# Patient Record
Sex: MALE | Race: WHITE | NOT HISPANIC OR LATINO | Employment: OTHER | URBAN - METROPOLITAN AREA
[De-identification: names, ages, dates, MRNs, and addresses within clinical notes are randomized per-mention and may not be internally consistent; named-entity substitution may affect disease eponyms.]

---

## 2017-01-04 ENCOUNTER — OFFICE VISIT (OUTPATIENT)
Dept: HEMATOLOGY/ONCOLOGY | Facility: CLINIC | Age: 82
End: 2017-01-04
Payer: MEDICARE

## 2017-01-04 ENCOUNTER — TELEPHONE (OUTPATIENT)
Dept: PHARMACY | Facility: CLINIC | Age: 82
End: 2017-01-04

## 2017-01-04 ENCOUNTER — INFUSION (OUTPATIENT)
Dept: INFUSION THERAPY | Facility: HOSPITAL | Age: 82
End: 2017-01-04
Attending: INTERNAL MEDICINE
Payer: MEDICARE

## 2017-01-04 VITALS
HEIGHT: 71 IN | SYSTOLIC BLOOD PRESSURE: 118 MMHG | BODY MASS INDEX: 23.42 KG/M2 | RESPIRATION RATE: 19 BRPM | HEART RATE: 61 BPM | WEIGHT: 167.31 LBS | DIASTOLIC BLOOD PRESSURE: 55 MMHG

## 2017-01-04 DIAGNOSIS — D63.1 ANEMIA, CHRONIC RENAL FAILURE, STAGE 3 (MODERATE): ICD-10-CM

## 2017-01-04 DIAGNOSIS — R91.8 PULMONARY NODULES: ICD-10-CM

## 2017-01-04 DIAGNOSIS — C79.51 BONE METASTASES: ICD-10-CM

## 2017-01-04 DIAGNOSIS — N18.30 CKD (CHRONIC KIDNEY DISEASE) STAGE 3, GFR 30-59 ML/MIN: ICD-10-CM

## 2017-01-04 DIAGNOSIS — J43.1 PANLOBULAR EMPHYSEMA: ICD-10-CM

## 2017-01-04 DIAGNOSIS — M62.81 MUSCLE WEAKNESS (GENERALIZED): ICD-10-CM

## 2017-01-04 DIAGNOSIS — C61 PROSTATE CANCER: Primary | ICD-10-CM

## 2017-01-04 DIAGNOSIS — C61 PROSTATE CANCER: ICD-10-CM

## 2017-01-04 DIAGNOSIS — I25.10 CORONARY ARTERY DISEASE INVOLVING NATIVE CORONARY ARTERY OF NATIVE HEART WITHOUT ANGINA PECTORIS: ICD-10-CM

## 2017-01-04 DIAGNOSIS — E86.0 DEHYDRATION: Primary | ICD-10-CM

## 2017-01-04 DIAGNOSIS — N18.30 ANEMIA, CHRONIC RENAL FAILURE, STAGE 3 (MODERATE): ICD-10-CM

## 2017-01-04 DIAGNOSIS — I73.9 PVD (PERIPHERAL VASCULAR DISEASE): ICD-10-CM

## 2017-01-04 PROCEDURE — 99215 OFFICE O/P EST HI 40 MIN: CPT | Mod: S$GLB,,, | Performed by: INTERNAL MEDICINE

## 2017-01-04 PROCEDURE — 1160F RVW MEDS BY RX/DR IN RCRD: CPT | Mod: S$GLB,,, | Performed by: INTERNAL MEDICINE

## 2017-01-04 PROCEDURE — 96372 THER/PROPH/DIAG INJ SC/IM: CPT | Mod: PN

## 2017-01-04 PROCEDURE — 99999 PR PBB SHADOW E&M-EST. PATIENT-LVL III: CPT | Mod: PBBFAC,,, | Performed by: INTERNAL MEDICINE

## 2017-01-04 PROCEDURE — 1159F MED LIST DOCD IN RCRD: CPT | Mod: S$GLB,,, | Performed by: INTERNAL MEDICINE

## 2017-01-04 PROCEDURE — 1157F ADVNC CARE PLAN IN RCRD: CPT | Mod: S$GLB,,, | Performed by: INTERNAL MEDICINE

## 2017-01-04 PROCEDURE — 1126F AMNT PAIN NOTED NONE PRSNT: CPT | Mod: S$GLB,,, | Performed by: INTERNAL MEDICINE

## 2017-01-04 PROCEDURE — 3074F SYST BP LT 130 MM HG: CPT | Mod: S$GLB,,, | Performed by: INTERNAL MEDICINE

## 2017-01-04 PROCEDURE — 3078F DIAST BP <80 MM HG: CPT | Mod: S$GLB,,, | Performed by: INTERNAL MEDICINE

## 2017-01-04 PROCEDURE — 63600175 PHARM REV CODE 636 W HCPCS: Mod: PN | Performed by: INTERNAL MEDICINE

## 2017-01-04 RX ADMIN — ERYTHROPOIETIN 40000 UNITS: 40000 INJECTION, SOLUTION INTRAVENOUS; SUBCUTANEOUS at 11:01

## 2017-01-04 RX ADMIN — DENOSUMAB 120 MG: 120 INJECTION SUBCUTANEOUS at 11:01

## 2017-01-04 NOTE — PROGRESS NOTES
HISTORY OF PRESENT ILLNESS:  81-year-old white gentleman well known to me for   prostate carcinoma involving bone.  He is also followed for indeterminate   pulmonary nodules and is due for repeat imaging in August of this year.  The   patient has complaints of increasing muscle weakness, discoordination, and loss   of stamina.  No other pertinent findings on a 14-point review of systems.    PHYSICAL EXAMINATION:  GENERAL:  Well-developed, elderly, thin-appearing, white gentleman, in no acute   distress, who has a weight of 167 pounds (decreased by 0.5 pound).  VITAL SIGNS:  Documented in EMR and reviewed.    HEENT:  Normocephalic, atraumatic.  Oral mucosa pink and moist.  Lips without   lesions.  Tongue midline.  Oropharynx clear.  Nonicteric sclerae.   NECK:  Supple, no adenopathy.  No carotid bruits, thyromegaly or thyroid nodule.                                                                        HEART:  Regular rate and rhythm without murmur, gallop or rub.                LUNGS:  Clear to auscultation bilaterally.  Normal respiratory effort.       ABDOMEN:  Soft, nontender, nondistended with positive normoactive bowel sounds,   no hepatosplenomegaly.  AXILLAE AND GROIN:  No palpable pathologic lymphadenopathy is appreciated.        SKIN:  Intact/turgor normal.  NEUROLOGIC:  Cranial nerves II-XII grossly intact.  Motor:  Good muscle bulk and   tone.  Strength/sensory 5/5 throughout.  Gait stable.  EXTREMITIES:  1+ ankle edema bilaterally.  Distal pulses intact.    LABORATORY:  White count 4, H and H has fallen from 10.9 and 32.7 to 9.5 and   28.9, platelets are 104.  Sodium 140, potassium 4.5, chloride 106, CO2 26, BUN   27, creatinine 1.4, glucose 139, calcium 9.7, mag 2.0, alkaline phosphatase 46,   total protein 5.5, albumin 3.3, total bili 0.5, AST 20, ALT 15.  .  PSA   stable at 0.07.    IMPRESSION:  1.  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  2.  Indeterminate pulmonary  nodules.  Repeat imaging due in August.  3.  Loss of muscle strength and generalized discoordination likely the result of   androgen deprivation and other therapy for the patient's prostate cancer.  4.  Anemia in the setting of stage III chronic kidney disease with poor response   to low-dose Procrit.  5.  Coronary artery disease.  6.  Chronic obstructive pulmonary disease.  7.  Peripheral vascular disease.    PLAN:  1.  Proceed with 18th cycle of therapy to consist of abiraterone 1000 mg p.o.   daily, days 1 through 28, prednisone 10 mg p.o. daily, days 1 through 28.  2.  Continue calcium supplementation with vitamin D.  3.  Repeat Xgeva 120 mg subQ.  4.  Escalate Procrit dose to 40,000 units subcutaneously per week x4 weeks.  4.  Consult home physical therapy in order to help the patient with muscle   strengthening, conditioning, stamina, and to decrease fall risk.  5.  Return to clinic in four weeks with interval CBC, CMP, LDH, magnesium and   PSA prior to appointment.      FADUMO/HN  dd: 01/04/2017 10:45:32 (CST)  td: 01/04/2017 20:00:05 (CST)  Doc ID   #[id number]  Job ID #[id number]    CC:

## 2017-01-06 ENCOUNTER — TELEPHONE (OUTPATIENT)
Dept: PHARMACY | Facility: CLINIC | Age: 82
End: 2017-01-06

## 2017-01-09 DIAGNOSIS — Z91.81 RISK FOR FALLS: Primary | ICD-10-CM

## 2017-01-13 ENCOUNTER — INFUSION (OUTPATIENT)
Dept: INFUSION THERAPY | Facility: HOSPITAL | Age: 82
End: 2017-01-13
Attending: INTERNAL MEDICINE
Payer: MEDICARE

## 2017-01-13 VITALS — SYSTOLIC BLOOD PRESSURE: 108 MMHG | TEMPERATURE: 98 F | DIASTOLIC BLOOD PRESSURE: 68 MMHG | HEART RATE: 60 BPM

## 2017-01-13 DIAGNOSIS — E86.0 DEHYDRATION: Primary | ICD-10-CM

## 2017-01-13 DIAGNOSIS — C61 PROSTATE CANCER: ICD-10-CM

## 2017-01-13 DIAGNOSIS — C79.51 BONE METASTASES: ICD-10-CM

## 2017-01-13 PROCEDURE — 63600175 PHARM REV CODE 636 W HCPCS: Mod: PN | Performed by: INTERNAL MEDICINE

## 2017-01-13 PROCEDURE — 96372 THER/PROPH/DIAG INJ SC/IM: CPT | Mod: PN

## 2017-01-13 RX ADMIN — ERYTHROPOIETIN 40000 UNITS: 40000 INJECTION, SOLUTION INTRAVENOUS; SUBCUTANEOUS at 08:01

## 2017-01-13 NOTE — MR AVS SNAPSHOT
Patient Information     Patient Name Sex Cameron Garcia Male 1935      Visit Information        Provider Department Dept Phone Center    2017 8:30 AM CHAIR 07, [facility] [phone] [facility]      Patient Instructions     None      Your Current Medications Are     abiraterone 250 mg Tab    aspirin (ECOTRIN) 81 MG EC tablet    atorvastatin (LIPITOR) 40 MG tablet    bismuth subsalicylate (PEPTO BISMOL) 262 mg/15 mL suspension    calcium carbonate-vit D3-min (CALCIUM-VITAMIN D) 600 mg calcium- 400 unit Tab    fenofibrate (TRIGLIDE) 160 MG tablet    glimepiride (AMARYL) 2 MG tablet    isosorbide mononitrate (IMDUR) 60 MG 24 hr tablet    lisinopril (PRINIVIL,ZESTRIL) 2.5 MG tablet    metoprolol (TOPROL XL) 50 MG 24 hr tablet    Multi-Vitamin tablet    nitroGLYCERIN (NITROSTAT) 0.4 MG SL tablet    ONE TOUCH ULTRA TEST Strp    oxybutynin (DITROPAN) 5 MG Tab    phenylephrine-shark liver oil-mineral oil-petrolatum (PREPARATION H) rectal ointment    PLAVIX 75 mg tablet    predniSONE (DELTASONE) 10 MG tablet    ropinirole (REQUIP) 4 MG tablet    tamsulosin (FLOMAX) 0.4 mg Cp24    terazosin (HYTRIN) 1 MG capsule      Facility-Administered Medications     epoetin andrew injection 40,000 Units      Appointments for Next Year     2017  8:30 AM INFUSION 030 MIN (30 min.) [facility] CHAIR 07, [facility] CHEMO    Arrive at check-in approximately 15 minutes before your scheduled appointment time. Bring all outside medical records and imaging, along with a list of your current medications and insurance card.    1st Floor    2017  8:30 AM INFUSION 030 MIN (30 min.) [facility] CHAIR 09, [facility] CHEMO    Arrive at check-in approximately 15 minutes before your scheduled appointment time. Bring all outside medical records and imaging, along with a list of your current medications and insurance card.    1st Floor    2017  8:30 AM  INFUSION 030 MIN (30 min.) [facility] CHAIR 05, [facility]    Arrive at check-in approximately 15 minutes before your scheduled appointment time. Bring all outside medical records and imaging, along with a list of your current medications and insurance card.    1st Floor    1/31/2017  9:00 AM NON FASTING LAB (15 min.) [facility] LAB, [facility] DRAW STATION    Arrive at check-in approximately 15 minutes before your scheduled appointment time. Bring all outside medical records and imaging, along with a list of your current medications and insurance card.    2/1/2017 10:40 AM ESTABLISHED PATIENT (20 min.) [facility] Chris Fernandez MD    Arrive at check-in approximately 15 minutes before your scheduled appointment time. Bring all outside medical records and imaging, along with a list of your current medications and insurance card.    2/1/2017 12:00 PM INFUSION 030 MIN (30 min.) [facility] CHAIR 06, [facility] CHE[facility]    Arrive at check-in approximately 15 minutes before your scheduled appointment time. Bring all outside medical records and imaging, along with a list of your current medications and insurance card.    1st Floor      Allergies     No Known Drug Allergies       Current Discharge Medication List     Cannot display discharge medications since this is not an admission.

## 2017-01-20 ENCOUNTER — INFUSION (OUTPATIENT)
Dept: INFUSION THERAPY | Facility: HOSPITAL | Age: 82
End: 2017-01-20
Attending: INTERNAL MEDICINE
Payer: MEDICARE

## 2017-01-20 VITALS
HEART RATE: 60 BPM | RESPIRATION RATE: 16 BRPM | TEMPERATURE: 98 F | SYSTOLIC BLOOD PRESSURE: 90 MMHG | DIASTOLIC BLOOD PRESSURE: 58 MMHG

## 2017-01-20 DIAGNOSIS — C61 PROSTATE CANCER: ICD-10-CM

## 2017-01-20 DIAGNOSIS — C79.51 BONE METASTASES: ICD-10-CM

## 2017-01-20 DIAGNOSIS — E86.0 DEHYDRATION: Primary | ICD-10-CM

## 2017-01-20 PROCEDURE — 63600175 PHARM REV CODE 636 W HCPCS: Mod: PN | Performed by: INTERNAL MEDICINE

## 2017-01-20 PROCEDURE — 96372 THER/PROPH/DIAG INJ SC/IM: CPT | Mod: PN

## 2017-01-20 RX ADMIN — ERYTHROPOIETIN 40000 UNITS: 40000 INJECTION, SOLUTION INTRAVENOUS; SUBCUTANEOUS at 10:01

## 2017-01-27 ENCOUNTER — INFUSION (OUTPATIENT)
Dept: INFUSION THERAPY | Facility: HOSPITAL | Age: 82
End: 2017-01-27
Attending: INTERNAL MEDICINE
Payer: MEDICARE

## 2017-01-27 VITALS
HEIGHT: 71 IN | BODY MASS INDEX: 22.98 KG/M2 | SYSTOLIC BLOOD PRESSURE: 130 MMHG | HEART RATE: 69 BPM | TEMPERATURE: 98 F | RESPIRATION RATE: 16 BRPM | DIASTOLIC BLOOD PRESSURE: 56 MMHG | WEIGHT: 164.13 LBS

## 2017-01-27 DIAGNOSIS — E86.0 DEHYDRATION: Primary | ICD-10-CM

## 2017-01-27 DIAGNOSIS — C61 PROSTATE CANCER: ICD-10-CM

## 2017-01-27 DIAGNOSIS — C79.51 BONE METASTASES: ICD-10-CM

## 2017-01-27 PROCEDURE — 63600175 PHARM REV CODE 636 W HCPCS: Mod: PN | Performed by: INTERNAL MEDICINE

## 2017-01-27 PROCEDURE — 96372 THER/PROPH/DIAG INJ SC/IM: CPT | Mod: PN

## 2017-01-27 RX ADMIN — ERYTHROPOIETIN 40000 UNITS: 40000 INJECTION, SOLUTION INTRAVENOUS; SUBCUTANEOUS at 01:01

## 2017-01-27 NOTE — PLAN OF CARE
Problem: Activity Intolerance (Adult)  Goal: Effective Energy Conservation Techniques  Patient will demonstrate the desired outcomes by discharge/transition of care.   Outcome: Ongoing (interventions implemented as appropriate)  TOLERATED TREATMENT WITHOUT DIFFICULTY.

## 2017-01-30 ENCOUNTER — TELEPHONE (OUTPATIENT)
Dept: PHARMACY | Facility: CLINIC | Age: 82
End: 2017-01-30

## 2017-01-31 ENCOUNTER — LAB VISIT (OUTPATIENT)
Dept: LAB | Facility: HOSPITAL | Age: 82
End: 2017-01-31
Attending: INTERNAL MEDICINE
Payer: MEDICARE

## 2017-01-31 DIAGNOSIS — R91.8 PULMONARY NODULES: ICD-10-CM

## 2017-01-31 DIAGNOSIS — D63.1 ANEMIA, CHRONIC RENAL FAILURE, STAGE 3 (MODERATE): ICD-10-CM

## 2017-01-31 DIAGNOSIS — C79.51 BONE METASTASES: ICD-10-CM

## 2017-01-31 DIAGNOSIS — N18.30 ANEMIA, CHRONIC RENAL FAILURE, STAGE 3 (MODERATE): ICD-10-CM

## 2017-01-31 DIAGNOSIS — C61 PROSTATE CANCER: ICD-10-CM

## 2017-01-31 DIAGNOSIS — N18.30 CKD (CHRONIC KIDNEY DISEASE) STAGE 3, GFR 30-59 ML/MIN: ICD-10-CM

## 2017-01-31 LAB
ALBUMIN SERPL BCP-MCNC: 3.1 G/DL
ALP SERPL-CCNC: 58 U/L
ALT SERPL W/O P-5'-P-CCNC: 21 U/L
ANION GAP SERPL CALC-SCNC: 7 MMOL/L
AST SERPL-CCNC: 25 U/L
BASOPHILS # BLD AUTO: 0.03 K/UL
BASOPHILS NFR BLD: 0.6 %
BILIRUB SERPL-MCNC: 0.7 MG/DL
BUN SERPL-MCNC: 25 MG/DL
CALCIUM SERPL-MCNC: 8.5 MG/DL
CHLORIDE SERPL-SCNC: 103 MMOL/L
CO2 SERPL-SCNC: 25 MMOL/L
COMPLEXED PSA SERPL-MCNC: <0.07 NG/ML
CREAT SERPL-MCNC: 1.15 MG/DL
DIFFERENTIAL METHOD: ABNORMAL
EOSINOPHIL # BLD AUTO: 0 K/UL
EOSINOPHIL NFR BLD: 0.6 %
ERYTHROCYTE [DISTWIDTH] IN BLOOD BY AUTOMATED COUNT: 15.8 %
EST. GFR  (AFRICAN AMERICAN): >60 ML/MIN/1.73 M^2
EST. GFR  (NON AFRICAN AMERICAN): 59 ML/MIN/1.73 M^2
GLUCOSE SERPL-MCNC: 265 MG/DL
HCT VFR BLD AUTO: 32.1 %
HGB BLD-MCNC: 10.3 G/DL
LDH SERPL L TO P-CCNC: 443 U/L
LYMPHOCYTES # BLD AUTO: 0.6 K/UL
LYMPHOCYTES NFR BLD: 10.9 %
MAGNESIUM SERPL-MCNC: 2 MG/DL
MCH RBC QN AUTO: 30.1 PG
MCHC RBC AUTO-ENTMCNC: 32.1 %
MCV RBC AUTO: 94 FL
MONOCYTES # BLD AUTO: 0.3 K/UL
MONOCYTES NFR BLD: 6.6 %
NEUTROPHILS # BLD AUTO: 4.2 K/UL
NEUTROPHILS NFR BLD: 81.3 %
NRBC BLD-RTO: 0 /100 WBC
PLATELET # BLD AUTO: 131 K/UL
PMV BLD AUTO: 11.5 FL
POTASSIUM SERPL-SCNC: 4.5 MMOL/L
PROT SERPL-MCNC: 5.4 G/DL
RBC # BLD AUTO: 3.42 M/UL
SODIUM SERPL-SCNC: 135 MMOL/L
WBC # BLD AUTO: 5.13 K/UL

## 2017-01-31 PROCEDURE — 84153 ASSAY OF PSA TOTAL: CPT | Mod: PN

## 2017-01-31 PROCEDURE — 80053 COMPREHEN METABOLIC PANEL: CPT | Mod: PN

## 2017-01-31 PROCEDURE — 83615 LACTATE (LD) (LDH) ENZYME: CPT | Mod: PN

## 2017-01-31 PROCEDURE — 83735 ASSAY OF MAGNESIUM: CPT | Mod: PN

## 2017-01-31 PROCEDURE — 36415 COLL VENOUS BLD VENIPUNCTURE: CPT | Mod: PN

## 2017-01-31 PROCEDURE — 83615 LACTATE (LD) (LDH) ENZYME: CPT

## 2017-01-31 PROCEDURE — 80053 COMPREHEN METABOLIC PANEL: CPT

## 2017-01-31 PROCEDURE — 85025 COMPLETE CBC W/AUTO DIFF WBC: CPT | Mod: PN

## 2017-01-31 PROCEDURE — 83735 ASSAY OF MAGNESIUM: CPT

## 2017-01-31 PROCEDURE — 84153 ASSAY OF PSA TOTAL: CPT

## 2017-01-31 PROCEDURE — 85025 COMPLETE CBC W/AUTO DIFF WBC: CPT

## 2017-01-31 RX ORDER — ISOSORBIDE MONONITRATE 60 MG/1
TABLET, EXTENDED RELEASE ORAL
Qty: 90 TABLET | Refills: 5 | Status: SHIPPED | OUTPATIENT
Start: 2017-01-31 | End: 2017-08-21 | Stop reason: CLARIF

## 2017-02-01 ENCOUNTER — OFFICE VISIT (OUTPATIENT)
Dept: HEMATOLOGY/ONCOLOGY | Facility: CLINIC | Age: 82
End: 2017-02-01
Payer: MEDICARE

## 2017-02-01 VITALS
TEMPERATURE: 98 F | BODY MASS INDEX: 23.27 KG/M2 | HEART RATE: 67 BPM | RESPIRATION RATE: 18 BRPM | DIASTOLIC BLOOD PRESSURE: 65 MMHG | WEIGHT: 166.25 LBS | HEIGHT: 71 IN | SYSTOLIC BLOOD PRESSURE: 144 MMHG

## 2017-02-01 DIAGNOSIS — C61 PROSTATE CANCER: Primary | ICD-10-CM

## 2017-02-01 DIAGNOSIS — R91.8 PULMONARY NODULES: ICD-10-CM

## 2017-02-01 DIAGNOSIS — N18.30 ANEMIA, CHRONIC RENAL FAILURE, STAGE 3 (MODERATE): ICD-10-CM

## 2017-02-01 DIAGNOSIS — D63.1 ANEMIA, CHRONIC RENAL FAILURE, STAGE 3 (MODERATE): ICD-10-CM

## 2017-02-01 DIAGNOSIS — C79.51 BONE METASTASES: ICD-10-CM

## 2017-02-01 DIAGNOSIS — N18.30 CKD (CHRONIC KIDNEY DISEASE) STAGE 3, GFR 30-59 ML/MIN: ICD-10-CM

## 2017-02-01 PROCEDURE — 1126F AMNT PAIN NOTED NONE PRSNT: CPT | Mod: S$GLB,,, | Performed by: INTERNAL MEDICINE

## 2017-02-01 PROCEDURE — 1157F ADVNC CARE PLAN IN RCRD: CPT | Mod: S$GLB,,, | Performed by: INTERNAL MEDICINE

## 2017-02-01 PROCEDURE — 1159F MED LIST DOCD IN RCRD: CPT | Mod: S$GLB,,, | Performed by: INTERNAL MEDICINE

## 2017-02-01 PROCEDURE — 99214 OFFICE O/P EST MOD 30 MIN: CPT | Mod: S$GLB,,, | Performed by: INTERNAL MEDICINE

## 2017-02-01 PROCEDURE — 3078F DIAST BP <80 MM HG: CPT | Mod: S$GLB,,, | Performed by: INTERNAL MEDICINE

## 2017-02-01 PROCEDURE — 99999 PR PBB SHADOW E&M-EST. PATIENT-LVL III: CPT | Mod: PBBFAC,,, | Performed by: INTERNAL MEDICINE

## 2017-02-01 PROCEDURE — 1160F RVW MEDS BY RX/DR IN RCRD: CPT | Mod: S$GLB,,, | Performed by: INTERNAL MEDICINE

## 2017-02-01 PROCEDURE — 3077F SYST BP >= 140 MM HG: CPT | Mod: S$GLB,,, | Performed by: INTERNAL MEDICINE

## 2017-02-01 RX ORDER — OMEPRAZOLE 20 MG/1
20 CAPSULE, DELAYED RELEASE ORAL DAILY
Refills: 3 | COMMUNITY
Start: 2017-01-21 | End: 2017-07-25

## 2017-02-01 NOTE — PROGRESS NOTES
HISTORY OF PRESENT ILLNESS:  An 81-year-old gentleman well known to me for   prostate carcinoma involving bone, as well as indeterminate pulmonary nodules   for which repeat imaging is due in August of this year.  The patient returns to   clinic for evaluation prior to next cycle of abiraterone/prednisone.  He   continues to have some difficulty with generalized muscle weakness.  He is   planning a home exercise program as opposed to formal physical therapy referral.    No other pertinent findings on a 14-point review of systems.    PHYSICAL EXAMINATION:  GENERAL:  Well-developed, elderly, thin-appearing, white gentleman in no acute   distress, with a weight of 166 pounds.  VITAL SIGNS:  Documented in EMR and reviewed.  HEENT:  Normocephalic, atraumatic.  Oral mucosa pink and moist.  Lips without   lesions.  Tongue midline.  Oropharynx clear.  Nonicteric sclerae.   NECK:  Supple, no adenopathy.  No carotid bruits, thyromegaly or thyroid nodule.                                                                        HEART:  Regular rate and rhythm without murmur, gallop or rub.                LUNGS:  Clear to auscultation bilaterally.  Normal respiratory effort.       ABDOMEN:  Soft, nontender, nondistended with positive normoactive bowel sounds,   no hepatosplenomegaly.    EXTREMITIES:  No cyanosis, clubbing or edema.  Distal pulses are intact.                                              AXILLAE AND GROIN:  No palpable pathologic lymphadenopathy is appreciated.        SKIN:  Intact/turgor normal.  NEUROLOGIC:  Cranial nerves II-XII grossly intact.  Motor:  Good muscle bulk and   tone.  Strength/sensory 5/5 throughout.  Gait stable.    LABORATORY:  White count 5.1, H and H 10.3 and 32.1, platelet count of 131.    Sodium is 135, potassium 4.5, chloride 103, CO2 25, BUN 25, creatinine 1.2,   glucose 265, calcium 8.5, mag 2.0.  Liver function tests are within normal   limits.  .  GFR 59.  PSA stable at less than  0.07.    IMPRESSION:  1.  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  2.  Indeterminate pulmonary nodules, due for repeat imaging in August.  3.  Generalized loss of strength, muscle coordination - ongoing home exercise   program.  4.  Anemia in the setting of stage III chronic kidney disease with better   response after elevation in Procrit dose.  5.  Coronary artery disease.  6.  Chronic obstructive pulmonary disease.  7.  Peripheral vascular disease.    PLAN:  1.  Proceed with 19th cycle of therapy to consist of abiraterone 1000 mg p.o.   daily, days 1 through 28, prednisone 10 mg p.o. daily, days 1 through 28.  2.  Continue calcium supplementation with D.  3.  Decrease Xgeva administration to 120 mg subQ quarterly.  4.  Hold Procrit as the patient's hemoglobin is greater than 10.  5.  Return to clinic in four weeks with interval CBC, CMP, LDH, mag and PSA.      FADUMO/HN  dd: 02/01/2017 11:31:21 (CST)  td: 02/01/2017 21:23:58 (CST)  Doc ID   #[id number]  Job ID #[id number]    CC:

## 2017-02-01 NOTE — MR AVS SNAPSHOT
[place] [address]  [facility] [address]  Phone: [phone]  Fax: [phone]                  Cameron Bridges   2017 10:40 AM   Office Visit    Description:  Male : 1935   Provider:  Chris Fernandez MD   Department:  [facility]           Diagnoses this Visit        Comments    Prostate cancer    -  Primary     Bone metastases         Pulmonary nodules         CKD (chronic kidney disease) stage 3, GFR 30-59 ml/min         Anemia, chronic renal failure, stage 3 (moderate)                To Do List           Future Appointments        Provider Department Dept Phone    2017 10:15 AM LAB, [place] [facility] [phone]    3/1/2017 9:20 AM Chris Fernandez MD [facility] [phone]    3/1/2017 10:30 AM CHAIR 27, [facility] [phone]    3/29/2017 11:30 AM CHAIR 08, [facility] [phone]      Goals (5 Years of Data)     None      [facility] On Call     [facility] -  Assistance  Registered nurses in the [facility] provide clinical advisement, health education, appointment booking, and other advisory services.  Call for this free service at [phone].             Medications           Message regarding Medications     Verify the changes and/or additions to your medication regime listed below are the same as discussed with your clinician today.  If any of these changes or additions are incorrect, please notify your healthcare provider.             Verify that the below list of medications is an accurate representation of the medications you are currently taking.  If none reported, the list may be blank. If incorrect, please contact your healthcare provider. Carry this list with you in case of emergency.           Current Medications     abiraterone 250 mg Tab Take 4 tablets (1,000 mg total) by mouth once  "daily.    aspirin (ECOTRIN) 81 MG EC tablet Take 1 tablet by mouth.    atorvastatin (LIPITOR) 40 MG tablet Take 1 tablet by mouth every evening.     bismuth subsalicylate (PEPTO BISMOL) 262 mg/15 mL suspension Take 15 mLs by mouth every 6 (six) hours as needed for Indigestion.    calcium carbonate-vit D3-min (CALCIUM-VITAMIN D) 600 mg calcium- 400 unit Tab Take 1 tablet by mouth once daily.     fenofibrate (TRIGLIDE) 160 MG tablet Take 160 mg by mouth once daily.      glimepiride (AMARYL) 2 MG tablet Take 2 mg by mouth 2 (two) times daily.     isosorbide mononitrate (IMDUR) 60 MG 24 hr tablet TAKE 1 TABLET BY MOUTH DAILY    lisinopril (PRINIVIL,ZESTRIL) 2.5 MG tablet Take 2.5 mg by mouth once daily.      metoprolol (TOPROL XL) 50 MG 24 hr tablet Take 50 mg by mouth once daily.     Multi-Vitamin tablet Take 1 tablet by mouth once daily.     nitroGLYCERIN (NITROSTAT) 0.4 MG SL tablet Place 1 tablet (0.4 mg total) under the tongue every 5 (five) minutes as needed for Chest pain.    omeprazole (PRILOSEC) 20 MG capsule Take 20 mg by mouth once daily.    ONE TOUCH ULTRA TEST Strp     oxybutynin (DITROPAN) 5 MG Tab TAKE 1 TABLET BY MOUTH EVERY EVENING    phenylephrine-shark liver oil-mineral oil-petrolatum (PREPARATION H) rectal ointment Place rectally 2 (two) times daily as needed.      PLAVIX 75 mg tablet Take 75 mg by mouth once daily.     predniSONE (DELTASONE) 10 MG tablet Take 1 tablet (10 mg total) by mouth once daily.    ropinirole (REQUIP) 4 MG tablet Take 8 mg by mouth nightly.    tamsulosin (FLOMAX) 0.4 mg Cp24 Take 1 capsule by mouth Daily.    terazosin (HYTRIN) 1 MG capsule 1 capsule Twice daily.           Clinical Reference Information           Vital Signs - Last Recorded  Most recent update: 2/1/2017 10:46 AM by Roberta Arana LPN    BP Pulse Temp Resp Ht Wt    (!) 144/65 67 98.4 °F (36.9 °C) 18 5' 11" (1.803 m) 75.4 kg (166 lb 3.6 oz)    BMI                23.18 kg/m2          Blood Pressure       "    Most Recent Value    BP  (!)  144/65      Allergies as of 2/1/2017     No Known Drug Allergies      Immunizations Administered on Date of Encounter - 2/1/2017     None      Orders Placed During Today's Visit     Future Labs/Procedures Expected by Expires    CBC w/ DIFF  2/1/2017 4/2/2018    CMP  2/1/2017 4/2/2018    LDH  2/1/2017 4/2/2018    MG  2/1/2017 4/2/2018    PSA  2/1/2017 4/2/2018      Smoking Cessation     If you would like to quit smoking:   You may be eligible for free services if you are a [place] resident and started smoking cigarettes before September 1, 1988.  Call the [employer]oking [employer]essation [employer] (S[employer]) toll free at ([phone] 757-1240 or [phone] [phone].   Call 8-348-QUIT-NOW if you do not meet the above criteria.

## 2017-02-02 NOTE — PROGRESS NOTES
Patient, Cameron Bridges (MRN #[id number]), presented with a recent Platelet count less than 150 K/uL consistent with the definition of thrombocytopenia (ICD10 - D69.6).    Platelets   Date Value Ref Range Status   01/31/2017 131 (L) 150 - 350 K/uL Final     The patient's thrombocytopenia was monitored, evaluated, addressed and/or treated. This addendum to the medical record is made on 02/01/2017.

## 2017-02-08 ENCOUNTER — TELEPHONE (OUTPATIENT)
Dept: PHARMACY | Facility: CLINIC | Age: 82
End: 2017-02-08

## 2017-02-09 ENCOUNTER — TELEPHONE (OUTPATIENT)
Dept: PHARMACY | Facility: CLINIC | Age: 82
End: 2017-02-09

## 2017-02-09 DIAGNOSIS — C61 PROSTATE CANCER: ICD-10-CM

## 2017-02-10 RX ORDER — PREDNISONE 10 MG/1
TABLET ORAL
Qty: 30 TABLET | Refills: 0 | Status: SHIPPED | OUTPATIENT
Start: 2017-02-10 | End: 2017-04-05 | Stop reason: SDUPTHER

## 2017-02-13 DIAGNOSIS — I73.9 PERIPHERAL VASCULAR DISEASE, UNSPECIFIED: Primary | ICD-10-CM

## 2017-02-13 DIAGNOSIS — Z79.01 LONG TERM (CURRENT) USE OF ANTICOAGULANTS: ICD-10-CM

## 2017-02-13 RX ORDER — SODIUM CHLORIDE 9 MG/ML
INJECTION, SOLUTION INTRAVENOUS CONTINUOUS
Status: CANCELLED | OUTPATIENT
Start: 2017-02-13

## 2017-02-17 PROBLEM — I73.9 PERIPHERAL VASCULAR DISEASE, UNSPECIFIED: Status: ACTIVE | Noted: 2017-02-17

## 2017-02-21 DIAGNOSIS — C61 PROSTATE CANCER: ICD-10-CM

## 2017-02-21 DIAGNOSIS — C79.51 BONE METASTASES: ICD-10-CM

## 2017-02-21 DIAGNOSIS — E86.0 DEHYDRATION: ICD-10-CM

## 2017-02-21 DIAGNOSIS — R19.7 DIARRHEA, UNSPECIFIED TYPE: ICD-10-CM

## 2017-02-21 RX ORDER — ABIRATERONE ACETATE 250 MG/1
1000 TABLET ORAL DAILY
Qty: 112 TABLET | Refills: 2 | Status: SHIPPED | OUTPATIENT
Start: 2017-02-21 | End: 2017-04-04 | Stop reason: SDUPTHER

## 2017-02-23 ENCOUNTER — OFFICE VISIT (OUTPATIENT)
Dept: VASCULAR SURGERY | Facility: CLINIC | Age: 82
End: 2017-02-23
Payer: MEDICARE

## 2017-02-23 VITALS
BODY MASS INDEX: 23.14 KG/M2 | WEIGHT: 165.25 LBS | SYSTOLIC BLOOD PRESSURE: 114 MMHG | DIASTOLIC BLOOD PRESSURE: 79 MMHG | HEIGHT: 71 IN | HEART RATE: 76 BPM

## 2017-02-23 DIAGNOSIS — I73.9 PAD (PERIPHERAL ARTERY DISEASE): Primary | ICD-10-CM

## 2017-02-23 PROCEDURE — 99212 OFFICE O/P EST SF 10 MIN: CPT | Mod: S$GLB,,, | Performed by: THORACIC SURGERY (CARDIOTHORACIC VASCULAR SURGERY)

## 2017-02-23 PROCEDURE — 1157F ADVNC CARE PLAN IN RCRD: CPT | Mod: S$GLB,,, | Performed by: THORACIC SURGERY (CARDIOTHORACIC VASCULAR SURGERY)

## 2017-02-23 PROCEDURE — 3078F DIAST BP <80 MM HG: CPT | Mod: S$GLB,,, | Performed by: THORACIC SURGERY (CARDIOTHORACIC VASCULAR SURGERY)

## 2017-02-23 PROCEDURE — 99999 PR PBB SHADOW E&M-EST. PATIENT-LVL III: CPT | Mod: PBBFAC,,, | Performed by: THORACIC SURGERY (CARDIOTHORACIC VASCULAR SURGERY)

## 2017-02-23 PROCEDURE — 1160F RVW MEDS BY RX/DR IN RCRD: CPT | Mod: S$GLB,,, | Performed by: THORACIC SURGERY (CARDIOTHORACIC VASCULAR SURGERY)

## 2017-02-23 PROCEDURE — 1159F MED LIST DOCD IN RCRD: CPT | Mod: S$GLB,,, | Performed by: THORACIC SURGERY (CARDIOTHORACIC VASCULAR SURGERY)

## 2017-02-23 PROCEDURE — 1125F AMNT PAIN NOTED PAIN PRSNT: CPT | Mod: S$GLB,,, | Performed by: THORACIC SURGERY (CARDIOTHORACIC VASCULAR SURGERY)

## 2017-02-23 PROCEDURE — 3074F SYST BP LT 130 MM HG: CPT | Mod: S$GLB,,, | Performed by: THORACIC SURGERY (CARDIOTHORACIC VASCULAR SURGERY)

## 2017-02-23 RX ORDER — CILOSTAZOL 100 MG/1
100 TABLET ORAL 2 TIMES DAILY
Qty: 60 TABLET | Refills: 0
Start: 2017-02-23 | End: 2017-03-28

## 2017-02-24 NOTE — PROGRESS NOTES
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  This could not be fixed or treated   percutaneously.  He returns to the office today in followup.  He has   claudication on both lower extremities.  This seems to have worsened in the left   lower extremity since his angiogram.  His medicines are noted.  They are part   of the recent EPIC record.  His problem list is reviewed.  He does have a groin   hematoma, which is small on the left where access was obtained associated with   bruising.  Capillary refill is satisfactory.  Doppler signals are monophasic   bilaterally.  I explained to him at this point, more than likely he is going to   need at least a right femoropopliteal bypass graft if he continues to have   significant claudication and rest pain.  The other leg will have to be studied   at some point, perhaps but I think it may be worthwhile.  We have given him a   trial of Pletal to see if this can enhance his walking distances and improve his   claudication.  Based on results of the medical management, we will make further   recommendations.      RUTHY  dd: 02/23/2017 09:59:51 (CST)  td: 02/23/2017 23:34:42 (CST)  Doc ID   #[id number]  Job ID #[id number]    CC:

## 2017-02-27 ENCOUNTER — LAB VISIT (OUTPATIENT)
Dept: LAB | Facility: HOSPITAL | Age: 82
End: 2017-02-27
Attending: INTERNAL MEDICINE
Payer: MEDICARE

## 2017-02-27 DIAGNOSIS — N18.30 CKD (CHRONIC KIDNEY DISEASE) STAGE 3, GFR 30-59 ML/MIN: ICD-10-CM

## 2017-02-27 DIAGNOSIS — R91.8 PULMONARY NODULES: ICD-10-CM

## 2017-02-27 DIAGNOSIS — D63.1 ANEMIA, CHRONIC RENAL FAILURE, STAGE 3 (MODERATE): ICD-10-CM

## 2017-02-27 DIAGNOSIS — C61 PROSTATE CANCER: ICD-10-CM

## 2017-02-27 DIAGNOSIS — N18.30 ANEMIA, CHRONIC RENAL FAILURE, STAGE 3 (MODERATE): ICD-10-CM

## 2017-02-27 DIAGNOSIS — C79.51 BONE METASTASES: ICD-10-CM

## 2017-02-27 LAB
ALBUMIN SERPL BCP-MCNC: 3.2 G/DL
ALP SERPL-CCNC: 61 U/L
ALT SERPL W/O P-5'-P-CCNC: 22 U/L
ANION GAP SERPL CALC-SCNC: 7 MMOL/L
AST SERPL-CCNC: 20 U/L
BASOPHILS # BLD AUTO: 0.02 K/UL
BASOPHILS NFR BLD: 0.4 %
BILIRUB SERPL-MCNC: 0.5 MG/DL
BUN SERPL-MCNC: 29 MG/DL
CALCIUM SERPL-MCNC: 9 MG/DL
CHLORIDE SERPL-SCNC: 105 MMOL/L
CO2 SERPL-SCNC: 26 MMOL/L
COMPLEXED PSA SERPL-MCNC: 0.08 NG/ML
CREAT SERPL-MCNC: 1.29 MG/DL
DIFFERENTIAL METHOD: ABNORMAL
EOSINOPHIL # BLD AUTO: 0 K/UL
EOSINOPHIL NFR BLD: 0.9 %
ERYTHROCYTE [DISTWIDTH] IN BLOOD BY AUTOMATED COUNT: 15.9 %
EST. GFR  (AFRICAN AMERICAN): 60 ML/MIN/1.73 M^2
EST. GFR  (NON AFRICAN AMERICAN): 52 ML/MIN/1.73 M^2
GLUCOSE SERPL-MCNC: 226 MG/DL
HCT VFR BLD AUTO: 30.2 %
HGB BLD-MCNC: 9.7 G/DL
LDH SERPL L TO P-CCNC: 420 U/L
LYMPHOCYTES # BLD AUTO: 0.8 K/UL
LYMPHOCYTES NFR BLD: 17 %
MAGNESIUM SERPL-MCNC: 1.9 MG/DL
MCH RBC QN AUTO: 29.8 PG
MCHC RBC AUTO-ENTMCNC: 32.1 %
MCV RBC AUTO: 93 FL
MONOCYTES # BLD AUTO: 0.3 K/UL
MONOCYTES NFR BLD: 7.3 %
NEUTROPHILS # BLD AUTO: 3.4 K/UL
NEUTROPHILS NFR BLD: 74.4 %
NRBC BLD-RTO: 0 /100 WBC
PLATELET # BLD AUTO: 118 K/UL
PMV BLD AUTO: 11.5 FL
POTASSIUM SERPL-SCNC: 4.4 MMOL/L
PROT SERPL-MCNC: 5.5 G/DL
RBC # BLD AUTO: 3.26 M/UL
SODIUM SERPL-SCNC: 138 MMOL/L
WBC # BLD AUTO: 4.54 K/UL

## 2017-02-27 PROCEDURE — 83735 ASSAY OF MAGNESIUM: CPT | Mod: PN

## 2017-02-27 PROCEDURE — 36415 COLL VENOUS BLD VENIPUNCTURE: CPT | Mod: PN

## 2017-02-27 PROCEDURE — 80053 COMPREHEN METABOLIC PANEL: CPT

## 2017-02-27 PROCEDURE — 84153 ASSAY OF PSA TOTAL: CPT | Mod: PN

## 2017-02-27 PROCEDURE — 80053 COMPREHEN METABOLIC PANEL: CPT | Mod: PN

## 2017-02-27 PROCEDURE — 85025 COMPLETE CBC W/AUTO DIFF WBC: CPT | Mod: PN

## 2017-02-27 PROCEDURE — 83615 LACTATE (LD) (LDH) ENZYME: CPT | Mod: PN

## 2017-02-27 PROCEDURE — 85025 COMPLETE CBC W/AUTO DIFF WBC: CPT

## 2017-02-27 PROCEDURE — 84153 ASSAY OF PSA TOTAL: CPT

## 2017-02-27 PROCEDURE — 83735 ASSAY OF MAGNESIUM: CPT

## 2017-02-27 PROCEDURE — 83615 LACTATE (LD) (LDH) ENZYME: CPT

## 2017-03-01 ENCOUNTER — OFFICE VISIT (OUTPATIENT)
Dept: HEMATOLOGY/ONCOLOGY | Facility: CLINIC | Age: 82
End: 2017-03-01
Payer: MEDICARE

## 2017-03-01 ENCOUNTER — TELEPHONE (OUTPATIENT)
Dept: HEMATOLOGY/ONCOLOGY | Facility: CLINIC | Age: 82
End: 2017-03-01

## 2017-03-01 VITALS
DIASTOLIC BLOOD PRESSURE: 65 MMHG | BODY MASS INDEX: 23.36 KG/M2 | HEIGHT: 71 IN | SYSTOLIC BLOOD PRESSURE: 140 MMHG | WEIGHT: 166.88 LBS | TEMPERATURE: 98 F | HEART RATE: 77 BPM | RESPIRATION RATE: 18 BRPM

## 2017-03-01 DIAGNOSIS — C61 PROSTATE CANCER: ICD-10-CM

## 2017-03-01 DIAGNOSIS — C79.51 BONE METASTASES: ICD-10-CM

## 2017-03-01 DIAGNOSIS — C61 PROSTATE CANCER: Primary | ICD-10-CM

## 2017-03-01 DIAGNOSIS — C61 MALIGNANT NEOPLASM OF PROSTATE: Primary | ICD-10-CM

## 2017-03-01 PROCEDURE — 3077F SYST BP >= 140 MM HG: CPT | Mod: S$GLB,,, | Performed by: INTERNAL MEDICINE

## 2017-03-01 PROCEDURE — 99999 PR PBB SHADOW E&M-EST. PATIENT-LVL III: CPT | Mod: PBBFAC,,, | Performed by: INTERNAL MEDICINE

## 2017-03-01 PROCEDURE — 3078F DIAST BP <80 MM HG: CPT | Mod: S$GLB,,, | Performed by: INTERNAL MEDICINE

## 2017-03-01 PROCEDURE — 1125F AMNT PAIN NOTED PAIN PRSNT: CPT | Mod: S$GLB,,, | Performed by: INTERNAL MEDICINE

## 2017-03-01 PROCEDURE — 1157F ADVNC CARE PLAN IN RCRD: CPT | Mod: S$GLB,,, | Performed by: INTERNAL MEDICINE

## 2017-03-01 PROCEDURE — 99214 OFFICE O/P EST MOD 30 MIN: CPT | Mod: S$GLB,,, | Performed by: INTERNAL MEDICINE

## 2017-03-01 PROCEDURE — 1160F RVW MEDS BY RX/DR IN RCRD: CPT | Mod: S$GLB,,, | Performed by: INTERNAL MEDICINE

## 2017-03-01 PROCEDURE — 1159F MED LIST DOCD IN RCRD: CPT | Mod: S$GLB,,, | Performed by: INTERNAL MEDICINE

## 2017-03-01 NOTE — PROGRESS NOTES
The patient is an 81-year-old white gentleman well known to me for prostate   carcinoma involving bone, as well as indeterminate pulmonary nodules, which are   due for repeat imaging in August of this year.  The patient remains on   abiraterone and prednisone.  He continues to have difficulty with peripheral   vascular disease and claudication involving his right lower extremity.  No other   pertinent findings on a 14-point review of systems.    PHYSICAL EXAMINATION:  GENERAL:  Well-developed, elderly, thin-appearing, white gentleman in no acute   distress.  VITAL SIGNS:  Weight 166-1/2 pounds (increased by 1/2 pound).  HEENT:  Normocephalic, atraumatic.  Oral mucosa pink and moist.  Lips without   lesions.  Tongue midline.  Oropharynx clear.  Nonicteric sclerae.  NECK:  Supple, no adenopathy.  No carotid bruits, thyromegaly or thyroid nodule.  HEART:  Regular rate and rhythm without murmur, gallop or rub.  LUNGS:  Clear to auscultation bilaterally.  Normal respiratory effort.  ABDOMEN:  Soft, nontender, nondistended with positive normoactive bowel sounds,   no hepatosplenomegaly.  EXTREMITIES:  No cyanosis, clubbing or edema.  AXILLAE AND GROIN:  No palpable pathologic lymphadenopathy is appreciated.  SKIN:  Intact/turgor normal.  NEUROLOGIC:  Cranial nerves II-XII grossly intact.  Motor:  Good muscle bulk and   tone.  Strength/sensory 5/5 throughout.  Gait stable.    LABORATORY:  White count 4.5, H and H 9.7 and 30.2, platelet count 118.  Sodium   138, potassium 4.4, chloride 105, CO2 26, BUN 29, creatinine 1.3, glucose 226,   calcium 9, mag 1.9.  Liver function tests are within normal limits.  .    PSA 0.08.  GFR 52.    IMPRESSION:  1.  Metastatic prostate carcinoma involving bone -- clinically stable.  2.  Indeterminate pulmonary nodules, due for repeat imaging in August.  3.  Anemia in the setting of stage III CKD.  4.  Coronary artery disease.  5.  COPD.  6.  Symptomatic peripheral vascular  disease.    PLAN:  1.  Proceed with 20th cycle of therapy to consist of abiraterone 1000 mg p.o.   daily and prednisone 10 mg p.o. daily on days 1 through 28.  2.  Continue calcium supplementation with vitamin D.  3.  We will hold Xgeva until next cycle.  4.  Procrit 40,000 units subcutaneously per week x4.  5.  Return to clinic in four weeks with interval CBC, CMP, LDH, mag, and PSA   prior to appointment.      FADUMO/MICKY  dd: 03/01/2017 10:13:04 (CST)  td: 03/01/2017 19:16:39 (CST)  Doc ID   #[id number]  Job ID #[id number]    CC:

## 2017-03-01 NOTE — MR AVS SNAPSHOT
[place] [address]  [facility] [address]  Phone: [phone]  Fax: [phone]                  Cameron Bridges   3/1/2017 9:20 AM   Office Visit    Description:  Male : 1935   Provider:  Chris Fernandez MD   Department:  [facility]           Diagnoses this Visit        Comments    Prostate cancer    -  Primary     Bone metastases                To Do List           Future Appointments        Provider Department Dept Phone    3/2/2017 2:00 PM CHAIR 22, [facility] [phone]    3/16/2017 9:00 AM Enrike Hartmann MD [facility] Cardio Vascular [phone]    3/23/2017 11:15 AM LAB, [place] [facility] [phone]    3/29/2017 2:00 PM Chris Fernandez MD [facility] Hematology [phone]    3/29/2017 3:00 PM CHAIR 24, [facility] [phone]      Goals (5 Years of Data)     None      [facility] On Call     [facility] - 24/7 Assistance  Registered nurses in the [facility] provide clinical advisement, health education, appointment booking, and other advisory services.  Call for this free service at [phone].             Medications           Message regarding Medications     Verify the changes and/or additions to your medication regime listed below are the same as discussed with your clinician today.  If any of these changes or additions are incorrect, please notify your healthcare provider.             Verify that the below list of medications is an accurate representation of the medications you are currently taking.  If none reported, the list may be blank. If incorrect, please contact your healthcare provider. Carry this list with you in case of emergency.           Current Medications     abiraterone 250 mg Tab Take 4 tablets (1,000 mg total) by mouth once daily.    aspirin (ECOTRIN) 81 MG EC tablet Take 1 tablet by mouth  Every 3 (three) days.     atorvastatin (LIPITOR) 40 MG tablet Take 1 tablet by mouth every evening.     bismuth subsalicylate (PEPTO BISMOL) 262 mg/15 mL suspension Take 15 mLs by mouth every 6 (six) hours as needed for Indigestion.    calcium carbonate-vit D3-min (CALCIUM-VITAMIN D) 600 mg calcium- 400 unit Tab Take 1 tablet by mouth once daily.     cilostazol (PLETAL) 100 MG Tab Take 1 tablet (100 mg total) by mouth 2 (two) times daily.    fenofibrate (TRIGLIDE) 160 MG tablet Take 160 mg by mouth once daily.      glimepiride (AMARYL) 2 MG tablet Take 2 mg by mouth 2 (two) times daily.     isosorbide mononitrate (IMDUR) 60 MG 24 hr tablet TAKE 1 TABLET BY MOUTH DAILY    lisinopril (PRINIVIL,ZESTRIL) 2.5 MG tablet Take 2.5 mg by mouth once daily.      metoprolol (TOPROL XL) 50 MG 24 hr tablet Take 50 mg by mouth once daily.     Multi-Vitamin tablet Take 1 tablet by mouth once daily.     nitroGLYCERIN (NITROSTAT) 0.4 MG SL tablet Place 1 tablet (0.4 mg total) under the tongue every 5 (five) minutes as needed for Chest pain.    omeprazole (PRILOSEC) 20 MG capsule Take 20 mg by mouth once daily.    ONE TOUCH ULTRA TEST Strp     oxybutynin (DITROPAN) 5 MG Tab TAKE 1 TABLET BY MOUTH EVERY EVENING    PLAVIX 75 mg tablet Take 75 mg by mouth once daily.     predniSONE (DELTASONE) 10 MG tablet TAKE 1 TABLET (10MG TOTAL) BY MOUTH ONCE DAILY    ropinirole (REQUIP) 4 MG tablet Take 8 mg by mouth nightly.    tamsulosin (FLOMAX) 0.4 mg Cp24 Take 1 capsule by mouth every evening.     terazosin (HYTRIN) 1 MG capsule 1 capsule Twice daily.           Clinical Reference Information           Your Vitals Were     BP                   140/65           Blood Pressure          Most Recent Value    BP  (!)  140/65      Allergies as of 3/1/2017     No Known Drug Allergies      Immunizations Administered on Date of Encounter - 3/1/2017     None      Orders Placed During Today's Visit     Future Labs/Procedures Expected by Expires    CBC w/  DIFF  3/1/2017 4/30/2018    CMP  3/1/2017 4/30/2018    LDH  3/1/2017 4/30/2018    MG  3/1/2017 4/30/2018      [facility] Sign-Up     Activating your [facility] account is as easy as 1-2-3!     1) Visit [email or web address], select Sign Up Now, enter this activation code and your date of birth, then select Next.  Activation code not generated  Current Patient Portal Status: Account disabled      2) Create a username and password to use when you visit [facility] in the future and select a security question in case you lose your password and select Next.    3) Enter your e-mail address and click Sign Up!    Additional Information  If you have questions, please e-mail [email or web address] or call [phone] to talk to our [facility] staff. Remember, [facility] is NOT to be used for urgent needs. For medical emergencies, dial 911.         Smoking Cessation     If you would like to quit smoking:   You may be eligible for free services if you are a [place] resident and started smoking cigarettes before September 1, 1988.  Call the Smoking Cessation Trust ([facility]) toll free at ([phone] or [phone].   Call 8-796-QUIT-NOW if you do not meet the above criteria.            Language Assistance Services     ATTENTION: Language assistance services are available, free of charge. Please call [phone].      ATENCIÓN: Si habla español, tiene a rodriguez disposición servicios gratuitos de asistencia lingüística. Llame al [phone].     [facility] Ý: N?u b?n nói Ti?ng Vi?t, có các d?ch v? h? tr? ngôn ng? mi?n phí dành cho b?n. G?i s? [phone].         [facility] complies with applicable Federal civil rights laws and does not discriminate on the basis of race, color, national origin, age, disability, or sex.

## 2017-03-01 NOTE — TELEPHONE ENCOUNTER
Called patient to inform patient to come in tomorrow at 2 for procrit injection. Patient verbalized understanding

## 2017-03-02 ENCOUNTER — INFUSION (OUTPATIENT)
Dept: INFUSION THERAPY | Facility: HOSPITAL | Age: 82
End: 2017-03-02
Attending: INTERNAL MEDICINE
Payer: MEDICARE

## 2017-03-02 VITALS
DIASTOLIC BLOOD PRESSURE: 59 MMHG | SYSTOLIC BLOOD PRESSURE: 120 MMHG | TEMPERATURE: 98 F | HEART RATE: 75 BPM | RESPIRATION RATE: 18 BRPM

## 2017-03-02 DIAGNOSIS — R19.7 DIARRHEA, UNSPECIFIED TYPE: ICD-10-CM

## 2017-03-02 DIAGNOSIS — C61 PROSTATE CANCER: ICD-10-CM

## 2017-03-02 DIAGNOSIS — E86.0 DEHYDRATION: Primary | ICD-10-CM

## 2017-03-02 DIAGNOSIS — C79.51 BONE METASTASES: ICD-10-CM

## 2017-03-02 PROCEDURE — 63600175 PHARM REV CODE 636 W HCPCS: Mod: PN | Performed by: INTERNAL MEDICINE

## 2017-03-02 PROCEDURE — 96372 THER/PROPH/DIAG INJ SC/IM: CPT | Mod: PN

## 2017-03-02 RX ADMIN — ERYTHROPOIETIN 40000 UNITS: 40000 INJECTION, SOLUTION INTRAVENOUS; SUBCUTANEOUS at 02:03

## 2017-03-03 ENCOUNTER — TELEPHONE (OUTPATIENT)
Dept: PHARMACY | Facility: CLINIC | Age: 82
End: 2017-03-03

## 2017-03-09 ENCOUNTER — INFUSION (OUTPATIENT)
Dept: INFUSION THERAPY | Facility: HOSPITAL | Age: 82
End: 2017-03-09
Attending: INTERNAL MEDICINE
Payer: MEDICARE

## 2017-03-09 VITALS
SYSTOLIC BLOOD PRESSURE: 93 MMHG | HEART RATE: 84 BPM | RESPIRATION RATE: 16 BRPM | TEMPERATURE: 98 F | DIASTOLIC BLOOD PRESSURE: 49 MMHG

## 2017-03-09 DIAGNOSIS — E86.0 DEHYDRATION: Primary | ICD-10-CM

## 2017-03-09 DIAGNOSIS — C79.51 BONE METASTASES: ICD-10-CM

## 2017-03-09 DIAGNOSIS — R19.7 DIARRHEA, UNSPECIFIED TYPE: ICD-10-CM

## 2017-03-09 DIAGNOSIS — C61 PROSTATE CANCER: ICD-10-CM

## 2017-03-09 PROCEDURE — 96372 THER/PROPH/DIAG INJ SC/IM: CPT | Mod: PN

## 2017-03-09 PROCEDURE — 63600175 PHARM REV CODE 636 W HCPCS: Mod: PN | Performed by: NURSE PRACTITIONER

## 2017-03-09 RX ADMIN — ERYTHROPOIETIN 40000 UNITS: 40000 INJECTION, SOLUTION INTRAVENOUS; SUBCUTANEOUS at 01:03

## 2017-03-09 NOTE — PLAN OF CARE
Problem: Patient Care Overview (Adult)  Goal: Plan of Care Review  Outcome: Ongoing (interventions implemented as appropriate)  Tolerated Procrit injection well, discharged

## 2017-03-09 NOTE — MR AVS SNAPSHOT
Patient Information     Patient Name Sex Cameron Garcia Male 1935      Visit Information        Provider Department Dept Phone Center    3/9/2017 12:30 PM CHAIR 11, [facility] [phone] [facility]      Patient Instructions     None      Your Current Medications Are     abiraterone 250 mg Tab    aspirin (ECOTRIN) 81 MG EC tablet    atorvastatin (LIPITOR) 40 MG tablet    bismuth subsalicylate (PEPTO BISMOL) 262 mg/15 mL suspension    calcium carbonate-vit D3-min (CALCIUM-VITAMIN D) 600 mg calcium- 400 unit Tab    cilostazol (PLETAL) 100 MG Tab    fenofibrate (TRIGLIDE) 160 MG tablet    glimepiride (AMARYL) 2 MG tablet    isosorbide mononitrate (IMDUR) 60 MG 24 hr tablet    lisinopril (PRINIVIL,ZESTRIL) 2.5 MG tablet    metoprolol (TOPROL XL) 50 MG 24 hr tablet    Multi-Vitamin tablet    nitroGLYCERIN (NITROSTAT) 0.4 MG SL tablet    omeprazole (PRILOSEC) 20 MG capsule    ONE TOUCH ULTRA TEST Strp    oxybutynin (DITROPAN) 5 MG Tab    PLAVIX 75 mg tablet    predniSONE (DELTASONE) 10 MG tablet    ropinirole (REQUIP) 4 MG tablet    tamsulosin (FLOMAX) 0.4 mg Cp24    terazosin (HYTRIN) 1 MG capsule      Facility-Administered Medications     epoetin andrew injection 40,000 Units      Appointments for Next Year     3/16/2017  9:00 AM ESTABLISHED PATIENT (15 min.) [place] - Cardio Vascular Enrike Hartmann MD    Arrive at check-in approximately 15 minutes before your scheduled appointment time. Bring all outside medical records and imaging, along with a list of your current medications and insurance card.    2nd Floor    3/16/2017 12:30 PM INFUSION 030 MIN (30 min.) [facility] CHAIR 14, [facility] CHEMO    Arrive at check-in approximately 15 minutes before your scheduled appointment time. Bring all outside medical records and imaging, along with a list of your current medications and insurance card.    1st Floor    3/23/2017 11:15 AM NON FASTING LAB  (15 min.) [facility] LAB, [place]    Arrive at check-in approximately 15 minutes before your scheduled appointment time. Bring all outside medical records and imaging, along with a list of your current medications and insurance card.    1st Floor    3/29/2017  2:00 PM ESTABLISHED PATIENT (20 min.) [facility] - Hematology Chris Fernandez MD    Arrive at check-in approximately 15 minutes before your scheduled appointment time. Bring all outside medical records and imaging, along with a list of your current medications and insurance card.    3/29/2017  3:00 PM INFUSION 030 MIN (30 min.) [facility] CHAIR 24, [facility] CHE[facility]    Arrive at check-in approximately 15 minutes before your scheduled appointment time. Bring all outside medical records and imaging, along with a list of your current medications and insurance card.    1st Floor         Default Flowsheet Data (last 24 hours)      Amb Complex Vitals [place]        03/09/17 1237                Measurements    BP (!)  93/49        Temp 98.4 °F (36.9 °C)        Pulse 84        Resp 16                Allergies     No Known Drug Allergies       Medications You Received from 03/08/2017 1305 to 03/09/2017 1305        Date/Time Order Dose Route Action     03/09/2017 1305 epoetin andrew injection 40,000 Units 40,000 Units Subcutaneous Given      Current Discharge Medication List     Cannot display discharge medications since this is not an admission.

## 2017-03-14 DIAGNOSIS — I73.9 PERIPHERAL VASCULAR DISEASE, UNSPECIFIED: Primary | ICD-10-CM

## 2017-03-14 DIAGNOSIS — Z92.29 HX: LONG TERM ANTICOAGULANT USE: ICD-10-CM

## 2017-03-14 RX ORDER — SODIUM CHLORIDE 9 MG/ML
INJECTION, SOLUTION INTRAVENOUS CONTINUOUS
Status: CANCELLED | OUTPATIENT
Start: 2017-03-14

## 2017-03-16 ENCOUNTER — INFUSION (OUTPATIENT)
Dept: INFUSION THERAPY | Facility: HOSPITAL | Age: 82
End: 2017-03-16
Attending: INTERNAL MEDICINE
Payer: MEDICARE

## 2017-03-16 VITALS
SYSTOLIC BLOOD PRESSURE: 114 MMHG | TEMPERATURE: 98 F | WEIGHT: 169 LBS | HEART RATE: 74 BPM | RESPIRATION RATE: 17 BRPM | HEIGHT: 71 IN | BODY MASS INDEX: 23.66 KG/M2 | DIASTOLIC BLOOD PRESSURE: 57 MMHG

## 2017-03-16 DIAGNOSIS — C61 PROSTATE CANCER: ICD-10-CM

## 2017-03-16 DIAGNOSIS — C79.51 BONE METASTASES: ICD-10-CM

## 2017-03-16 DIAGNOSIS — R19.7 DIARRHEA, UNSPECIFIED TYPE: ICD-10-CM

## 2017-03-16 DIAGNOSIS — E86.0 DEHYDRATION: Primary | ICD-10-CM

## 2017-03-16 PROCEDURE — 63600175 PHARM REV CODE 636 W HCPCS: Mod: PN | Performed by: NURSE PRACTITIONER

## 2017-03-16 PROCEDURE — 96372 THER/PROPH/DIAG INJ SC/IM: CPT | Mod: PN

## 2017-03-16 RX ADMIN — ERYTHROPOIETIN 40000 UNITS: 40000 INJECTION, SOLUTION INTRAVENOUS; SUBCUTANEOUS at 12:03

## 2017-03-16 NOTE — PLAN OF CARE
Problem: Patient Care Overview (Adult)  Goal: Plan of Care Review  Outcome: Ongoing (interventions implemented as appropriate)  Pt tolerated injection well without complaints. D/C to home with steady gait. Pt teaching done on falls and Procrit. Pt verbalized understanding. Handout given.

## 2017-03-16 NOTE — MR AVS SNAPSHOT
Patient Information     Patient Name Sex Cameron Garcia Male 1935      Visit Information        Provider Department [facility]    3/16/2017 12:30 PM CHAIR 13, [facility] [phone] [facility]      Patient Instructions      Epoetin Nico injection  What is this medicine?  EPOETIN NICO (e CHYNA e tin AL fa) helps your body make more red blood cells. This medicine is used to treat anemia caused by chronic kidney failure, cancer chemotherapy, or HIV-therapy. It may also be used before surgery if you have anemia.  How should I use this medicine?  This medicine is for injection into a vein or under the skin. It is usually given by a health care professional in a hospital or clinic setting.  If you get this medicine at home, you will be taught how to prepare and give this medicine. Use exactly as directed. Take your medicine at regular intervals. Do not take your medicine more often than directed.  It is important that you put your used needles and syringes in a special sharps container. Do not put them in a trash can. If you do not have a sharps container, call your pharmacist or healthcare provider to get one.  Talk to your pediatrician regarding the use of this medicine in children. While this drug may be prescribed for selected conditions, precautions do apply.  What side effects may I notice from receiving this medicine?  Side effects that you should report to your doctor or health care professional as soon as possible:  · allergic reactions like skin rash, itching or hives, swelling of the face, lips, or tongue  · breathing problems  · changes in vision  · chest pain  · confusion, trouble speaking or understanding  · feeling faint or lightheaded, falls  · high blood pressure  · muscle aches or pains  · pain, swelling, warmth in the leg  · rapid weight gain  · severe headaches  · sudden numbness or weakness of the face, arm or leg  · trouble walking,  dizziness, loss of balance or coordination  · seizures (convulsions)  · swelling of the ankles, feet, hands  · unusually weak or tired  Side effects that usually do not require medical attention (report to your doctor or health care professional if they continue or are bothersome):  · diarrhea  · fever, chills (flu-like symptoms)  · headaches  · nausea, vomiting  · redness, stinging, or swelling at site where injected  What may interact with this medicine?  Do not take this medicine with any of the following medications:  · darbepoetin andrew  What if I miss a dose?  If you miss a dose, take it as soon as you can. If it is almost time for your next dose, take only that dose. Do not take double or extra doses.  Where should I keep my medicine?  Keep out of the reach of children.  Store in a refrigerator between 2 and 8 degrees C (36 and 46 degrees F). Do not freeze or shake. Throw away any unused portion if using a single-dose vial. Multi-dose vials can be kept in the refrigerator for up to 21 days after the initial dose. Throw away unused medicine.  What should I tell my health care provider before I take this medicine?  They need to know if you have any of these conditions:  · blood clotting disorders  · cancer patient not on chemotherapy  · cystic fibrosis  · heart disease, such as angina or heart failure  · hemoglobin level of 12 g/dL or greater  · high blood pressure  · low levels of folate, iron, or vitamin B12  · seizures  · an unusual or allergic reaction to erythropoietin, albumin, benzyl alcohol, hamster proteins, other medicines, foods, dyes, or preservatives  · pregnant or trying to get pregnant  · breast-feeding  What should I watch for while using this medicine?  Visit your prescriber or health care professional for regular checks on your progress and for the needed blood tests and blood pressure measurements. It is especially important for the doctor to make sure your hemoglobin level is in the desired  range, to limit the risk of potential side effects and to give you the best benefit. Keep all appointments for any recommended tests. Check your blood pressure as directed. Ask your doctor what your blood pressure should be and when you should contact him or her.  As your body makes more red blood cells, you may need to take iron, folic acid, or vitamin B supplements. Ask your doctor or health care provider which products are right for you. If you have kidney disease continue dietary restrictions, even though this medication can make you feel better. Talk with your doctor or health care professional about the foods you eat and the vitamins that you take.  Date Last Reviewed:   NOTE:This sheet is a summary. It may not cover all possible information. If you have questions about this medicine, talk to your doctor, pharmacist, or health care provider. Copyright© 2016 [employer]        Preventing Falls: Are You At Risk of Falling?  As you get older, you're not as steady on your feet as you once were. And you may have health problems you didn't have when you were younger. So, it's not surprising that older people are more likely to trip and fall. Falling can be very serious. It can change your overall health and quality of life. That's why it's important to be aware of your own risk of falling.    The dangers of falling  Falls are one of the main causes of injury in people over age 65. An older person who falls may take longer to get better than a younger person. And, after a fall, an older person is more likely to have problems that don't go away. So, preventing falls can help you avoid serious health problems.  Are you at risk of falling?  Answer these questions to rate your level of risk.  · Are you a woman?  · Have you fallen or stumbled in the last year?  · Are you over age 65?  · Are you ever dizzy or lightheaded with standing?  · Do you have a hard time getting in and out of the bathtub or on and off the  "toilet?  · Do you lean on objects to help you get around? Or do you use a cane or walker?  · Do you have vision or hearing problems? For example, do you need new glasses or hearing aids?  · Do you have 2 or more long-lasting (chronic) medical conditions?  · Do you take 3 or more medicines?  · Have you felt depressed recently?  · Have you had more trouble with your memory in recent months?  · Are there hazards in your home that might cause you to fall, such as loose rugs or poor lighting?  · Do you have a pet that jumps on you or might trip you?  · Have you stopped getting regular exercise?  · Do you have diabetes?   · Do you have a neurologic disease, such as Parkinson or Alzheimer disease?   · Do you drink alcohol?  · Do you wear athletic shoes or slippers, or go barefoot at home?  You can help prevent falls  If you answered "yes" to any of the above questions, you should take steps to reduce your risk of a fall. Monitoring health conditions and keeping walkways in your home free of clutter are just 2 ways. Changing is sometimes easier said than done. But keep in mind that even small changes can make you less likely to fall.  The fear of falling  It's normal to be scared of falling, especially if you've fallen before. But being afraid can actually make you more likely to fall. This is because:  · Fear might cause you to become less active. Being less active can lead to a loss of strength and balance.  · Fear can lead to isolation from others, depression, or the use of more medicines or alcohol. And all these things make falling even more likely.  To break the cycle, learn more about ways to avoid falling. As you take control, you may find yourself feeling less afraid.   Date Last Reviewed: 6/12/2015  © 8451-7358 [employer]. [address] [place] [address], [place] [address]. All rights reserved. This information is not intended as a substitute for professional medical care. Always follow your healthcare " "professional's instructions.        Exercises to Prevent Falls  Certain types of exercises may help make you less likely to fall. Try the ones below. Or do other exercises that your health care provider suggests. Depending on your health, you may need to start slowly. Don't let that stop you. Even small amounts of exercise can help you. Be sure to talk to your health care provider before starting any exercise program.    Improve balance  Many types of exercise can help improve balance. Mayo chi and yoga are good examples. Here's another one to try. You can do it anytime and almost anywhere.  · Stand next to a counter or solid support.  · Push yourself up onto your tiptoes.  · Hold for 5 seconds. If you start to lose your balance, hold on to the counter.  · Rest and repeat 5 times. Work up to holding for 20 to 30 seconds, if you can.    Increase flexibility  Being more flexible makes it easier for you to move around safely. Try exercises like the seated hamstring stretch.  · Sit in a chair and put one foot on a stool.  · Straighten your leg and reach with both hands down either side of your leg. Reach as far down your leg as you can.  · Hold for about 20 seconds.  · Go back to the starting position. Then repeat 5 times. Switch legs.    Build strength  "Resistance" exercises help build strength. You can do them without equipment. Or you can use weights, elastic bands, or special machines. One such exercise is called the biceps curl. You can hold a 1-pound weight or even a can of soup. Do this exercise at least 3 times a week. Strive for every day.  · Sit up straight in a chair.  · Keep your elbow close to your body and your wrist straight.  · Bend your arm, moving your hand up to your shoulder. Then slowly lower your arm.  · Repeat 5 times. Switch to the other arm.    Build your staying power  Aerobic exercises make your heart and lungs stronger so you can keep moving longer. Walking and swimming are two of the best " types of exercises you can do. Using a stationary bike is great, too. Find an aerobic exercise that you enjoy. Start slowly and build up. Even 5 minutes is helpful. Aim for a goal of 30 minutes, at least 3 times a week. You don't have to do 30 minutes in 1 session. Break it up and walk a little throughout the day.     More helpful tips  · Start easy. Slowly work up to doing more.  · Talk with your health care provider about the best exercises for you.  · Call senior centers or health clubs about exercise programs.  · If needed, have a family member watch you walk every so often to check your stability.  · Exercise with a friend. Choose an activity you both enjoy.  · Consider lyndsey chi or yoga to strengthen your balance.  · Try exercises that you can do anytime, anywhere. Here are 2 examples. Have someone with you when you first try these:  ¨ Practice walking by placing 1 foot right in front of the other.  ¨ Stand up and sit down 10 times. Repeat this throughout the day.   Date Last Reviewed: 6/13/2015  © 2372-0207 [employer]. [address] [place] [address]. All rights reserved. This information is not intended as a substitute for professional medical care. Always follow your healthcare professional's instructions.             Your Current Medications Are     abiraterone 250 mg Tab    aspirin (ECOTRIN) 81 MG EC tablet    atorvastatin (LIPITOR) 40 MG tablet    bismuth subsalicylate (PEPTO BISMOL) 262 mg/15 mL suspension    calcium carbonate-vit D3-min (CALCIUM-VITAMIN D) 600 mg calcium- 400 unit Tab    cilostazol (PLETAL) 100 MG Tab    fenofibrate (TRIGLIDE) 160 MG tablet    glimepiride (AMARYL) 2 MG tablet    isosorbide mononitrate (IMDUR) 60 MG 24 hr tablet    lisinopril (PRINIVIL,ZESTRIL) 2.5 MG tablet    metoprolol (TOPROL XL) 50 MG 24 hr tablet    Multi-Vitamin tablet    nitroGLYCERIN (NITROSTAT) 0.4 MG SL tablet    omeprazole (PRILOSEC) 20 MG capsule    ONE TOUCH ULTRA TEST Strp     "oxybutynin (DITROPAN) 5 MG Tab    PLAVIX 75 mg tablet    predniSONE (DELTASONE) 10 MG tablet    ropinirole (REQUIP) 4 MG tablet    tamsulosin (FLOMAX) 0.4 mg Cp24    terazosin (HYTRIN) 1 MG capsule      Facility-Administered Medications     epoetin andrew injection 40,000 Units      Appointments for Next Year     3/23/2017 11:15 AM NON FASTING LAB (15 min.) [facility], [place]    Arrive at check-in approximately 15 minutes before your scheduled appointment time. Bring all outside medical records and imaging, along with a list of your current medications and insurance card.    1st Floor    3/28/2017 12:45 PM PRE-ADMIT TESTING VISIT (60 min.) [facility] PRE-ADMIT NURSE TWOGIOVANI    Arrive at check-in approximately 15 minutes before your scheduled appointment time. Bring all outside medical records and imaging, along with a list of your current medications and insurance card.    3/29/2017  2:00 PM ESTABLISHED PATIENT (20 min.) [facility] - Hematology Chris Fernandez MD    Arrive at check-in approximately 15 minutes before your scheduled appointment time. Bring all outside medical records and imaging, along with a list of your current medications and insurance card.    3/29/2017  3:00 PM INFUSION 030 MIN (30 min.) [facility] CHAIR 33, [facility]    Arrive at check-in approximately 15 minutes before your scheduled appointment time. Bring all outside medical records and imaging, along with a list of your current medications and insurance card.    1st Floor         Default Flowsheet Data (last 24 hours)      Amb Complex Vitals Talib        03/16/17 1250                Measurements    Weight 76.7 kg (169 lb)        Height 5' 11" (1.803 m)        BSA (Calculated - sq m) 1.96 sq meters        BMI (Calculated) 23.6        BP (!)  114/57        Temp 97.8 °F (36.6 °C)        Pulse 74        Resp 17        Pain Assessment    Pain Score Four        Pain Frequency " 2 Continuous        Pain Loc LEG   vascular issues chronic                Allergies     No Known Drug Allergies       Medications You Received from 03/15/2017 1256 to 03/16/2017 1256        Date/Time Order Dose Route Action     03/16/2017 1255 epoetin andrew injection 40,000 Units 40,000 Units Subcutaneous Given      Current Discharge Medication List     Cannot display discharge medications since this is not an admission.

## 2017-03-16 NOTE — PLAN OF CARE
Problem: Activity Intolerance (Adult)  Goal: Activity Tolerance  Patient will demonstrate the desired outcomes by discharge/transition of care.   Outcome: Ongoing (interventions implemented as appropriate)  May have a better outcome after vascular surgery.

## 2017-03-16 NOTE — PATIENT INSTRUCTIONS
Epoetin Nico injection  What is this medicine?  EPOETIN NICO (e CHYNA e tin AL fa) helps your body make more red blood cells. This medicine is used to treat anemia caused by chronic kidney failure, cancer chemotherapy, or HIV-therapy. It may also be used before surgery if you have anemia.  How should I use this medicine?  This medicine is for injection into a vein or under the skin. It is usually given by a health care professional in a hospital or clinic setting.  If you get this medicine at home, you will be taught how to prepare and give this medicine. Use exactly as directed. Take your medicine at regular intervals. Do not take your medicine more often than directed.  It is important that you put your used needles and syringes in a special sharps container. Do not put them in a trash can. If you do not have a sharps container, call your pharmacist or healthcare provider to get one.  Talk to your pediatrician regarding the use of this medicine in children. While this drug may be prescribed for selected conditions, precautions do apply.  What side effects may I notice from receiving this medicine?  Side effects that you should report to your doctor or health care professional as soon as possible:  · allergic reactions like skin rash, itching or hives, swelling of the face, lips, or tongue  · breathing problems  · changes in vision  · chest pain  · confusion, trouble speaking or understanding  · feeling faint or lightheaded, falls  · high blood pressure  · muscle aches or pains  · pain, swelling, warmth in the leg  · rapid weight gain  · severe headaches  · sudden numbness or weakness of the face, arm or leg  · trouble walking, dizziness, loss of balance or coordination  · seizures (convulsions)  · swelling of the ankles, feet, hands  · unusually weak or tired  Side effects that usually do not require medical attention (report to your doctor or health care professional if they continue or are  bothersome):  · diarrhea  · fever, chills (flu-like symptoms)  · headaches  · nausea, vomiting  · redness, stinging, or swelling at site where injected  What may interact with this medicine?  Do not take this medicine with any of the following medications:  · darbepoetin andrew  What if I miss a dose?  If you miss a dose, take it as soon as you can. If it is almost time for your next dose, take only that dose. Do not take double or extra doses.  Where should I keep my medicine?  Keep out of the reach of children.  Store in a refrigerator between 2 and 8 degrees C (36 and 46 degrees F). Do not freeze or shake. Throw away any unused portion if using a single-dose vial. Multi-dose vials can be kept in the refrigerator for up to 21 days after the initial dose. Throw away unused medicine.  What should I tell my health care provider before I take this medicine?  They need to know if you have any of these conditions:  · blood clotting disorders  · cancer patient not on chemotherapy  · cystic fibrosis  · heart disease, such as angina or heart failure  · hemoglobin level of 12 g/dL or greater  · high blood pressure  · low levels of folate, iron, or vitamin B12  · seizures  · an unusual or allergic reaction to erythropoietin, albumin, benzyl alcohol, hamster proteins, other medicines, foods, dyes, or preservatives  · pregnant or trying to get pregnant  · breast-feeding  What should I watch for while using this medicine?  Visit your prescriber or health care professional for regular checks on your progress and for the needed blood tests and blood pressure measurements. It is especially important for the doctor to make sure your hemoglobin level is in the desired range, to limit the risk of potential side effects and to give you the best benefit. Keep all appointments for any recommended tests. Check your blood pressure as directed. Ask your doctor what your blood pressure should be and when you should contact him or her.  As  your body makes more red blood cells, you may need to take iron, folic acid, or vitamin B supplements. Ask your doctor or health care provider which products are right for you. If you have kidney disease continue dietary restrictions, even though this medication can make you feel better. Talk with your doctor or health care professional about the foods you eat and the vitamins that you take.  Date Last Reviewed:   NOTE:This sheet is a summary. It may not cover all possible information. If you have questions about this medicine, talk to your doctor, pharmacist, or health care provider. Copyright© 2016 [employer]        Preventing Falls: Are You At Risk of Falling?  As you get older, you're not as steady on your feet as you once were. And you may have health problems you didn't have when you were younger. So, it's not surprising that older people are more likely to trip and fall. Falling can be very serious. It can change your overall health and quality of life. That's why it's important to be aware of your own risk of falling.    The dangers of falling  Falls are one of the main causes of injury in people over age 65. An older person who falls may take longer to get better than a younger person. And, after a fall, an older person is more likely to have problems that don't go away. So, preventing falls can help you avoid serious health problems.  Are you at risk of falling?  Answer these questions to rate your level of risk.  · Are you a woman?  · Have you fallen or stumbled in the last year?  · Are you over age 65?  · Are you ever dizzy or lightheaded with standing?  · Do you have a hard time getting in and out of the bathtub or on and off the toilet?  · Do you lean on objects to help you get around? Or do you use a cane or walker?  · Do you have vision or hearing problems? For example, do you need new glasses or hearing aids?  · Do you have 2 or more long-lasting (chronic) medical conditions?  · Do you take 3 or  "more medicines?  · Have you felt depressed recently?  · Have you had more trouble with your memory in recent months?  · Are there hazards in your home that might cause you to fall, such as loose rugs or poor lighting?  · Do you have a pet that jumps on you or might trip you?  · Have you stopped getting regular exercise?  · Do you have diabetes?   · Do you have a neurologic disease, such as Parkinson or Alzheimer disease?   · Do you drink alcohol?  · Do you wear athletic shoes or slippers, or go barefoot at home?  You can help prevent falls  If you answered "yes" to any of the above questions, you should take steps to reduce your risk of a fall. Monitoring health conditions and keeping walkways in your home free of clutter are just 2 ways. Changing is sometimes easier said than done. But keep in mind that even small changes can make you less likely to fall.  The fear of falling  It's normal to be scared of falling, especially if you've fallen before. But being afraid can actually make you more likely to fall. This is because:  · Fear might cause you to become less active. Being less active can lead to a loss of strength and balance.  · Fear can lead to isolation from others, depression, or the use of more medicines or alcohol. And all these things make falling even more likely.  To break the cycle, learn more about ways to avoid falling. As you take control, you may find yourself feeling less afraid.   Date Last Reviewed: 6/12/2015  © 2144-6518 [employer]. [address] [place] [address], [place] [address]. All rights reserved. This information is not intended as a substitute for professional medical care. Always follow your healthcare professional's instructions.        Exercises to Prevent Falls  Certain types of exercises may help make you less likely to fall. Try the ones below. Or do other exercises that your health care provider suggests. Depending on your health, you may need to start slowly. Don't let " "that stop you. Even small amounts of exercise can help you. Be sure to talk to your health care provider before starting any exercise program.    Improve balance  Many types of exercise can help improve balance. Mayo chi and yoga are good examples. Here's another one to try. You can do it anytime and almost anywhere.  · Stand next to a counter or solid support.  · Push yourself up onto your tiptoes.  · Hold for 5 seconds. If you start to lose your balance, hold on to the counter.  · Rest and repeat 5 times. Work up to holding for 20 to 30 seconds, if you can.    Increase flexibility  Being more flexible makes it easier for you to move around safely. Try exercises like the seated hamstring stretch.  · Sit in a chair and put one foot on a stool.  · Straighten your leg and reach with both hands down either side of your leg. Reach as far down your leg as you can.  · Hold for about 20 seconds.  · Go back to the starting position. Then repeat 5 times. Switch legs.    Build strength  "Resistance" exercises help build strength. You can do them without equipment. Or you can use weights, elastic bands, or special machines. One such exercise is called the biceps curl. You can hold a 1-pound weight or even a can of soup. Do this exercise at least 3 times a week. Strive for every day.  · Sit up straight in a chair.  · Keep your elbow close to your body and your wrist straight.  · Bend your arm, moving your hand up to your shoulder. Then slowly lower your arm.  · Repeat 5 times. Switch to the other arm.    Build your staying power  Aerobic exercises make your heart and lungs stronger so you can keep moving longer. Walking and swimming are two of the best types of exercises you can do. Using a stationary bike is great, too. Find an aerobic exercise that you enjoy. Start slowly and build up. Even 5 minutes is helpful. Aim for a goal of 30 minutes, at least 3 times a week. You don't have to do 30 minutes in 1 session. Break it up and " walk a little throughout the day.     More helpful tips  · Start easy. Slowly work up to doing more.  · Talk with your health care provider about the best exercises for you.  · Call senior centers or health clubs about exercise programs.  · If needed, have a family member watch you walk every so often to check your stability.  · Exercise with a friend. Choose an activity you both enjoy.  · Consider lyndsey chi or yoga to strengthen your balance.  · Try exercises that you can do anytime, anywhere. Here are 2 examples. Have someone with you when you first try these:  ¨ Practice walking by placing 1 foot right in front of the other.  ¨ Stand up and sit down 10 times. Repeat this throughout the day.   Date Last Reviewed: 6/13/2015  © 6427-4372 [employer]. [address] [place] [address], [place] [address]. All rights reserved. This information is not intended as a substitute for professional medical care. Always follow your healthcare professional's instructions.

## 2017-03-20 ENCOUNTER — TELEPHONE (OUTPATIENT)
Dept: HEMATOLOGY/ONCOLOGY | Facility: CLINIC | Age: 82
End: 2017-03-20

## 2017-03-23 ENCOUNTER — TELEPHONE (OUTPATIENT)
Dept: VASCULAR SURGERY | Facility: CLINIC | Age: 82
End: 2017-03-23

## 2017-03-23 ENCOUNTER — INFUSION (OUTPATIENT)
Dept: INFUSION THERAPY | Facility: HOSPITAL | Age: 82
End: 2017-03-23
Attending: INTERNAL MEDICINE
Payer: MEDICARE

## 2017-03-23 VITALS
SYSTOLIC BLOOD PRESSURE: 107 MMHG | HEART RATE: 70 BPM | RESPIRATION RATE: 16 BRPM | WEIGHT: 168.88 LBS | TEMPERATURE: 98 F | BODY MASS INDEX: 23.56 KG/M2 | DIASTOLIC BLOOD PRESSURE: 59 MMHG

## 2017-03-23 DIAGNOSIS — C79.51 BONE METASTASES: ICD-10-CM

## 2017-03-23 DIAGNOSIS — R19.7 DIARRHEA, UNSPECIFIED TYPE: ICD-10-CM

## 2017-03-23 DIAGNOSIS — E86.0 DEHYDRATION: Primary | ICD-10-CM

## 2017-03-23 DIAGNOSIS — C61 PROSTATE CANCER: ICD-10-CM

## 2017-03-23 PROCEDURE — 63600175 PHARM REV CODE 636 W HCPCS: Mod: PN | Performed by: NURSE PRACTITIONER

## 2017-03-23 PROCEDURE — 96372 THER/PROPH/DIAG INJ SC/IM: CPT | Mod: PN

## 2017-03-23 RX ORDER — DIPHENOXYLATE HYDROCHLORIDE AND ATROPINE SULFATE 2.5; .025 MG/1; MG/1
TABLET ORAL
Refills: 2 | COMMUNITY
Start: 2017-03-07 | End: 2017-07-19

## 2017-03-23 RX ADMIN — ERYTHROPOIETIN 40000 UNITS: 40000 INJECTION, SOLUTION INTRAVENOUS; SUBCUTANEOUS at 11:03

## 2017-03-23 NOTE — TELEPHONE ENCOUNTER
----- Message from Rosmery Ambrosio sent at 3/23/2017 10:51 AM CDT -----  Contact: PAtient  Patient needs to discuss his medication about what to do about his cancer medicine while he is in the hospital. Please call patient [phone]

## 2017-03-23 NOTE — PLAN OF CARE
Problem: Patient Care Overview (Adult)  Goal: Plan of Care Review  Outcome: Ongoing (interventions implemented as appropriate)  Tolerated procrit injection without any c/o; RTC as directed; Verb agreement with plan; amb off unit independently by self

## 2017-03-28 ENCOUNTER — INFUSION (OUTPATIENT)
Dept: INFUSION THERAPY | Facility: HOSPITAL | Age: 82
End: 2017-03-28
Attending: INTERNAL MEDICINE
Payer: MEDICARE

## 2017-03-28 ENCOUNTER — OFFICE VISIT (OUTPATIENT)
Dept: HEMATOLOGY/ONCOLOGY | Facility: CLINIC | Age: 82
End: 2017-03-28
Payer: MEDICARE

## 2017-03-28 VITALS
SYSTOLIC BLOOD PRESSURE: 122 MMHG | HEART RATE: 66 BPM | WEIGHT: 164.44 LBS | DIASTOLIC BLOOD PRESSURE: 55 MMHG | OXYGEN SATURATION: 96 % | BODY MASS INDEX: 23.02 KG/M2 | HEIGHT: 71 IN | RESPIRATION RATE: 18 BRPM | TEMPERATURE: 98 F

## 2017-03-28 DIAGNOSIS — D64.81 ANEMIA ASSOCIATED WITH CHEMOTHERAPY: ICD-10-CM

## 2017-03-28 DIAGNOSIS — E86.0 DEHYDRATION: ICD-10-CM

## 2017-03-28 DIAGNOSIS — C79.51 BONE METASTASES: ICD-10-CM

## 2017-03-28 DIAGNOSIS — R19.7 DIARRHEA, UNSPECIFIED TYPE: ICD-10-CM

## 2017-03-28 DIAGNOSIS — E86.0 DEHYDRATION: Primary | ICD-10-CM

## 2017-03-28 DIAGNOSIS — C61 PROSTATE CANCER: Primary | ICD-10-CM

## 2017-03-28 DIAGNOSIS — C61 PROSTATE CANCER: ICD-10-CM

## 2017-03-28 DIAGNOSIS — T45.1X5A ANEMIA ASSOCIATED WITH CHEMOTHERAPY: ICD-10-CM

## 2017-03-28 PROCEDURE — 1160F RVW MEDS BY RX/DR IN RCRD: CPT | Mod: S$GLB,,, | Performed by: NURSE PRACTITIONER

## 2017-03-28 PROCEDURE — 96372 THER/PROPH/DIAG INJ SC/IM: CPT | Mod: PN

## 2017-03-28 PROCEDURE — 1157F ADVNC CARE PLAN IN RCRD: CPT | Mod: S$GLB,,, | Performed by: NURSE PRACTITIONER

## 2017-03-28 PROCEDURE — 3078F DIAST BP <80 MM HG: CPT | Mod: S$GLB,,, | Performed by: NURSE PRACTITIONER

## 2017-03-28 PROCEDURE — 63600175 PHARM REV CODE 636 W HCPCS: Mod: PN | Performed by: NURSE PRACTITIONER

## 2017-03-28 PROCEDURE — 99999 PR PBB SHADOW E&M-EST. PATIENT-LVL V: CPT | Mod: PBBFAC,,, | Performed by: NURSE PRACTITIONER

## 2017-03-28 PROCEDURE — 1159F MED LIST DOCD IN RCRD: CPT | Mod: S$GLB,,, | Performed by: NURSE PRACTITIONER

## 2017-03-28 PROCEDURE — 3074F SYST BP LT 130 MM HG: CPT | Mod: S$GLB,,, | Performed by: NURSE PRACTITIONER

## 2017-03-28 PROCEDURE — 96401 CHEMO ANTI-NEOPL SQ/IM: CPT | Mod: PN

## 2017-03-28 PROCEDURE — 1125F AMNT PAIN NOTED PAIN PRSNT: CPT | Mod: S$GLB,,, | Performed by: NURSE PRACTITIONER

## 2017-03-28 PROCEDURE — 99214 OFFICE O/P EST MOD 30 MIN: CPT | Mod: S$GLB,,, | Performed by: NURSE PRACTITIONER

## 2017-03-28 RX ADMIN — DENOSUMAB 120 MG: 120 INJECTION SUBCUTANEOUS at 03:03

## 2017-03-28 NOTE — MR AVS SNAPSHOT
[facility]  [address]  [facility] [address]  Phone: [phone]  Fax: [phone]                  Cameron Bridges   3/28/2017 2:20 PM   Office Visit    Description:  Male : 1935   Provider:  Fransico Davidson NP   Department:  [facility] - Hematology           Diagnoses this Visit        Comments    Prostate cancer    -  Primary     Bone metastases                To Do List           Future Appointments        Provider Department Dept Phone    3/28/2017 3:00 PM CHAIR 23, [facility] [phone]    2017 2:30 PM CHAIR 14, [facility] [phone]      Your Future Surgeries/Procedures     Mar 29, 2017   Surgery with Enriek Hartmann MD   [facility]    [address] [address]   [phone]              Goals (5 Years of Data)     None      [facility]     [facility] -  Assistance  Registered nurses in the [facility] provide clinical advisement, health education, appointment booking, and other advisory services.  Call for this free service at [phone].             Medications           Message regarding Medications     Verify the changes and/or additions to your medication regime listed below are the same as discussed with your clinician today.  If any of these changes or additions are incorrect, please notify your healthcare provider.        STOP taking these medications     cilostazol (PLETAL) 100 MG Tab Take 1 tablet (100 mg total) by mouth 2 (two) times daily.    mupirocin (BACTROBAN) 2 % ointment 1 g by Nasal route 2 (two) times daily.    PLAVIX 75 mg tablet Take 75 mg by mouth once daily.            Verify that the below list of medications is an accurate representation of the medications you are currently taking.  If none reported, the list may be blank. If incorrect, please contact your  healthcare provider. Carry this list with you in case of emergency.           Current Medications     abiraterone 250 mg Tab Take 4 tablets (1,000 mg total) by mouth once daily.    aspirin (ECOTRIN) 81 MG EC tablet Take 1 tablet by mouth Every 3 (three) days.     atorvastatin (LIPITOR) 40 MG tablet Take 1 tablet by mouth every evening.     bismuth subsalicylate (PEPTO BISMOL) 262 mg/15 mL suspension Take 15 mLs by mouth every 6 (six) hours as needed for Indigestion.    calcium carbonate-vit D3-min (CALCIUM-VITAMIN D) 600 mg calcium- 400 unit Tab Take 1 tablet by mouth once daily.     chlorhexidine (PERIDEX) 0.12 % solution Use as directed 10 mLs in the mouth or throat 2 (two) times daily.    diphenoxylate-atropine 2.5-0.025 mg (LOMOTIL) 2.5-0.025 mg per tablet TAKE 2 TABLETS BY MOUTH FOUR TIMES DAILY AS NEEDED FOR DIARRHEA    fenofibrate (TRIGLIDE) 160 MG tablet Take 160 mg by mouth once daily.      glimepiride (AMARYL) 2 MG tablet Take 2 mg by mouth 2 (two) times daily.     isosorbide mononitrate (IMDUR) 60 MG 24 hr tablet TAKE 1 TABLET BY MOUTH DAILY    lisinopril (PRINIVIL,ZESTRIL) 2.5 MG tablet Take 2.5 mg by mouth once daily.      metoprolol (TOPROL XL) 50 MG 24 hr tablet Take 50 mg by mouth once daily.     Multi-Vitamin tablet Take 1 tablet by mouth once daily.     nitroGLYCERIN (NITROSTAT) 0.4 MG SL tablet Place 1 tablet (0.4 mg total) under the tongue every 5 (five) minutes as needed for Chest pain.    omeprazole (PRILOSEC) 20 MG capsule Take 20 mg by mouth once daily.    oxybutynin (DITROPAN) 5 MG Tab TAKE 1 TABLET BY MOUTH EVERY EVENING    predniSONE (DELTASONE) 10 MG tablet TAKE 1 TABLET (10MG TOTAL) BY MOUTH ONCE DAILY    ropinirole (REQUIP) 4 MG tablet Take 8 mg by mouth nightly.    tamsulosin (FLOMAX) 0.4 mg Cp24 Take 1 capsule by mouth every evening.     terazosin (HYTRIN) 1 MG capsule 1 capsule Twice daily.    ONE TOUCH ULTRA TEST Strp            Clinical Reference Information           Your Vitals  "Were     BP Pulse Temp Resp Height Weight    122/55 66 98.4 °F (36.9 °C) 18 5' 11" (1.803 m) 74.6 kg (164 lb 7.4 oz)    BMI                22.94 kg/m2          Blood Pressure          Most Recent Value    BP  (!)  122/55      Allergies as of 3/28/2017     No Known Drug Allergies      Immunizations Administered on Date of Encounter - 3/28/2017     None      Orders Placed During Today's Visit     Future Labs/Procedures Expected by Expires    CBC w/ DIFF  3/28/2017 5/27/2018      [facility] Sign-Up     Activating your [facility] account is as easy as 1-2-3!     1) Visit [email or web address], select Sign Up Now, enter this activation code and your date of birth, then select Next.  Activation code not generated  Current Patient Portal Status: Account disabled      2) Create a username and password to use when you visit [facility] in the future and select a security question in case you lose your password and select Next.    3) Enter your e-mail address and click Sign Up!    Additional Information  If you have questions, please e-mail [email or web address] or call [phone] to talk to our [facility] staff. Remember, [facility] is NOT to be used for urgent needs. For medical emergencies, dial 911.         Smoking Cessation     If you would like to quit smoking:   You may be eligible for free services if you are a [place] resident and started smoking cigarettes before September 1, 1988.  Call the Smoking Cessation Trust ([facility]) toll free at (086) 856-6095 or (670) 044-3079.   Call 1-800-QUIT-NOW if you do not meet the above criteria.            Language Assistance Services     ATTENTION: Language assistance services are available, free of charge. Please call [phone].      ATENCIÓN: Si habla timoteo, tiene a rodriguez disposición servicios gratuitos de asistencia lingüística. Llame al [phone].     CHÚ Ý: N?u b?n nói Ti?ng Vi?t, có các d?ch v? h? tr? ngôn ng? mi?n phí dành cho b?n. G?i s? [phone].         [facility] - " Hematology complies with applicable Federal civil rights laws and does not discriminate on the basis of race, color, national origin, age, disability, or sex.

## 2017-03-28 NOTE — PROGRESS NOTES
HISTORY OF PRESENT ILLNESS:  This is an 81-year-old white gentleman known to Dr. Fernandez for prostate carcinoma involving bone, as well as indeterminate   pulmonary nodules.  He is scheduled for imaging in regards to his pulmonary   nodules in August 2017.  The patient presents to the clinic today earlier than   scheduled for evaluation as he will be undergoing femoral bypass surgery   tomorrow with Dr. Hartmann involving his right lower extremity.  He states he   has been free from fevers, chills, illness, painful lymphadenopathy, abdominal   discomfort, nausea, vomiting, constipation, diarrhea, etc.  He states his only   problem is that his right lower extremity is in constant pain as no blood is circulating.    No other new complaints or pertinent findings on a 14-point review of systems.    PHYSICAL EXAMINATION:  GENERAL:  Well-developed, elderly, thin white gentleman in no acute distress.    Alert and oriented x3.  VITAL SIGNS:  Weight 164.5 pounds (decrease of 2 pounds in four weeks),   /55, pulse 66, respirations 18, temperature 98.4, O2 sats 96.  HEENT:  Normocephalic, atraumatic.  Oral mucosa pink and moist.  Lips without   lesions.  Tongue midline.  Oropharynx clear.  Nonicteric sclerae.  NECK:  Supple, no adenopathy.  No carotid bruits, thyromegaly or thyroid nodule.  HEART:  Regular rate and rhythm without murmur, gallop or rub.  LUNGS:  Clear to auscultation bilaterally.  Normal respiratory effort.  ABDOMEN:  Soft, nontender, nondistended with positive normoactive bowel sounds,   no hepatosplenomegaly.  EXTREMITIES:  Mild pedal edema bilateral.  No cyanosis or clubbing.  Distal   pulses are intact.  AXILLAE AND GROIN:  No palpable pathologic lymphadenopathy is appreciated.  SKIN:  Intact/turgor normal.  NEUROLOGIC:  Cranial nerves II-XII grossly intact.  Motor:  Good muscle bulk and   tone.  Strength/sensory 5/5 throughout.  Gait stable.    LABORATORY:  Dated 03/23/2017, WBCs 4.65, hemoglobin  9.6, hematocrit 30.4,   platelets 161.  Differential remarkable for 77.6% grans, 14% lymphs.    Chemistry:  Sodium 138, potassium 4.8, chloride 106, CO2 of 25, BUN 28, creatinine 1.19,   EGFR 57, glucose 158, calcium 9.4.  Alk phos, liver enzymes and LDH within   normal limits.  Magnesium 1.9.  PSA < 0.07.    IMPRESSION:  1.  Metastatic prostate carcinoma involving bone -- clinically stable.  2.  Indeterminate pulmonary nodules, repeat imaging in August.  3.  Anemia in the setting of stage III CKD.  4.  Coronary artery disease.  5.  Peripheral vascular disease -- scheduled for bypass to right lower extremity   on Wednesday, March 29th.  6.  COPD.    PLAN:  1.  We will proceed with the 21st cycle of therapy to consist of abiraterone   1000 mg p.o. daily and prednisone 10 mg p.o. daily on days 1 through 28.  2.  The patient will continue calcium supplementation with vitamin D.  3.  We will hold Procrit 40,000 units as the patient's last injection was on   (March 23rd).  We will have the patient return in one week with interval CBC.  4.  We will proceed with Xgeva 120 mg subQ today (Xgeva given every three months).    Assessment/plan reviewed and approved by Dr. Fernandez.      DEMETRIO/MICKY  dd: 03/28/2017 15:02:48 (CDT)  td: 03/29/2017 03:27:04 (CDT)  Doc ID   #[id number]  Job ID #[id number]    CC:

## 2017-03-30 ENCOUNTER — TELEPHONE (OUTPATIENT)
Dept: HEMATOLOGY/ONCOLOGY | Facility: CLINIC | Age: 82
End: 2017-03-30

## 2017-03-31 PROBLEM — I73.9 PERIPHERAL VASCULAR DISEASE WITH CLAUDICATION: Status: ACTIVE | Noted: 2017-03-31

## 2017-04-03 DIAGNOSIS — R19.7 DIARRHEA, UNSPECIFIED TYPE: ICD-10-CM

## 2017-04-03 DIAGNOSIS — C61 PROSTATE CANCER: ICD-10-CM

## 2017-04-03 DIAGNOSIS — E86.0 DEHYDRATION: ICD-10-CM

## 2017-04-03 DIAGNOSIS — C79.51 BONE METASTASES: ICD-10-CM

## 2017-04-04 RX ORDER — ABIRATERONE ACETATE 250 MG/1
1000 TABLET ORAL DAILY
Qty: 112 TABLET | Refills: 2 | Status: SHIPPED | OUTPATIENT
Start: 2017-04-04 | End: 2017-06-06 | Stop reason: SDUPTHER

## 2017-04-05 ENCOUNTER — TELEPHONE (OUTPATIENT)
Dept: PHARMACY | Facility: CLINIC | Age: 82
End: 2017-04-05

## 2017-04-06 ENCOUNTER — TELEPHONE (OUTPATIENT)
Dept: VASCULAR SURGERY | Facility: CLINIC | Age: 82
End: 2017-04-06

## 2017-04-06 NOTE — TELEPHONE ENCOUNTER
----- Message from Deborah Le MA sent at 4/5/2017 11:25 AM CDT -----  Contact: Cameron      ----- Message -----     From: Neelam Prado     Sent: 4/5/2017  11:18 AM       To: [facility] Staff    Patient is calling to state has taken three of Rx Hydrocodone and it made him sick. He has discontinued taking the medication and asking for a different medication.     [facility] - [facility] [address]  Phone: [phone] Fax: [phone]    Please call [phone]. Thanks!

## 2017-04-07 DIAGNOSIS — C61 PROSTATE CANCER: Primary | ICD-10-CM

## 2017-04-07 NOTE — PROGRESS NOTES
Review of last conversation with CM, will add full set of labs with next appointment.  Orders linked to lab.

## 2017-04-10 ENCOUNTER — NURSE TRIAGE (OUTPATIENT)
Dept: ADMINISTRATIVE | Facility: CLINIC | Age: 82
End: 2017-04-10

## 2017-04-10 ENCOUNTER — TELEPHONE (OUTPATIENT)
Dept: VASCULAR SURGERY | Facility: CLINIC | Age: 82
End: 2017-04-10

## 2017-04-10 ENCOUNTER — TELEPHONE (OUTPATIENT)
Dept: PHARMACY | Facility: CLINIC | Age: 82
End: 2017-04-10

## 2017-04-10 NOTE — TELEPHONE ENCOUNTER
Reason for Disposition   Taking Coumadin (warfarin) or other strong blood thinner, or known bleeding disorder (e.g., thrombocytopenia)    Protocols used: ST URINE - BLOOD IN-A-OH  Mr. Bridges states his urine was dark red this morning. After drinking fluids he noticed urine is beginning to lighten up. Patient states he was told that dark urine was a side effect of one of his medications. Patient would like to know if he should be seen earlier than Thursday or if this is a concern because is on Plavix.

## 2017-04-10 NOTE — TELEPHONE ENCOUNTER
Pt c/o blood in urine since last night. No other sx at present time. Pt is S/p L. Fem-pop surgery on 3/29/17 with po appt this week in [place] on 4/13/17. Instructed to hold Plavix until eval at appt per Dr. Hartmann. Pt states understanding.

## 2017-04-13 ENCOUNTER — OFFICE VISIT (OUTPATIENT)
Dept: VASCULAR SURGERY | Facility: CLINIC | Age: 82
End: 2017-04-13
Payer: MEDICARE

## 2017-04-13 VITALS
DIASTOLIC BLOOD PRESSURE: 55 MMHG | SYSTOLIC BLOOD PRESSURE: 109 MMHG | WEIGHT: 164.69 LBS | HEART RATE: 94 BPM | BODY MASS INDEX: 23.06 KG/M2 | HEIGHT: 71 IN

## 2017-04-13 DIAGNOSIS — Z95.828 S/P FEMORAL-POPLITEAL BYPASS SURGERY: Primary | ICD-10-CM

## 2017-04-13 PROCEDURE — 99999 PR PBB SHADOW E&M-EST. PATIENT-LVL III: CPT | Mod: PBBFAC,,, | Performed by: THORACIC SURGERY (CARDIOTHORACIC VASCULAR SURGERY)

## 2017-04-13 PROCEDURE — 99024 POSTOP FOLLOW-UP VISIT: CPT | Mod: S$GLB,,, | Performed by: THORACIC SURGERY (CARDIOTHORACIC VASCULAR SURGERY)

## 2017-04-13 RX ORDER — TRAMADOL HYDROCHLORIDE 50 MG/1
50 TABLET ORAL
Refills: 0 | COMMUNITY
Start: 2017-04-05 | End: 2017-08-21 | Stop reason: CLARIF

## 2017-04-13 NOTE — PROGRESS NOTES
OFFICE NOTE    This is an 81-year-old gentleman with severe vascular disease, status post   femoral popliteal bypass graft on the right on the 29th of March.  He returns to   the office today in followup.  He has done well.  He has no major complaints.    Today, he has swelling of the leg and some fullness in the right groin, but   symptoms are not unexpected.  He had some hematuria that seems to largely have   resolved.  He does have a history of prostate cancer.  Medicines are noted.    They are part of the recent EPIC record.  His problem list is reviewed.    On exam, his surgical wounds are clean and dry.  There is no evidence of   infection.  He has edema of the leg, which is not unexpected.  I explained to   the patient, he should wear the support hose during the day and when he is   sitting or standing for any length of time, but otherwise, gradually resume his   activities.  We should get a repeat arterial duplex of the right lower extremity   at approximately two months and based on this, we can make further   recommendations.      RUTHY  dd: 04/13/2017 09:45:01 (CDT)  td: 04/13/2017 20:37:52 (BROCKT)  Doc ID   #[id number]  Job ID #[id number]    CC:

## 2017-04-17 ENCOUNTER — TELEPHONE (OUTPATIENT)
Dept: HEMATOLOGY/ONCOLOGY | Facility: CLINIC | Age: 82
End: 2017-04-17

## 2017-04-17 NOTE — TELEPHONE ENCOUNTER
----- Message from Desirae Freedman sent at 4/17/2017 11:13 AM CDT -----  Contact: [phone]  Patient called stating the appointment to see the doctor is incorrect for the lab appointment and appointment with Dr Fernandez. Patient was seen last month, the patient says the appointments is too early.Thanks!

## 2017-04-21 ENCOUNTER — LAB VISIT (OUTPATIENT)
Dept: LAB | Facility: HOSPITAL | Age: 82
End: 2017-04-21
Attending: INTERNAL MEDICINE
Payer: MEDICARE

## 2017-04-21 DIAGNOSIS — D63.1 ANEMIA, CHRONIC RENAL FAILURE, STAGE 3 (MODERATE): ICD-10-CM

## 2017-04-21 DIAGNOSIS — R91.8 PULMONARY NODULES: ICD-10-CM

## 2017-04-21 DIAGNOSIS — N18.30 ANEMIA, CHRONIC RENAL FAILURE, STAGE 3 (MODERATE): ICD-10-CM

## 2017-04-21 DIAGNOSIS — C61 PROSTATE CANCER: ICD-10-CM

## 2017-04-21 DIAGNOSIS — C79.51 BONE METASTASES: ICD-10-CM

## 2017-04-21 DIAGNOSIS — N18.30 CKD (CHRONIC KIDNEY DISEASE) STAGE 3, GFR 30-59 ML/MIN: ICD-10-CM

## 2017-04-21 LAB
ALBUMIN SERPL BCP-MCNC: 2.8 G/DL
ALP SERPL-CCNC: 48 U/L
ALT SERPL W/O P-5'-P-CCNC: 15 U/L
ANION GAP SERPL CALC-SCNC: 10 MMOL/L
ANISOCYTOSIS BLD QL SMEAR: SLIGHT
AST SERPL-CCNC: 27 U/L
BASOPHILS # BLD AUTO: ABNORMAL K/UL
BASOPHILS NFR BLD: 0 %
BILIRUB SERPL-MCNC: 0.6 MG/DL
BUN SERPL-MCNC: 20 MG/DL
CALCIUM SERPL-MCNC: 8.9 MG/DL
CHLORIDE SERPL-SCNC: 104 MMOL/L
CO2 SERPL-SCNC: 25 MMOL/L
COMPLEXED PSA SERPL-MCNC: 0.12 NG/ML
CREAT SERPL-MCNC: 1.4 MG/DL
DIFFERENTIAL METHOD: ABNORMAL
EOSINOPHIL # BLD AUTO: ABNORMAL K/UL
EOSINOPHIL NFR BLD: 1 %
ERYTHROCYTE [DISTWIDTH] IN BLOOD BY AUTOMATED COUNT: 22.8 %
EST. GFR  (AFRICAN AMERICAN): 54 ML/MIN/1.73 M^2
EST. GFR  (NON AFRICAN AMERICAN): 47 ML/MIN/1.73 M^2
GLUCOSE SERPL-MCNC: 122 MG/DL
HCT VFR BLD AUTO: 23.4 %
HGB BLD-MCNC: 7.4 G/DL
LDH SERPL L TO P-CCNC: 288 U/L
LYMPHOCYTES # BLD AUTO: ABNORMAL K/UL
LYMPHOCYTES NFR BLD: 9 %
MAGNESIUM SERPL-MCNC: 1.9 MG/DL
MCH RBC QN AUTO: 28.8 PG
MCHC RBC AUTO-ENTMCNC: 31.6 %
MCV RBC AUTO: 91 FL
METAMYELOCYTES NFR BLD MANUAL: 1 %
MONOCYTES # BLD AUTO: ABNORMAL K/UL
MONOCYTES NFR BLD: 4 %
NEUTROPHILS # BLD AUTO: ABNORMAL K/UL
NEUTROPHILS NFR BLD: 78 %
NEUTS BAND NFR BLD MANUAL: 7 %
PLATELET # BLD AUTO: 176 K/UL
PLATELET BLD QL SMEAR: ABNORMAL
PMV BLD AUTO: 11.2 FL
POIKILOCYTOSIS BLD QL SMEAR: SLIGHT
POTASSIUM SERPL-SCNC: 4.5 MMOL/L
PROT SERPL-MCNC: 5.4 G/DL
RBC # BLD AUTO: 2.57 M/UL
SODIUM SERPL-SCNC: 139 MMOL/L
WBC # BLD AUTO: 5.58 K/UL

## 2017-04-21 PROCEDURE — 85007 BL SMEAR W/DIFF WBC COUNT: CPT | Mod: PO

## 2017-04-21 PROCEDURE — 83615 LACTATE (LD) (LDH) ENZYME: CPT | Mod: PO

## 2017-04-21 PROCEDURE — 80053 COMPREHEN METABOLIC PANEL: CPT | Mod: PO

## 2017-04-21 PROCEDURE — 36415 COLL VENOUS BLD VENIPUNCTURE: CPT | Mod: PO

## 2017-04-21 PROCEDURE — 84153 ASSAY OF PSA TOTAL: CPT

## 2017-04-21 PROCEDURE — 85027 COMPLETE CBC AUTOMATED: CPT | Mod: PO

## 2017-04-21 PROCEDURE — 83735 ASSAY OF MAGNESIUM: CPT | Mod: PO

## 2017-04-25 ENCOUNTER — DOCUMENTATION ONLY (OUTPATIENT)
Dept: INFUSION THERAPY | Facility: HOSPITAL | Age: 82
End: 2017-04-25

## 2017-04-25 ENCOUNTER — INFUSION (OUTPATIENT)
Dept: INFUSION THERAPY | Facility: HOSPITAL | Age: 82
End: 2017-04-25
Attending: INTERNAL MEDICINE
Payer: MEDICARE

## 2017-04-25 ENCOUNTER — OFFICE VISIT (OUTPATIENT)
Dept: HEMATOLOGY/ONCOLOGY | Facility: CLINIC | Age: 82
End: 2017-04-25
Payer: MEDICARE

## 2017-04-25 VITALS
BODY MASS INDEX: 23.08 KG/M2 | SYSTOLIC BLOOD PRESSURE: 122 MMHG | TEMPERATURE: 98 F | DIASTOLIC BLOOD PRESSURE: 58 MMHG | WEIGHT: 164.88 LBS | RESPIRATION RATE: 18 BRPM | HEIGHT: 71 IN | HEART RATE: 70 BPM

## 2017-04-25 DIAGNOSIS — C79.51 BONE METASTASES: ICD-10-CM

## 2017-04-25 DIAGNOSIS — T45.1X5A ANEMIA ASSOCIATED WITH CHEMOTHERAPY: ICD-10-CM

## 2017-04-25 DIAGNOSIS — C61 PROSTATE CANCER: Primary | ICD-10-CM

## 2017-04-25 DIAGNOSIS — D64.81 ANEMIA ASSOCIATED WITH CHEMOTHERAPY: ICD-10-CM

## 2017-04-25 PROCEDURE — 82270 OCCULT BLOOD FECES: CPT | Mod: S$GLB,,, | Performed by: INTERNAL MEDICINE

## 2017-04-25 PROCEDURE — 1159F MED LIST DOCD IN RCRD: CPT | Mod: S$GLB,,, | Performed by: INTERNAL MEDICINE

## 2017-04-25 PROCEDURE — 99214 OFFICE O/P EST MOD 30 MIN: CPT | Mod: S$GLB,,, | Performed by: INTERNAL MEDICINE

## 2017-04-25 PROCEDURE — 3078F DIAST BP <80 MM HG: CPT | Mod: S$GLB,,, | Performed by: INTERNAL MEDICINE

## 2017-04-25 PROCEDURE — 3074F SYST BP LT 130 MM HG: CPT | Mod: S$GLB,,, | Performed by: INTERNAL MEDICINE

## 2017-04-25 PROCEDURE — 99999 PR PBB SHADOW E&M-EST. PATIENT-LVL III: CPT | Mod: PBBFAC,,, | Performed by: INTERNAL MEDICINE

## 2017-04-25 PROCEDURE — 1125F AMNT PAIN NOTED PAIN PRSNT: CPT | Mod: S$GLB,,, | Performed by: INTERNAL MEDICINE

## 2017-04-25 PROCEDURE — 1160F RVW MEDS BY RX/DR IN RCRD: CPT | Mod: S$GLB,,, | Performed by: INTERNAL MEDICINE

## 2017-04-25 NOTE — MR AVS SNAPSHOT
[facility] Hematology  [facility]  [facility] [address]  Phone: [phone]  Fax: [phone]                  Cameron Bridges   2017 11:40 AM   Office Visit    Description:  Male : 1935   Provider:  Chris Fernandez MD   Department:  [facility]                To Do List           Future Appointments        Provider Department Dept Phone    2017 12:30 PM CHAIR 04, [facility] [phone]    2017 2:30 PM CHAIR 11, [facility] [phone]      Goals (5 Years of Data)     None      [facility] On Call     [facility] On Call Nurse Care Line -  Assistance  Unless otherwise directed by your provider, please contact [facility], our nurse care line that is available for  assistance.     Registered nurses in the [facility] provide: appointment scheduling, clinical advisement, health education, and other advisory services.  Call: [phone] (toll free)               Medications           Message regarding Medications     Verify the changes and/or additions to your medication regime listed below are the same as discussed with your clinician today.  If any of these changes or additions are incorrect, please notify your healthcare provider.             Verify that the below list of medications is an accurate representation of the medications you are currently taking.  If none reported, the list may be blank. If incorrect, please contact your healthcare provider. Carry this list with you in case of emergency.           Current Medications     abiraterone 250 mg Tab Take 4 tablets (1,000 mg total) by mouth once daily.    aspirin (ECOTRIN) 81 MG EC tablet Take 1 tablet by mouth Every 3 (three) days.     atorvastatin (LIPITOR) 40 MG tablet Take 1 tablet by mouth every evening.     bismuth subsalicylate (PEPTO BISMOL) 262 mg/15 mL suspension Take 15 mLs by mouth every 6 (six) hours as  "needed for Indigestion.    calcium carbonate-vit D3-min (CALCIUM-VITAMIN D) 600 mg calcium- 400 unit Tab Take 1 tablet by mouth once daily.     diphenoxylate-atropine 2.5-0.025 mg (LOMOTIL) 2.5-0.025 mg per tablet TAKE 2 TABLETS BY MOUTH FOUR TIMES DAILY AS NEEDED FOR DIARRHEA    fenofibrate (TRIGLIDE) 160 MG tablet Take 160 mg by mouth once daily.      glimepiride (AMARYL) 2 MG tablet Take 2 mg by mouth 2 (two) times daily.     hydrocodone-acetaminophen 5-325mg (NORCO) 5-325 mg per tablet Take 1 tablet by mouth every 4 (four) hours as needed for Pain.    isosorbide mononitrate (IMDUR) 60 MG 24 hr tablet TAKE 1 TABLET BY MOUTH DAILY    lisinopril (PRINIVIL,ZESTRIL) 2.5 MG tablet Take 2.5 mg by mouth once daily.      metoprolol (TOPROL XL) 50 MG 24 hr tablet Take 50 mg by mouth once daily.     Multi-Vitamin tablet Take 1 tablet by mouth once daily.     nitroGLYCERIN (NITROSTAT) 0.4 MG SL tablet Place 1 tablet (0.4 mg total) under the tongue every 5 (five) minutes as needed for Chest pain.    omeprazole (PRILOSEC) 20 MG capsule Take 20 mg by mouth once daily.    ONE TOUCH ULTRA TEST Strp     oxybutynin (DITROPAN) 5 MG Tab TAKE 1 TABLET BY MOUTH EVERY EVENING    predniSONE (DELTASONE) 10 MG tablet TAKE ONE TABLET BY MOUTH EVERY DAY    ropinirole (REQUIP) 4 MG tablet Take 8 mg by mouth nightly.    tamsulosin (FLOMAX) 0.4 mg Cp24 Take 1 capsule by mouth every evening.     terazosin (HYTRIN) 1 MG capsule 1 capsule Twice daily.    tramadol (ULTRAM) 50 mg tablet Take 50 mg by mouth every 4 to 6 hours as needed.           Clinical Reference Information           Your Vitals Were     BP Pulse Temp Resp Height Weight    122/58 70 97.8 °F (36.6 °C) 18 5' 11" (1.803 m) 74.8 kg (164 lb 14.5 oz)    BMI                23 kg/m2          Blood Pressure          Most Recent Value    BP  (!)  122/58      Allergies as of 4/25/2017     No Known Drug Allergies      Immunizations Administered on Date of Encounter - 4/25/2017     None    "   [facility] Sign-Up     Activating your [facility] account is as easy as 1-2-3!     1) Visit [email or web address], select Sign Up Now, enter this activation code and your date of birth, then select Next.  Activation code not generated  Current Patient Portal Status: Account disabled      2) Create a username and password to use when you visit [facility] in the future and select a security question in case you lose your password and select Next.    3) Enter your e-mail address and click Sign Up!    Additional Information  If you have questions, please e-mail [email or web address] or call [phone] to talk to our [facility] staff. Remember, [facility] is NOT to be used for urgent needs. For medical emergencies, dial 911.         Smoking Cessation     If you would like to quit smoking:   You may be eligible for free services if you are a [place] resident and started smoking cigarettes before September 1, 1988.  Call the Smoking Cessation Trust ([facility]) toll free at ([phone] [phone] or [phone].   Call 1-999-QUIT-NOW if you do not meet the above criteria.   Contact us via email: [email or web address]   View our website for more information: [email or web address]        Language Assistance Services     ATTENTION: Language assistance services are available, free of charge. Please call [phone].      ATENCIÓN: Si habla español, tiene a rodriguez disposición servicios gratuitos de asistencia lingüística. Llame al [phone].     CHÚ Ý: N?u b?n nói Ti?ng Vi?t, có các d?ch v? h? tr? ngôn ng? mi?n phí dành cho b?n. G?i s? [phone].         [facility] complies with applicable Federal civil rights laws and does not discriminate on the basis of race, color, national origin, age, disability, or sex.

## 2017-04-25 NOTE — PROGRESS NOTES
HISTORY OF PRESENT ILLNESS:  The patient is an 81-year-old white gentleman well   known to me for prostate carcinoma involving bone as well as indeterminate   pulmonary nodules.  He is scheduled for repeat imaging of these in August of   this year.  The patient presents to clinic today in anticipation of next cycle   of abiraterone/prednisone.  The patient unfortunately remains quite weak and has   had falls at home in which he has struck his left shoulder, which remains sore.    He has also had episodes of hypoglycemia.  He lacks energy to do most of his   usual activity.  No other complaints or pertinent findings on a 14-point review   of system.    PHYSICAL EXAMINATION:  GENERAL:  The patient is a well-developed, elderly, frail-appearing white   gentleman in no acute distress.  VITAL SIGNS:  Weight of 165 pounds (increased by 1/2 pound).  HEENT:  Normocephalic, atraumatic.  Oral mucosa pink and moist.  Lips without   lesions.  Tongue midline.  Oropharynx clear.  Nonicteric sclerae.  NECK:  Supple, no adenopathy.  No carotid bruits, thyromegaly or thyroid nodule.  HEART:  Regular rate and rhythm without murmur, gallop or rub.  LUNGS:  Clear to auscultation bilaterally.  Normal respiratory effort.  ABDOMEN:  Soft, nontender, nondistended with positive normoactive bowel sounds,   no hepatosplenomegaly.  EXTREMITIES:  No cyanosis, clubbing or edema.  Distal pulses are intact.  AXILLAE AND GROIN:  No palpable pathologic lymphadenopathy is appreciated.  SKIN:  Intact/turgor normal.  NEUROLOGIC:  Cranial nerves II-XII grossly intact.  Motor:  Good muscle bulk and   tone.  Strength/sensory 5/5 throughout.  Gait stable.    LABORATORY:  White count 5.7, H and H 7.5 and 24.6, MCV of 95, platelets 149.    Serum iron of 35, TIBC 418.  The remainder of the patient's iron studies are   pending.  Chemistry:  Sodium 139, potassium 4.5, chloride 104, CO2 of 25, BUN   20, creatinine 1.4, glucose 122, calcium 8.9, mag 1.9.  Liver  function tests are   within normal limits.  LDH is 288.  PSA has slightly increased from less than   0.07 to 0.12.    IMPRESSION:  1.  Metastatic prostate carcinoma involving bone.  2.  Indeterminate pulmonary nodules for which a repeat CT imaging is scheduled   in August.  3.  Worsening/symptomatic anemia in the setting of stage III CKD with likely   iron deficiency.  4.  Coronary artery disease.  5.  Peripheral vascular disease.  6.  COPD.    PLAN:  1.  Review pending iron studies and supplement as needed.  2.  Transfusion of 2 units of packed cells with typical premeds and Lasix   between units for palliation of symptomatic anemia.  3.  Proceed with 22nd cycle of therapy to consist of abiraterone 1000 mg p.o.   daily and prednisone 10 mg p.o. daily on days 1 through 28.  4.  Next Xgeva infusion due in two months.  5.  We will hold Procrit pending review of iron studies and possible intravenous   iron replacement.  6.  Return to clinic in four weeks with interval CBC, CMP, LDH, mag, and PSA   prior to appointment.      FADUMO/MICKY  dd: 04/25/2017 13:49:53 (CDT)  td: 04/25/2017 19:09:33 (CDT)  Doc ID   #[id number]  Job ID #[id number]    CC:

## 2017-04-26 DIAGNOSIS — C61 PROSTATE CANCER: Primary | ICD-10-CM

## 2017-05-05 ENCOUNTER — TELEPHONE (OUTPATIENT)
Dept: HEMATOLOGY/ONCOLOGY | Facility: CLINIC | Age: 82
End: 2017-05-05

## 2017-05-05 LAB
CTP QC/QA: YES
FECAL OCCULT BLOOD, POC: NEGATIVE

## 2017-05-05 NOTE — TELEPHONE ENCOUNTER
Received call from patient regarding stool card drop off. Informed patient that card must be returned to our office. Patient voiced understanding and appreciation.

## 2017-05-08 ENCOUNTER — TELEPHONE (OUTPATIENT)
Dept: PHARMACY | Facility: CLINIC | Age: 82
End: 2017-05-08

## 2017-05-08 ENCOUNTER — OFFICE VISIT (OUTPATIENT)
Dept: HEMATOLOGY/ONCOLOGY | Facility: CLINIC | Age: 82
End: 2017-05-08
Payer: MEDICARE

## 2017-05-08 ENCOUNTER — LAB VISIT (OUTPATIENT)
Dept: LAB | Facility: HOSPITAL | Age: 82
End: 2017-05-08
Attending: INTERNAL MEDICINE
Payer: MEDICARE

## 2017-05-08 ENCOUNTER — TELEPHONE (OUTPATIENT)
Dept: UROLOGY | Facility: CLINIC | Age: 82
End: 2017-05-08

## 2017-05-08 VITALS
HEART RATE: 78 BPM | WEIGHT: 158.94 LBS | SYSTOLIC BLOOD PRESSURE: 102 MMHG | DIASTOLIC BLOOD PRESSURE: 53 MMHG | HEIGHT: 71 IN | RESPIRATION RATE: 19 BRPM | TEMPERATURE: 98 F | BODY MASS INDEX: 22.25 KG/M2

## 2017-05-08 DIAGNOSIS — D50.0 ANEMIA DUE TO CHRONIC BLOOD LOSS: ICD-10-CM

## 2017-05-08 DIAGNOSIS — R31.9 BLOOD IN URINE: ICD-10-CM

## 2017-05-08 DIAGNOSIS — R31.9 BLOOD IN URINE: Primary | ICD-10-CM

## 2017-05-08 DIAGNOSIS — R31.9 HEMATURIA: Primary | ICD-10-CM

## 2017-05-08 DIAGNOSIS — C79.51 BONE METASTASIS: ICD-10-CM

## 2017-05-08 DIAGNOSIS — C61 PROSTATE CANCER: ICD-10-CM

## 2017-05-08 LAB
ALBUMIN SERPL BCP-MCNC: 3.2 G/DL
ALP SERPL-CCNC: 59 U/L
ALT SERPL W/O P-5'-P-CCNC: 25 U/L
ANION GAP SERPL CALC-SCNC: 7 MMOL/L
AST SERPL-CCNC: 22 U/L
BACTERIA #/AREA URNS HPF: NORMAL /HPF
BASOPHILS # BLD AUTO: 0.03 K/UL
BASOPHILS NFR BLD: 0.6 %
BILIRUB SERPL-MCNC: 0.6 MG/DL
BILIRUB UR QL STRIP: NEGATIVE
BUN SERPL-MCNC: 27 MG/DL
CALCIUM SERPL-MCNC: 9.2 MG/DL
CHLORIDE SERPL-SCNC: 102 MMOL/L
CLARITY UR: ABNORMAL
CO2 SERPL-SCNC: 26 MMOL/L
COLOR UR: ABNORMAL
CREAT SERPL-MCNC: 1.11 MG/DL
DIFFERENTIAL METHOD: ABNORMAL
EOSINOPHIL # BLD AUTO: 0.1 K/UL
EOSINOPHIL NFR BLD: 1.1 %
ERYTHROCYTE [DISTWIDTH] IN BLOOD BY AUTOMATED COUNT: 20.8 %
EST. GFR  (AFRICAN AMERICAN): >60 ML/MIN/1.73 M^2
EST. GFR  (NON AFRICAN AMERICAN): >60 ML/MIN/1.73 M^2
GLUCOSE SERPL-MCNC: 156 MG/DL
GLUCOSE UR QL STRIP: NEGATIVE
HCT VFR BLD AUTO: 27.5 %
HGB BLD-MCNC: 8.6 G/DL
HGB UR QL STRIP: ABNORMAL
HYALINE CASTS #/AREA URNS LPF: 0 /LPF (ref 0–1)
KETONES UR QL STRIP: NEGATIVE
LEUKOCYTE ESTERASE UR QL STRIP: ABNORMAL
LYMPHOCYTES # BLD AUTO: 1.1 K/UL
LYMPHOCYTES NFR BLD: 20.3 %
MCH RBC QN AUTO: 28.9 PG
MCHC RBC AUTO-ENTMCNC: 31.3 %
MCV RBC AUTO: 92 FL
MICROSCOPIC COMMENT: NORMAL
MONOCYTES # BLD AUTO: 0.4 K/UL
MONOCYTES NFR BLD: 6.7 %
NEUTROPHILS # BLD AUTO: 3.7 K/UL
NEUTROPHILS NFR BLD: 71.3 %
NITRITE UR QL STRIP: NEGATIVE
NRBC BLD-RTO: 0 /100 WBC
PH UR STRIP: 6 [PH] (ref 5–8)
PLATELET # BLD AUTO: 146 K/UL
PMV BLD AUTO: 11.6 FL
POTASSIUM SERPL-SCNC: 4.2 MMOL/L
PROT SERPL-MCNC: 5.2 G/DL
PROT UR QL STRIP: ABNORMAL
RBC # BLD AUTO: 2.98 M/UL
RBC #/AREA URNS HPF: NORMAL /HPF (ref 0–4)
SODIUM SERPL-SCNC: 135 MMOL/L
SP GR UR STRIP: 1.01 (ref 1–1.03)
SQUAMOUS #/AREA URNS HPF: NORMAL /HPF
URN SPEC COLLECT METH UR: ABNORMAL
UROBILINOGEN UR STRIP-ACNC: NEGATIVE EU/DL
WBC # BLD AUTO: 5.23 K/UL
WBC #/AREA URNS HPF: NORMAL /HPF (ref 0–5)

## 2017-05-08 PROCEDURE — 1159F MED LIST DOCD IN RCRD: CPT | Mod: S$GLB,,, | Performed by: NURSE PRACTITIONER

## 2017-05-08 PROCEDURE — 3074F SYST BP LT 130 MM HG: CPT | Mod: S$GLB,,, | Performed by: NURSE PRACTITIONER

## 2017-05-08 PROCEDURE — 80053 COMPREHEN METABOLIC PANEL: CPT | Mod: PN

## 2017-05-08 PROCEDURE — 1160F RVW MEDS BY RX/DR IN RCRD: CPT | Mod: S$GLB,,, | Performed by: NURSE PRACTITIONER

## 2017-05-08 PROCEDURE — 87086 URINE CULTURE/COLONY COUNT: CPT

## 2017-05-08 PROCEDURE — 80053 COMPREHEN METABOLIC PANEL: CPT

## 2017-05-08 PROCEDURE — 85025 COMPLETE CBC W/AUTO DIFF WBC: CPT | Mod: PN

## 2017-05-08 PROCEDURE — 3078F DIAST BP <80 MM HG: CPT | Mod: S$GLB,,, | Performed by: NURSE PRACTITIONER

## 2017-05-08 PROCEDURE — 81001 URINALYSIS AUTO W/SCOPE: CPT | Mod: PN

## 2017-05-08 PROCEDURE — 1157F ADVNC CARE PLAN IN RCRD: CPT | Mod: S$GLB,,, | Performed by: NURSE PRACTITIONER

## 2017-05-08 PROCEDURE — 99214 OFFICE O/P EST MOD 30 MIN: CPT | Mod: S$GLB,,, | Performed by: NURSE PRACTITIONER

## 2017-05-08 PROCEDURE — 81001 URINALYSIS AUTO W/SCOPE: CPT

## 2017-05-08 PROCEDURE — 99999 PR PBB SHADOW E&M-EST. PATIENT-LVL IV: CPT | Mod: PBBFAC,,, | Performed by: NURSE PRACTITIONER

## 2017-05-08 PROCEDURE — 1126F AMNT PAIN NOTED NONE PRSNT: CPT | Mod: S$GLB,,, | Performed by: NURSE PRACTITIONER

## 2017-05-08 PROCEDURE — 85025 COMPLETE CBC W/AUTO DIFF WBC: CPT

## 2017-05-08 PROCEDURE — 87086 URINE CULTURE/COLONY COUNT: CPT | Mod: PN

## 2017-05-08 PROCEDURE — 36415 COLL VENOUS BLD VENIPUNCTURE: CPT | Mod: PN

## 2017-05-08 RX ORDER — PREDNISONE 10 MG/1
TABLET ORAL
Qty: 30 TABLET | Refills: 0 | Status: SHIPPED | OUTPATIENT
Start: 2017-05-08 | End: 2017-06-06 | Stop reason: SDUPTHER

## 2017-05-08 RX ORDER — CLOPIDOGREL BISULFATE 75 MG/1
75 TABLET ORAL DAILY
Refills: 3 | Status: ON HOLD | COMMUNITY
Start: 2017-04-17 | End: 2017-08-28 | Stop reason: HOSPADM

## 2017-05-08 NOTE — MR AVS SNAPSHOT
[place] [address]  [facility] [address]  Phone: [phone]  Fax: [phone]                  Cameron Bridges   2017 10:00 AM   Office Visit    Description:  Male : 1935   Provider:  Fransico Davidson NP   Department:  [facility]                To Do List           Future Appointments        Provider Department Dept Phone    2017 12:15 PM LAB, [facility]RAW [facility] Laboratory [phone]    2017 11:15 AM LAB, [facility] Laboratory [phone]    2017 1:00 PM LAB, [email or web address] Laboratory [phone]    2017 2:00 PM Chris Fernandez MD [facility] [phone]    2017 2:30 PM CHAIR 11, [facility] [phone]      Goals (5 Years of Data)     None      [facility] On [facility]     [facility] Nurse Care Line -  Assistance  Unless otherwise directed by your provider, please contact [facility], our nurse care line that is available for  assistance.     Registered nurses in the [facility] provide: appointment scheduling, clinical advisement, health education, and other advisory services.  Call: [phone] (toll free)               Medications           Message regarding Medications     Verify the changes and/or additions to your medication regime listed below are the same as discussed with your clinician today.  If any of these changes or additions are incorrect, please notify your healthcare provider.             Verify that the below list of medications is an accurate representation of the medications you are currently taking.  If none reported, the list may be blank. If incorrect, please contact your healthcare provider. Carry this list with you in case of emergency.           Current Medications     abiraterone 250 mg Tab Take 4 tablets (1,000 mg total) by mouth once daily.    aspirin (ECOTRIN) 81 MG  EC tablet Take 1 tablet by mouth Every 3 (three) days.     atorvastatin (LIPITOR) 40 MG tablet Take 1 tablet by mouth every evening.     bismuth subsalicylate (PEPTO BISMOL) 262 mg/15 mL suspension Take 15 mLs by mouth every 6 (six) hours as needed for Indigestion.    calcium carbonate-vit D3-min (CALCIUM-VITAMIN D) 600 mg calcium- 400 unit Tab Take 1 tablet by mouth once daily.     clopidogrel (PLAVIX) 75 mg tablet Take 75 mg by mouth once daily.    diphenoxylate-atropine 2.5-0.025 mg (LOMOTIL) 2.5-0.025 mg per tablet TAKE 2 TABLETS BY MOUTH FOUR TIMES DAILY AS NEEDED FOR DIARRHEA    fenofibrate (TRIGLIDE) 160 MG tablet Take 160 mg by mouth once daily.      glimepiride (AMARYL) 2 MG tablet Take 2 mg by mouth 2 (two) times daily.     hydrocodone-acetaminophen 5-325mg (NORCO) 5-325 mg per tablet Take 1 tablet by mouth every 4 (four) hours as needed for Pain.    isosorbide mononitrate (IMDUR) 60 MG 24 hr tablet TAKE 1 TABLET BY MOUTH DAILY    lisinopril (PRINIVIL,ZESTRIL) 2.5 MG tablet Take 2.5 mg by mouth once daily.      metoprolol (TOPROL XL) 50 MG 24 hr tablet Take 50 mg by mouth once daily.     Multi-Vitamin tablet Take 1 tablet by mouth once daily.     nitroGLYCERIN (NITROSTAT) 0.4 MG SL tablet Place 1 tablet (0.4 mg total) under the tongue every 5 (five) minutes as needed for Chest pain.    omeprazole (PRILOSEC) 20 MG capsule Take 20 mg by mouth once daily.    ONE TOUCH ULTRA TEST Strp     oxybutynin (DITROPAN) 5 MG Tab TAKE 1 TABLET BY MOUTH EVERY EVENING    predniSONE (DELTASONE) 10 MG tablet TAKE ONE TABLET BY MOUTH EVERY DAY    ropinirole (REQUIP) 4 MG tablet Take 8 mg by mouth nightly.    tamsulosin (FLOMAX) 0.4 mg Cp24 Take 1 capsule by mouth every evening.     terazosin (HYTRIN) 1 MG capsule 1 capsule Twice daily.    tramadol (ULTRAM) 50 mg tablet Take 50 mg by mouth every 4 to 6 hours as needed.           Clinical Reference Information           Your Vitals Were     Temp Resp Height Weight BMI    97.9  "°F (36.6 °C) 19 5' 11" (1.803 m) 72.1 kg (158 lb 15.2 oz) 22.17 kg/m2      Allergies as of 5/8/2017     No Known Drug Allergies      Immunizations Administered on Date of Encounter - 5/8/2017     None      [facility] Sign-Up     Activating your [facility] account is as easy as 1-2-3!     1) Visit [email or web address], select Sign Up Now, enter this activation code and your date of birth, then select Next.  Activation code not generated  Current Patient Portal Status: Account disabled      2) Create a username and password to use when you visit [facility] in the future and select a security question in case you lose your password and select Next.    3) Enter your e-mail address and click Sign Up!    Additional Information  If you have questions, please e-mail [email or web address] or call [phone] to talk to our [facility] staff. Remember, [facility] is NOT to be used for urgent needs. For medical emergencies, dial 911.         Smoking Cessation     If you would like to quit smoking:   You may be eligible for free services if you are a [place] resident and started smoking cigarettes before September 1, 1988.  Call the Smoking Cessation Trust ([facility]) toll free at ([phone] [phone] or [phone].   Call 1-800-QUIT-NOW if you do not meet the above criteria.   Contact us via email: [email or web address]   View our website for more information: [email or web address]        Language Assistance Services     ATTENTION: Language assistance services are available, free of charge. Please call [phone].      ATENCIÓN: Si habla español, tiene a rodriguez disposición servicios gratuitos de asistencia lingüística. Llame al [phone].     CHÚ Ý: N?u b?n nói Ti?ng Vi?t, có các d?ch v? h? tr? ngôn ng? mi?n phí dành cho b?n. G?i s? [phone].         [facility] complies with applicable Federal civil rights laws and does not discriminate on the basis of race, color, national origin, age, disability, or sex.   "

## 2017-05-08 NOTE — PROGRESS NOTES
HISTORY OF PRESENT ILLNESS:  The patient is an 81-year-old white gentleman well   known to Dr. Fernandez for prostate carcinoma involving bone as well as indeterminate   pulmonary nodules.  He is managed on Abiraterone/Prednisone (22 Cycles).  He presents   to the clinic today earlier than scheduled (05/24) with a specimen cup of monico bloody urine.  Specimen was sent to lab for testing.  Labs were drawn.  The patient states that he  had a right fem/pop on 03/29/17 by Dr. Hartmann.  One week after this procedure, he reports  having blood present in his urine.  He had 2 more episodes before deciding to bring a specimen  in.  He denies any dysuria, flank or inguinal pain, fevers, chills, abdominal discomfort, etc.   No other complaints or pertinent findings on a 10-point review of system.    PHYSICAL EXAMINATION:  GENERAL:  Well-developed, elderly, frail white gentleman in no acute distress.  Alert & oriented x 3  VITAL SIGNS:  Weight of 158.9 pounds (decrease of 7 pounds/2 weeks).  /53, Pulse 78, Respirations 19, Temperature 97.9  HEENT:  Normocephalic, atraumatic.  Oral mucosa pink and moist.  Lips without   lesions.  Tongue midline.  Oropharynx clear.  Nonicteric sclerae.  NECK:  Supple, no adenopathy.  No carotid bruits, thyromegaly or thyroid nodule.  HEART:  Regular rate and rhythm without murmur, gallop or rub.  LUNGS:  Clear to auscultation bilaterally.  Normal respiratory effort.  ABDOMEN:  Soft, nontender, nondistended with positive normoactive bowel sounds,   no hepatosplenomegaly.  No CVA tenderness  EXTREMITIES:  No cyanosis, clubbing or edema.  Distal pulses are intact.  AXILLAE AND GROIN:  No palpable pathologic lymphadenopathy is appreciated.  SKIN:  Intact/turgor normal.  NEUROLOGIC:  Cranial nerves II-XII grossly intact.  Motor:  Good muscle bulk and   tone.  Strength/sensory 5/5 throughout.  Gait stable.    LABORATORY:    Lab Results   Component Value Date    WBC 5.23 05/08/2017    HGB 8.6 (L)  05/08/2017    HCT 27.5 (L) 05/08/2017    MCV 92 05/08/2017     (L) 05/08/2017   unremarkable differential    CMP  Sodium   Date Value Ref Range Status   05/08/2017 135 (L) 136 - 145 mmol/L Final     Potassium   Date Value Ref Range Status   05/08/2017 4.2 3.5 - 5.1 mmol/L Final     Chloride   Date Value Ref Range Status   05/08/2017 102 95 - 110 mmol/L Final     CO2   Date Value Ref Range Status   05/08/2017 26 22 - 31 mmol/L Final     Glucose   Date Value Ref Range Status   05/08/2017 156 (H) 70 - 110 mg/dL Final     Comment:     The ADA recommends the following guidelines for fasting glucose:  Normal:       less than 100 mg/dL  Prediabetes:  100 mg/dL to 125 mg/dL  Diabetes:     126 mg/dL or higher       BUN, Bld   Date Value Ref Range Status   05/08/2017 27 (H) 9 - 21 mg/dL Final     Creatinine   Date Value Ref Range Status   05/08/2017 1.11 0.50 - 1.40 mg/dL Final     Calcium   Date Value Ref Range Status   05/08/2017 9.2 8.4 - 10.2 mg/dL Final     Total Protein   Date Value Ref Range Status   05/08/2017 5.2 (L) 6.0 - 8.4 g/dL Final     Albumin   Date Value Ref Range Status   05/08/2017 3.2 (L) 3.5 - 5.2 g/dL Final     Total Bilirubin   Date Value Ref Range Status   05/08/2017 0.6 0.2 - 1.3 mg/dL Final     Comment:     For infants and newborns, interpretation of results should be based  on gestational age, weight and in agreement with clinical  observations.  Premature Infant recommended reference ranges:  Up to 24 hours.............<8.0 mg/dL  Up to 48 hours............<12.0 mg/dL  3-5 days..................<15.0 mg/dL  6-29 days.................<15.0 mg/dL       Alkaline Phosphatase   Date Value Ref Range Status   05/08/2017 59 38 - 145 U/L Final     AST   Date Value Ref Range Status   05/08/2017 22 17 - 59 U/L Final     ALT   Date Value Ref Range Status   05/08/2017 25 10 - 44 U/L Final     Anion Gap   Date Value Ref Range Status   05/08/2017 7 (L) 8 - 16 mmol/L Final     eGFR if     Date Value Ref Range Status   05/08/2017 >60 >60 mL/min/1.73 m^2 Final     eGFR if non    Date Value Ref Range Status   05/08/2017 >60 >60 mL/min/1.73 m^2 Final     Comment:     Calculation used to obtain the estimated glomerular filtration  rate (eGFR) is the CKD-EPI equation. Since race is unknown   in our information system, the eGFR values for   -American and Non--American patients are given   for each creatinine result.       URINALYSIS:    Specimen UA  Urine, Clean Catch   Color, UA Yellow, Straw, Beverley Red (A)   Appearance, UA Clear Hazy (A)   pH, UA 5.0 - 8.0 6.0   Specific Gravity, UA 1.005 - 1.030 1.015   Protein, UA Negative 1+ (A)   Comments: Recommend a 24 hour urine protein or a urine   protein/creatinine ratio if globulin induced proteinuria is   clinically suspected.      Glucose, UA Negative Negative   Ketones, UA Negative Negative   Bilirubin (UA) Negative Negative   Occult Blood UA Negative 3+ (A)   Nitrite, UA Negative Negative   Urobilinogen, UA <2.0 EU/dL Negative   Leukocytes, UA Negative Trace (A)          IMPRESSION:  1.  Hematuria  2.  Metastatic prostate carcinoma involving bone.  3.  Indeterminate pulmonary nodules for which a repeat CT imaging is scheduled in August.  4.  Worsening/symptomatic anemia in the setting of stage III CKD with likely iron deficiency.  5.  Coronary artery disease.  6.  Peripheral vascular disease  S/P Right Fem/Pop on 03/29/17 (Eckholdt)  7.  COPD.    PLAN:  1.  Review with Dr. Fernandez, we will arrange for the patient to see Dr. Delaney for possible  Cystoscopy to investigate hematuria.  2.  Follow up in clinic as scheduled with Dr. Orr, 05/24/17.

## 2017-05-09 ENCOUNTER — OFFICE VISIT (OUTPATIENT)
Dept: UROLOGY | Facility: CLINIC | Age: 82
End: 2017-05-09
Payer: MEDICARE

## 2017-05-09 VITALS
WEIGHT: 158.75 LBS | DIASTOLIC BLOOD PRESSURE: 63 MMHG | HEART RATE: 79 BPM | SYSTOLIC BLOOD PRESSURE: 131 MMHG | HEIGHT: 71 IN | BODY MASS INDEX: 22.23 KG/M2

## 2017-05-09 DIAGNOSIS — C61 MALIGNANT NEOPLASM OF PROSTATE: ICD-10-CM

## 2017-05-09 DIAGNOSIS — R31.9 HEMATURIA: Primary | ICD-10-CM

## 2017-05-09 LAB
BILIRUB SERPL-MCNC: ABNORMAL MG/DL
BLOOD URINE, POC: ABNORMAL
COLOR, POC UA: YELLOW
GLUCOSE UR QL STRIP: ABNORMAL
KETONES UR QL STRIP: ABNORMAL
LEUKOCYTE ESTERASE URINE, POC: ABNORMAL
NITRITE, POC UA: ABNORMAL
PH, POC UA: 5
PROTEIN, POC: ABNORMAL
SPECIFIC GRAVITY, POC UA: 1.02
UROBILINOGEN, POC UA: ABNORMAL

## 2017-05-09 PROCEDURE — 1160F RVW MEDS BY RX/DR IN RCRD: CPT | Mod: S$GLB,,, | Performed by: UROLOGY

## 2017-05-09 PROCEDURE — 1159F MED LIST DOCD IN RCRD: CPT | Mod: S$GLB,,, | Performed by: UROLOGY

## 2017-05-09 PROCEDURE — 81002 URINALYSIS NONAUTO W/O SCOPE: CPT | Mod: S$GLB,,, | Performed by: UROLOGY

## 2017-05-09 PROCEDURE — 3075F SYST BP GE 130 - 139MM HG: CPT | Mod: S$GLB,,, | Performed by: UROLOGY

## 2017-05-09 PROCEDURE — 3078F DIAST BP <80 MM HG: CPT | Mod: S$GLB,,, | Performed by: UROLOGY

## 2017-05-09 PROCEDURE — 99999 PR PBB SHADOW E&M-EST. PATIENT-LVL III: CPT | Mod: PBBFAC,,, | Performed by: UROLOGY

## 2017-05-09 PROCEDURE — 99214 OFFICE O/P EST MOD 30 MIN: CPT | Mod: 25,S$GLB,, | Performed by: UROLOGY

## 2017-05-09 PROCEDURE — 1125F AMNT PAIN NOTED PAIN PRSNT: CPT | Mod: S$GLB,,, | Performed by: UROLOGY

## 2017-05-09 NOTE — PROGRESS NOTES
UROLOGY [place]  5 9 17    Urinalysis: col yellow, sg 25, pH 5, leuco -, nitrites -, prot -, glucose -, bili -, blood +    c-c anemia    Age 82, comes in to follow up on his prostate cancer. He says he was told he is markedly anemic and they are still researching the source of his anemia.  Says he is always told he has some blood in his urine and wonders if such is part of the origin of his anemia.    Nocturia x 1-2, no significant daytime frequency, no urgency or incontinence. No pains or burning. Stream is slightly diminished but no need to strain.      Being followed for prostate ca, high grade and spread outside of the gland. Pt has done very well with hormonal treatment. He is having very little side effects of his hormonal suppression shots, but he did have a lot of sweats initially. He has been voiding well. While he is up and moving around he does not have urinary frequency, but says that at night he tends to have some nocturia.     Pt received LHRH agonist as treatment of his prostate cancer starting in 2012, and received one shot every 6 months for three years. After that, he has been in intermittent hormonal therapy.    His psa was 12 on presentation. It was 0.12 last month.    PMH    Surgical:  has a past surgical history that includes Appendectomy; Cholecystectomy; Cystoscopy; Vasectomy; Coronary artery bypass graft (2001); Carotid stent; Coronary stent placement (last 2009); Cardiac surgery; Hernia repair; and Vascular surgery (2010).    Medical:  has a past medical history of Anemia; Arthritis; Cerebrovascular disease; Colon polyp; Complication of anesthesia; COPD (chronic obstructive pulmonary disease); Coronary artery disease; Diabetes mellitus; Elevated PSA; Equilibrium disorder; Hearing loss in left ear; Hyperlipidemia; Hypertension; Hypertrophy (benign) of prostate; Light smoker; Peripheral vascular disease; Peripheral vascular disease with claudication; Prostate cancer; Rectal prolapse;  Rotator cuff tear; and Ulcer.    Familial: no fh of renal disease. Father had a stroke, mother had heart disease    Social: , lives in [place], [place]    Current Outpatient Prescriptions on File Prior to Visit   Medication Sig Dispense Refill    abiraterone 250 mg Tab Take 4 tablets (1,000 mg total) by mouth once daily. 112 tablet 2    aspirin (ECOTRIN) 81 MG EC tablet Take 1 tablet by mouth Every 3 (three) days.       atorvastatin (LIPITOR) 40 MG tablet Take 1 tablet by mouth every evening.       bismuth subsalicylate (PEPTO BISMOL) 262 mg/15 mL suspension Take 15 mLs by mouth every 6 (six) hours as needed for Indigestion.      calcium carbonate-vit D3-min (CALCIUM-VITAMIN D) 600 mg calcium- 400 unit Tab Take 1 tablet by mouth once daily.       clopidogrel (PLAVIX) 75 mg tablet Take 75 mg by mouth once daily.  3    diphenoxylate-atropine 2.5-0.025 mg (LOMOTIL) 2.5-0.025 mg per tablet TAKE 2 TABLETS BY MOUTH FOUR TIMES DAILY AS NEEDED FOR DIARRHEA  2    fenofibrate (TRIGLIDE) 160 MG tablet Take 160 mg by mouth once daily.        glimepiride (AMARYL) 2 MG tablet Take 2 mg by mouth 2 (two) times daily.       hydrocodone-acetaminophen 5-325mg (NORCO) 5-325 mg per tablet Take 1 tablet by mouth every 4 (four) hours as needed for Pain. 40 tablet 0    isosorbide mononitrate (IMDUR) 60 MG 24 hr tablet TAKE 1 TABLET BY MOUTH DAILY 90 tablet 5    lisinopril (PRINIVIL,ZESTRIL) 2.5 MG tablet Take 2.5 mg by mouth once daily.        metoprolol (TOPROL XL) 50 MG 24 hr tablet Take 50 mg by mouth once daily.       Multi-Vitamin tablet Take 1 tablet by mouth once daily.       nitroGLYCERIN (NITROSTAT) 0.4 MG SL tablet Place 1 tablet (0.4 mg total) under the tongue every 5 (five) minutes as needed for Chest pain. 30 tablet 12    omeprazole (PRILOSEC) 20 MG capsule Take 20 mg by mouth once daily.  3    ONE TOUCH ULTRA TEST Strp       oxybutynin (DITROPAN) 5 MG Tab TAKE 1 TABLET BY MOUTH EVERY EVENING 30 tablet 11     predniSONE (DELTASONE) 10 MG tablet TAKE ONE TABLET BY MOUTH EVERY DAY 30 tablet 0    ropinirole (REQUIP) 4 MG tablet Take 8 mg by mouth nightly.  2    tamsulosin (FLOMAX) 0.4 mg Cp24 Take 1 capsule by mouth every evening.       terazosin (HYTRIN) 1 MG capsule 1 capsule Twice daily.      tramadol (ULTRAM) 50 mg tablet Take 50 mg by mouth every 4 to 6 hours as needed.  0     Pt alert, oriented, cooperative, no distress  HEENT: wnl.  Neck: supple, no JVD, no lymphadenopathy  Chest: CV NSR, no murmurs  Lungs: normal auscultation  Abdomen flat, nontender, no organomegaly, no masses.  No hernias  Penis nl, meatus nl  Testes nl, epi nl, scrotum nl  SEE: anus nl, sphincter nl tone, mucosa without lesions, anterior rectal wall soft and nontender. No hard areas  Extremities: no edema, peripheral pulses nl  Neuro: preserved            IMP prostate ca, had malignant involvement of all sextants, of high grade with jenn 9 (5+4) in all of them, with a participation of between 90 and 100 percent of the tissue camples.     Psa on presentation was 12  Metastatic w/u on diagnosis showed innumerable bony sclerotic lesions and also lymphadenopathy in the paraaortic nodes T3c N1 M1.                     Plan: the psa reading is very low and i dont feel that hormonal injection will be useful at this point.  Will recheck the psa in 6 months.  Will check with dr meyer to see if he believes the shot at this time is necessary.

## 2017-05-10 LAB — BACTERIA UR CULT: NO GROWTH

## 2017-05-19 ENCOUNTER — TELEPHONE (OUTPATIENT)
Dept: PHARMACY | Facility: CLINIC | Age: 82
End: 2017-05-19

## 2017-05-24 ENCOUNTER — INFUSION (OUTPATIENT)
Dept: INFUSION THERAPY | Facility: HOSPITAL | Age: 82
End: 2017-05-24
Attending: INTERNAL MEDICINE
Payer: MEDICARE

## 2017-05-24 ENCOUNTER — OFFICE VISIT (OUTPATIENT)
Dept: HEMATOLOGY/ONCOLOGY | Facility: CLINIC | Age: 82
End: 2017-05-24
Payer: MEDICARE

## 2017-05-24 VITALS
BODY MASS INDEX: 22.37 KG/M2 | HEIGHT: 71 IN | RESPIRATION RATE: 16 BRPM | SYSTOLIC BLOOD PRESSURE: 101 MMHG | HEART RATE: 88 BPM | DIASTOLIC BLOOD PRESSURE: 56 MMHG | TEMPERATURE: 98 F | WEIGHT: 159.81 LBS

## 2017-05-24 DIAGNOSIS — C61 PROSTATE CANCER: Primary | ICD-10-CM

## 2017-05-24 DIAGNOSIS — C79.51 BONE METASTASES: ICD-10-CM

## 2017-05-24 DIAGNOSIS — E86.0 DEHYDRATION: ICD-10-CM

## 2017-05-24 DIAGNOSIS — C61 PROSTATE CANCER: ICD-10-CM

## 2017-05-24 DIAGNOSIS — D64.81 ANEMIA DUE TO ANTINEOPLASTIC CHEMOTHERAPY: ICD-10-CM

## 2017-05-24 DIAGNOSIS — C79.51 BONE METASTASIS: ICD-10-CM

## 2017-05-24 DIAGNOSIS — R19.7 DIARRHEA, UNSPECIFIED TYPE: ICD-10-CM

## 2017-05-24 DIAGNOSIS — E86.0 DEHYDRATION: Primary | ICD-10-CM

## 2017-05-24 DIAGNOSIS — T45.1X5A ANEMIA DUE TO ANTINEOPLASTIC CHEMOTHERAPY: ICD-10-CM

## 2017-05-24 DIAGNOSIS — R60.9 EDEMA, UNSPECIFIED TYPE: ICD-10-CM

## 2017-05-24 DIAGNOSIS — D50.9 IRON DEFICIENCY ANEMIA, UNSPECIFIED IRON DEFICIENCY ANEMIA TYPE: ICD-10-CM

## 2017-05-24 PROCEDURE — 99214 OFFICE O/P EST MOD 30 MIN: CPT | Mod: S$GLB,,, | Performed by: NURSE PRACTITIONER

## 2017-05-24 PROCEDURE — 3078F DIAST BP <80 MM HG: CPT | Mod: S$GLB,,, | Performed by: NURSE PRACTITIONER

## 2017-05-24 PROCEDURE — 3074F SYST BP LT 130 MM HG: CPT | Mod: S$GLB,,, | Performed by: NURSE PRACTITIONER

## 2017-05-24 PROCEDURE — 1125F AMNT PAIN NOTED PAIN PRSNT: CPT | Mod: S$GLB,,, | Performed by: NURSE PRACTITIONER

## 2017-05-24 PROCEDURE — 1159F MED LIST DOCD IN RCRD: CPT | Mod: S$GLB,,, | Performed by: NURSE PRACTITIONER

## 2017-05-24 PROCEDURE — 96372 THER/PROPH/DIAG INJ SC/IM: CPT | Mod: PN

## 2017-05-24 PROCEDURE — 1160F RVW MEDS BY RX/DR IN RCRD: CPT | Mod: S$GLB,,, | Performed by: NURSE PRACTITIONER

## 2017-05-24 PROCEDURE — 99999 PR PBB SHADOW E&M-EST. PATIENT-LVL V: CPT | Mod: PBBFAC,,, | Performed by: NURSE PRACTITIONER

## 2017-05-24 PROCEDURE — 63600175 PHARM REV CODE 636 W HCPCS: Mod: PN | Performed by: NURSE PRACTITIONER

## 2017-05-24 RX ORDER — SODIUM CHLORIDE 0.9 % (FLUSH) 0.9 %
10 SYRINGE (ML) INJECTION
Status: CANCELLED | OUTPATIENT
Start: 2017-05-31

## 2017-05-24 RX ORDER — SPIRONOLACTONE 25 MG/1
25 TABLET ORAL DAILY
Qty: 60 TABLET | Refills: 11 | Status: SHIPPED | OUTPATIENT
Start: 2017-05-24 | End: 2017-08-21 | Stop reason: CLARIF

## 2017-05-24 RX ORDER — HEPARIN 100 UNIT/ML
500 SYRINGE INTRAVENOUS
Status: CANCELLED | OUTPATIENT
Start: 2017-05-31

## 2017-05-24 RX ORDER — DIPHENHYDRAMINE HYDROCHLORIDE 50 MG/ML
50 INJECTION INTRAMUSCULAR; INTRAVENOUS
Status: CANCELLED
Start: 2017-05-31

## 2017-05-24 RX ORDER — FAMOTIDINE 20 MG/50ML
20 INJECTION, SOLUTION INTRAVENOUS
Status: CANCELLED
Start: 2017-05-31

## 2017-05-24 RX ADMIN — ERYTHROPOIETIN 40000 UNITS: 40000 INJECTION, SOLUTION INTRAVENOUS; SUBCUTANEOUS at 12:05

## 2017-05-24 NOTE — PLAN OF CARE
Problem: Patient Care Overview  Goal: Plan of Care Review  Outcome: Ongoing (interventions implemented as appropriate)  Pt tolerated injection well without complaints. D/C to home with steady gait.

## 2017-05-24 NOTE — PROGRESS NOTES
HISTORY OF PRESENT ILLNESS:  The patient is an 81-year-old white gentleman well   known to Dr. Fernandez for prostate carcinoma involving bone as well as indeterminate   pulmonary nodules.  He is managed on Abiraterone/Prednisone (21 Cycles).  He presents   to the clinic today for evaluation prior to next cycle of treatment. To note:  The patient underwent  a right fem/pop on 03/29/17 by Dr. Hartmann.  One week after this procedure, he reported   blood in his urine.  He was evaluated by Dr. Delaney on 05/09/17.  He denies any additional   episodes of hematuria.  He denies any fevers, chills, drenching night sweats, unexplained weight loss,  abdominal discomfort/bloating, nausea, vomiting, diarrhea, melena, bleeding, etc.   No other complaints or pertinent findings on a 14-point review of system.    PHYSICAL EXAMINATION:  GENERAL:  Well-developed, elderly, frail white gentleman in no acute distress.  Alert & oriented x 3  VITAL SIGNS:  Weight of 159.8 pounds (gain of 1 pound/2 1/2 weeks).  /56, Pulse 88, Respirations 16, Temperature 98.4  HEENT:  Normocephalic, atraumatic.  Oral mucosa pink and moist.  Lips without   lesions.  Tongue midline.  Oropharynx clear.  Nonicteric sclerae.  NECK:  Supple, no adenopathy.  No carotid bruits, thyromegaly or thyroid nodule.  HEART:  Regular rate and rhythm without murmur, gallop or rub.  LUNGS:  Clear to auscultation bilaterally.  Normal respiratory effort.  ABDOMEN:  Soft, nontender, nondistended with positive normoactive bowel sounds,   no hepatosplenomegaly.  No CVA tenderness  EXTREMITIES:  No cyanosis, clubbing or edema.  Distal pulses are intact.  AXILLAE AND GROIN:  No palpable pathologic lymphadenopathy is appreciated.  SKIN:  Intact/turgor normal.  NEUROLOGIC:  Cranial nerves II-XII grossly intact.  Motor:  Good muscle bulk and   tone.  Strength/sensory 5/5 throughout.  Gait stable.    LABORATORY:    Lab Results   Component Value Date    WBC 3.73 (L) 05/24/2017     HGB 9.0 (L) 05/24/2017    HCT 28.2 (L) 05/24/2017    MCV 93 05/24/2017     (L) 05/24/2017   Diff remarkable for 74.3%    CMP  Sodium   Date Value Ref Range Status   05/24/2017 138 136 - 145 mmol/L Final     Potassium   Date Value Ref Range Status   05/24/2017 4.0 3.5 - 5.1 mmol/L Final     Chloride   Date Value Ref Range Status   05/24/2017 105 95 - 110 mmol/L Final     CO2   Date Value Ref Range Status   05/24/2017 28 22 - 31 mmol/L Final     Glucose   Date Value Ref Range Status   05/24/2017 199 (H) 70 - 110 mg/dL Final     Comment:     The ADA recommends the following guidelines for fasting glucose:  Normal:       less than 100 mg/dL  Prediabetes:  100 mg/dL to 125 mg/dL  Diabetes:     126 mg/dL or higher       BUN, Bld   Date Value Ref Range Status   05/24/2017 19 9 - 21 mg/dL Final     Creatinine   Date Value Ref Range Status   05/24/2017 1.02 0.50 - 1.40 mg/dL Final     Calcium   Date Value Ref Range Status   05/24/2017 8.8 8.4 - 10.2 mg/dL Final     Total Protein   Date Value Ref Range Status   05/24/2017 5.3 (L) 6.0 - 8.4 g/dL Final     Albumin   Date Value Ref Range Status   05/24/2017 3.1 (L) 3.5 - 5.2 g/dL Final     Total Bilirubin   Date Value Ref Range Status   05/24/2017 0.5 0.2 - 1.3 mg/dL Final     Comment:     For infants and newborns, interpretation of results should be based  on gestational age, weight and in agreement with clinical  observations.  Premature Infant recommended reference ranges:  Up to 24 hours.............<8.0 mg/dL  Up to 48 hours............<12.0 mg/dL  3-5 days..................<15.0 mg/dL  6-29 days.................<15.0 mg/dL       Alkaline Phosphatase   Date Value Ref Range Status   05/24/2017 69 38 - 145 U/L Final     AST   Date Value Ref Range Status   05/24/2017 23 17 - 59 U/L Final     ALT   Date Value Ref Range Status   05/24/2017 31 10 - 44 U/L Final     Anion Gap   Date Value Ref Range Status   05/24/2017 5 (L) 8 - 16 mmol/L Final     eGFR if     Date Value Ref Range Status   05/24/2017 >60 >60 mL/min/1.73 m^2 Final     eGFR if non    Date Value Ref Range Status   05/24/2017 >60 >60 mL/min/1.73 m^2 Final     Comment:     Calculation used to obtain the estimated glomerular filtration  rate (eGFR) is the CKD-EPI equation. Since race is unknown   in our information system, the eGFR values for   -American and Non--American patients are given   for each creatinine result.       , Magnesium 1.9, PSA < 0.07    IMPRESSION:  1.  Metastatic prostate carcinoma involving bone.  2.  Indeterminate pulmonary nodules for which a repeat CT imaging is scheduled in August.  3.  Worsening/symptomatic anemia in the setting of stage III CKD with likely iron deficiency - Feraheme ordered/scheduled  4.  Coronary artery disease.  5.  Peripheral vascular disease  S/P Right Fem/Pop on 03/29/17 ([facility])  6.  COPD.  7.  Hematuria - resolved    PLAN:  1.  Procrit 40,000 units SQ today & next week; recheck CBC in two weeks for possible additional Procrit.  2.  Schedule Feraheme x 2.  3.  Spironolactone 25 - 50 mg/day prn edema; Rx sent to [facility].  4.  F/U in clinic in 4 weeks prior to next cycle with CBC, CMP, LDH, MG, PSA prior.    Assessment/plan reviewed and approved by Dr. Fernandez.

## 2017-05-26 ENCOUNTER — INFUSION (OUTPATIENT)
Dept: INFUSION THERAPY | Facility: HOSPITAL | Age: 82
End: 2017-05-26
Attending: INTERNAL MEDICINE
Payer: MEDICARE

## 2017-05-26 VITALS
RESPIRATION RATE: 20 BRPM | TEMPERATURE: 98 F | HEART RATE: 67 BPM | DIASTOLIC BLOOD PRESSURE: 61 MMHG | SYSTOLIC BLOOD PRESSURE: 141 MMHG

## 2017-05-26 DIAGNOSIS — C79.51 BONY METASTASIS: ICD-10-CM

## 2017-05-26 DIAGNOSIS — D50.9 IRON DEFICIENCY ANEMIA, UNSPECIFIED IRON DEFICIENCY ANEMIA TYPE: Primary | ICD-10-CM

## 2017-05-26 PROCEDURE — 25000003 PHARM REV CODE 250: Mod: PN | Performed by: NURSE PRACTITIONER

## 2017-05-26 PROCEDURE — 63600175 PHARM REV CODE 636 W HCPCS: Mod: PN | Performed by: NURSE PRACTITIONER

## 2017-05-26 PROCEDURE — 96367 TX/PROPH/DG ADDL SEQ IV INF: CPT | Mod: PN

## 2017-05-26 PROCEDURE — 96365 THER/PROPH/DIAG IV INF INIT: CPT | Mod: PN

## 2017-05-26 RX ORDER — FAMOTIDINE 20 MG/50ML
20 INJECTION, SOLUTION INTRAVENOUS
Status: CANCELLED
Start: 2017-05-31

## 2017-05-26 RX ORDER — HEPARIN 100 UNIT/ML
500 SYRINGE INTRAVENOUS
Status: CANCELLED | OUTPATIENT
Start: 2017-05-31

## 2017-05-26 RX ORDER — SODIUM CHLORIDE 0.9 % (FLUSH) 0.9 %
10 SYRINGE (ML) INJECTION
Status: CANCELLED | OUTPATIENT
Start: 2017-05-31

## 2017-05-26 RX ORDER — DIPHENHYDRAMINE HYDROCHLORIDE 50 MG/ML
50 INJECTION INTRAMUSCULAR; INTRAVENOUS
Status: CANCELLED
Start: 2017-05-31

## 2017-05-26 RX ORDER — FAMOTIDINE 20 MG/50ML
20 INJECTION, SOLUTION INTRAVENOUS
Status: COMPLETED | OUTPATIENT
Start: 2017-05-26 | End: 2017-05-26

## 2017-05-26 RX ADMIN — FAMOTIDINE 20 MG: 20 INJECTION, SOLUTION INTRAVENOUS at 09:05

## 2017-05-26 RX ADMIN — SODIUM CHLORIDE: 0.9 INJECTION, SOLUTION INTRAVENOUS at 09:05

## 2017-05-26 RX ADMIN — FERUMOXYTOL 510 MG: 510 INJECTION INTRAVENOUS at 10:05

## 2017-05-26 RX ADMIN — SODIUM CHLORIDE 4 MG: 9 INJECTION, SOLUTION INTRAVENOUS at 09:05

## 2017-05-26 RX ADMIN — DIPHENHYDRAMINE HYDROCHLORIDE 50 MG: 50 INJECTION, SOLUTION INTRAMUSCULAR; INTRAVENOUS at 09:05

## 2017-05-26 NOTE — PLAN OF CARE
Problem: Patient Care Overview  Goal: Plan of Care Review  Pt tolerated 1st Feraheme well.  Pt stayed for observation for 30 minutes after completion of IV iron.  No s/s of infusion reaction.  Instructed to call MD with any problems.  To RTC on Monday for second dose of Feraheme.

## 2017-05-26 NOTE — PLAN OF CARE
Problem: Activity Intolerance (Adult)  Goal: Activity Tolerance  Patient will demonstrate the desired outcomes by discharge/transition of care.   Outcome: Ongoing (interventions implemented as appropriate)  Pt states he tires easily.  Must rest in between chores.

## 2017-05-29 ENCOUNTER — OFFICE VISIT (OUTPATIENT)
Dept: CARDIOLOGY | Facility: CLINIC | Age: 82
End: 2017-05-29
Payer: MEDICARE

## 2017-05-29 VITALS
DIASTOLIC BLOOD PRESSURE: 64 MMHG | HEART RATE: 76 BPM | BODY MASS INDEX: 22.5 KG/M2 | HEIGHT: 71 IN | SYSTOLIC BLOOD PRESSURE: 116 MMHG | WEIGHT: 160.69 LBS

## 2017-05-29 DIAGNOSIS — I25.119 CORONARY ARTERY DISEASE INVOLVING NATIVE CORONARY ARTERY OF NATIVE HEART WITH ANGINA PECTORIS: ICD-10-CM

## 2017-05-29 DIAGNOSIS — Z95.5 STATUS POST CORONARY ARTERY STENT PLACEMENT: Chronic | ICD-10-CM

## 2017-05-29 DIAGNOSIS — C61 PROSTATE CANCER: ICD-10-CM

## 2017-05-29 DIAGNOSIS — T45.1X5A ANEMIA DUE TO ANTINEOPLASTIC CHEMOTHERAPY: ICD-10-CM

## 2017-05-29 DIAGNOSIS — D64.81 ANEMIA DUE TO ANTINEOPLASTIC CHEMOTHERAPY: ICD-10-CM

## 2017-05-29 DIAGNOSIS — Z95.1 S/P CABG (CORONARY ARTERY BYPASS GRAFT): ICD-10-CM

## 2017-05-29 DIAGNOSIS — I73.9 PERIPHERAL VASCULAR DISEASE, UNSPECIFIED: ICD-10-CM

## 2017-05-29 PROCEDURE — 99214 OFFICE O/P EST MOD 30 MIN: CPT | Mod: S$GLB,,, | Performed by: INTERNAL MEDICINE

## 2017-05-29 PROCEDURE — 1126F AMNT PAIN NOTED NONE PRSNT: CPT | Mod: S$GLB,,, | Performed by: INTERNAL MEDICINE

## 2017-05-29 PROCEDURE — 1159F MED LIST DOCD IN RCRD: CPT | Mod: S$GLB,,, | Performed by: INTERNAL MEDICINE

## 2017-05-29 PROCEDURE — 99999 PR PBB SHADOW E&M-EST. PATIENT-LVL III: CPT | Mod: PBBFAC,,, | Performed by: INTERNAL MEDICINE

## 2017-05-29 NOTE — PROGRESS NOTES
Subjective:    Patient ID:  Cameron Bridges is a 81 y.o. male who presents for follow-up of Coronary Artery Disease (6 month follow up); Hyperlipidemia; and Hypertension      Pt with CAD has had recent R fem-pop bypass with Dr. Hartmann. He is concerned about 2 recent episodes of burning in his chest similar to previous angina both relieved in 5 minutes with SL NTG.         Review of Systems   Constitution: Negative for weight gain and weight loss.   HENT: Negative.    Eyes: Negative.    Cardiovascular: Positive for chest pain. Negative for claudication, cyanosis, dyspnea on exertion, irregular heartbeat, leg swelling, near-syncope, orthopnea (no PND), palpitations and syncope.   Respiratory: Negative for cough, hemoptysis, shortness of breath and snoring.    Endocrine: Negative.    Skin: Negative.    Musculoskeletal: Negative for joint pain, muscle cramps, muscle weakness and myalgias.   Gastrointestinal: Negative for diarrhea, hematemesis, nausea and vomiting.   Genitourinary: Negative.    Neurological: Negative for dizziness, focal weakness, light-headedness, loss of balance, numbness, paresthesias and seizures.   Psychiatric/Behavioral: Negative.         Objective:    Physical Exam   Constitutional: He is oriented to person, place, and time. He appears well-developed and well-nourished.   HENT:   Mouth/Throat: Oropharynx is clear and moist.   Eyes: Pupils are equal, round, and reactive to light.   Neck: Normal range of motion. Carotid bruit is present (soft R bruit). No thyromegaly present.   Cardiovascular: Normal rate, regular rhythm, S1 normal, S2 normal and normal heart sounds.   No extrasystoles are present. PMI is not displaced.  Exam reveals no friction rub.    No murmur heard.  Pulmonary/Chest: Effort normal and breath sounds normal. He has no wheezes. He has no rales. He exhibits no tenderness.   Abdominal: Soft. Bowel sounds are normal. He exhibits no distension and no mass. There is no tenderness.    Musculoskeletal: Normal range of motion. He exhibits no edema.   Neurological: He is alert and oriented to person, place, and time.   Skin: Skin is warm and dry.   Vitals reviewed.        Assessment:       1. Coronary artery disease involving native coronary artery of native heart with angina pectoris    2. Peripheral vascular disease, unspecified    3. Prostate cancer    4. S/P CABG (coronary artery bypass graft) - x4 2000; LIMA to LAD (patent 2009); SVG to Ramus (occluded 2009); SVG to OMB (stent 2009); SVG to PDA (occluded 2009)    5. Status post coronary artery stent placement - rotablator, stent to [facility] 01/06/2015    6. Anemia due to antineoplastic chemotherapy         Plan:       We discussed his symptoms and possibility it is a return of angina. We discussed repeat cath vs SPECT stress.  He is reluctant to have another procedure at this time and wants to see if SPECT stress suggests he needs it.   SPECT stress and if positive will need repeat angiogram

## 2017-05-31 ENCOUNTER — INFUSION (OUTPATIENT)
Dept: INFUSION THERAPY | Facility: HOSPITAL | Age: 82
End: 2017-05-31
Attending: INTERNAL MEDICINE
Payer: MEDICARE

## 2017-05-31 VITALS
RESPIRATION RATE: 16 BRPM | TEMPERATURE: 99 F | HEART RATE: 77 BPM | DIASTOLIC BLOOD PRESSURE: 61 MMHG | SYSTOLIC BLOOD PRESSURE: 133 MMHG

## 2017-05-31 DIAGNOSIS — C79.51 BONE METASTASES: ICD-10-CM

## 2017-05-31 DIAGNOSIS — C61 PROSTATE CANCER: ICD-10-CM

## 2017-05-31 DIAGNOSIS — C79.51 BONY METASTASIS: ICD-10-CM

## 2017-05-31 DIAGNOSIS — E86.0 DEHYDRATION: Primary | ICD-10-CM

## 2017-05-31 DIAGNOSIS — R19.7 DIARRHEA, UNSPECIFIED TYPE: ICD-10-CM

## 2017-05-31 DIAGNOSIS — D50.9 IRON DEFICIENCY ANEMIA, UNSPECIFIED IRON DEFICIENCY ANEMIA TYPE: ICD-10-CM

## 2017-05-31 PROCEDURE — 63600175 PHARM REV CODE 636 W HCPCS: Mod: PN | Performed by: INTERNAL MEDICINE

## 2017-05-31 PROCEDURE — 25000003 PHARM REV CODE 250: Mod: PN | Performed by: NURSE PRACTITIONER

## 2017-05-31 PROCEDURE — 96365 THER/PROPH/DIAG IV INF INIT: CPT | Mod: PN

## 2017-05-31 PROCEDURE — 96367 TX/PROPH/DG ADDL SEQ IV INF: CPT | Mod: PN

## 2017-05-31 PROCEDURE — 63600175 PHARM REV CODE 636 W HCPCS: Mod: PN | Performed by: NURSE PRACTITIONER

## 2017-05-31 PROCEDURE — 96375 TX/PRO/DX INJ NEW DRUG ADDON: CPT | Mod: PN

## 2017-05-31 RX ORDER — FAMOTIDINE 20 MG/50ML
20 INJECTION, SOLUTION INTRAVENOUS
Status: COMPLETED | OUTPATIENT
Start: 2017-05-31 | End: 2017-05-31

## 2017-05-31 RX ORDER — DIPHENHYDRAMINE HYDROCHLORIDE 50 MG/ML
50 INJECTION INTRAMUSCULAR; INTRAVENOUS
Status: CANCELLED
Start: 2017-05-31

## 2017-05-31 RX ORDER — DEXAMETHASONE SODIUM PHOSPHATE 4 MG/ML
4 INJECTION, SOLUTION INTRA-ARTICULAR; INTRALESIONAL; INTRAMUSCULAR; INTRAVENOUS; SOFT TISSUE
Status: COMPLETED | OUTPATIENT
Start: 2017-05-31 | End: 2017-05-31

## 2017-05-31 RX ORDER — SODIUM CHLORIDE 0.9 % (FLUSH) 0.9 %
10 SYRINGE (ML) INJECTION
Status: CANCELLED | OUTPATIENT
Start: 2017-05-31

## 2017-05-31 RX ORDER — HEPARIN 100 UNIT/ML
500 SYRINGE INTRAVENOUS
Status: CANCELLED | OUTPATIENT
Start: 2017-05-31

## 2017-05-31 RX ORDER — FAMOTIDINE 20 MG/50ML
20 INJECTION, SOLUTION INTRAVENOUS
Status: CANCELLED
Start: 2017-05-31

## 2017-05-31 RX ORDER — SODIUM CHLORIDE 0.9 % (FLUSH) 0.9 %
10 SYRINGE (ML) INJECTION
Status: DISCONTINUED | OUTPATIENT
Start: 2017-05-31 | End: 2017-05-31 | Stop reason: HOSPADM

## 2017-05-31 RX ADMIN — FAMOTIDINE 20 MG: 20 INJECTION, SOLUTION INTRAVENOUS at 12:05

## 2017-05-31 RX ADMIN — DIPHENHYDRAMINE HYDROCHLORIDE 50 MG: 50 INJECTION, SOLUTION INTRAMUSCULAR; INTRAVENOUS at 11:05

## 2017-05-31 RX ADMIN — DEXAMETHASONE SODIUM PHOSPHATE 4 MG: 4 INJECTION, SOLUTION INTRA-ARTICULAR; INTRALESIONAL; INTRAMUSCULAR; INTRAVENOUS; SOFT TISSUE at 11:05

## 2017-05-31 RX ADMIN — Medication 10 ML: at 12:05

## 2017-05-31 RX ADMIN — FERUMOXYTOL 510 MG: 510 INJECTION INTRAVENOUS at 01:05

## 2017-05-31 RX ADMIN — ERYTHROPOIETIN 40000 UNITS: 40000 INJECTION, SOLUTION INTRAVENOUS; SUBCUTANEOUS at 01:05

## 2017-05-31 NOTE — NURSING
IV to left arm infiltrated ice placed to arm and sent home with ice pack.opal verbalizes understanding.

## 2017-06-06 DIAGNOSIS — C79.51 BONE METASTASES: ICD-10-CM

## 2017-06-06 DIAGNOSIS — C61 PROSTATE CANCER: ICD-10-CM

## 2017-06-06 DIAGNOSIS — R19.7 DIARRHEA, UNSPECIFIED TYPE: ICD-10-CM

## 2017-06-06 DIAGNOSIS — E86.0 DEHYDRATION: ICD-10-CM

## 2017-06-06 RX ORDER — ABIRATERONE ACETATE 250 MG/1
1000 TABLET ORAL DAILY
Qty: 112 TABLET | Refills: 2 | Status: SHIPPED | OUTPATIENT
Start: 2017-06-06 | End: 2017-08-08 | Stop reason: SDUPTHER

## 2017-06-07 ENCOUNTER — LAB VISIT (OUTPATIENT)
Dept: LAB | Facility: HOSPITAL | Age: 82
End: 2017-06-07
Attending: INTERNAL MEDICINE
Payer: MEDICARE

## 2017-06-07 ENCOUNTER — OFFICE VISIT (OUTPATIENT)
Dept: HEMATOLOGY/ONCOLOGY | Facility: CLINIC | Age: 82
End: 2017-06-07
Payer: MEDICARE

## 2017-06-07 VITALS
DIASTOLIC BLOOD PRESSURE: 52 MMHG | HEIGHT: 71 IN | TEMPERATURE: 98 F | HEART RATE: 78 BPM | SYSTOLIC BLOOD PRESSURE: 91 MMHG | RESPIRATION RATE: 18 BRPM | WEIGHT: 155.63 LBS | BODY MASS INDEX: 21.79 KG/M2

## 2017-06-07 DIAGNOSIS — C61 PROSTATE CANCER: ICD-10-CM

## 2017-06-07 DIAGNOSIS — T45.1X5A ANEMIA DUE TO ANTINEOPLASTIC CHEMOTHERAPY: ICD-10-CM

## 2017-06-07 DIAGNOSIS — R42 VERTIGO: Primary | ICD-10-CM

## 2017-06-07 DIAGNOSIS — R19.7 DIARRHEA, UNSPECIFIED TYPE: ICD-10-CM

## 2017-06-07 DIAGNOSIS — E86.0 DEHYDRATION: ICD-10-CM

## 2017-06-07 DIAGNOSIS — C79.51 BONE METASTASES: ICD-10-CM

## 2017-06-07 DIAGNOSIS — D64.81 ANEMIA DUE TO ANTINEOPLASTIC CHEMOTHERAPY: ICD-10-CM

## 2017-06-07 LAB
BASOPHILS # BLD AUTO: 0.02 K/UL
BASOPHILS NFR BLD: 0.4 %
DIFFERENTIAL METHOD: ABNORMAL
EOSINOPHIL # BLD AUTO: 0 K/UL
EOSINOPHIL NFR BLD: 0.7 %
ERYTHROCYTE [DISTWIDTH] IN BLOOD BY AUTOMATED COUNT: 21.2 %
HCT VFR BLD AUTO: 34.5 %
HGB BLD-MCNC: 10.9 G/DL
LYMPHOCYTES # BLD AUTO: 1 K/UL
LYMPHOCYTES NFR BLD: 17.7 %
MCH RBC QN AUTO: 30.7 PG
MCHC RBC AUTO-ENTMCNC: 31.6 %
MCV RBC AUTO: 97 FL
MONOCYTES # BLD AUTO: 0.4 K/UL
MONOCYTES NFR BLD: 7.2 %
NEUTROPHILS # BLD AUTO: 4.2 K/UL
NEUTROPHILS NFR BLD: 74 %
NRBC BLD-RTO: 0 /100 WBC
PLATELET # BLD AUTO: 130 K/UL
PMV BLD AUTO: 10.9 FL
RBC # BLD AUTO: 3.55 M/UL
WBC # BLD AUTO: 5.7 K/UL

## 2017-06-07 PROCEDURE — 85025 COMPLETE CBC W/AUTO DIFF WBC: CPT | Mod: PN

## 2017-06-07 PROCEDURE — 85025 COMPLETE CBC W/AUTO DIFF WBC: CPT

## 2017-06-07 PROCEDURE — 1126F AMNT PAIN NOTED NONE PRSNT: CPT | Mod: S$GLB,,, | Performed by: INTERNAL MEDICINE

## 2017-06-07 PROCEDURE — 99213 OFFICE O/P EST LOW 20 MIN: CPT | Mod: S$GLB,,, | Performed by: INTERNAL MEDICINE

## 2017-06-07 PROCEDURE — 36415 COLL VENOUS BLD VENIPUNCTURE: CPT | Mod: PN

## 2017-06-07 PROCEDURE — 1159F MED LIST DOCD IN RCRD: CPT | Mod: S$GLB,,, | Performed by: INTERNAL MEDICINE

## 2017-06-07 PROCEDURE — 99999 PR PBB SHADOW E&M-EST. PATIENT-LVL IV: CPT | Mod: PBBFAC,,, | Performed by: INTERNAL MEDICINE

## 2017-06-07 RX ORDER — MECLIZINE HYDROCHLORIDE 25 MG/1
25 TABLET ORAL 3 TIMES DAILY PRN
Qty: 100 TABLET | Refills: 3 | Status: SHIPPED | OUTPATIENT
Start: 2017-06-07 | End: 2018-08-14

## 2017-06-07 RX ORDER — PREDNISONE 10 MG/1
TABLET ORAL
Qty: 30 TABLET | Refills: 0 | Status: SHIPPED | OUTPATIENT
Start: 2017-06-07 | End: 2017-07-19 | Stop reason: SDUPTHER

## 2017-06-07 NOTE — PROGRESS NOTES
HISTORY OF PRESENT ILLNESS:  An 81-year-old gentleman with metastatic prostate   carcinoma and anemia due to systemic chemotherapy as well as iron deficiency,   has completed a course of Feraheme and returns to review interval CBC to assess   need of additional doses of Procrit.  New complaint today of positional vertigo.    The patient's energy is generally better since he received Feraheme.  No other   pertinent findings on a 10-point review of systems.    PHYSICAL EXAMINATION:  GENERAL:  Well-developed, well-nourished elderly white gentleman in no acute   distress.  VITAL SIGNS:  Weight 155-1/2 pounds (decreased by 3 pounds).  HEENT:  Normocephalic, atraumatic.  Oral mucosa pink and moist.  Lips without   lesions.  Tongue midline.  Oropharynx clear.  Nonicteric sclerae.  NECK:  Supple.  No adenopathy.  HEART:  Regular rate and rhythm without murmur, gallop or rub.  LUNGS:  Clear to auscultation bilaterally.  ABDOMEN:  Soft, nontender and nondistended with positive normoactive bowel   sounds.  No hepatosplenomegaly.  EXTREMITIES:  No cyanosis, clubbing or edema.  Distal pulses are intact.     LABORATORY:  White count 5.7, H and H 10.9 and 34.5, platelets are 130.    IMPRESSION:  1.  Metastatic prostate carcinoma.  2.  Anemia due to iron deficiency and chemotherapy - improved.  3.  Vertigo.    PLAN:  1.  Trial of Antivert 25 t.i.d. p.r.n. vertigo.  2.  Hold Procrit.  3.  Return to clinic in two weeks with interval CBC, CMP, LDH, mag and PSA at   which time we will begin the next cycle of abiraterone/prednisone.      FADUMO/PN  dd: 06/07/2017 10:20:09 (CDT)  td: 06/07/2017 13:02:33 (CDT)  Doc ID   #[id number]  Job ID #[id number]    CC:

## 2017-06-13 ENCOUNTER — TELEPHONE (OUTPATIENT)
Dept: VASCULAR SURGERY | Facility: CLINIC | Age: 82
End: 2017-06-13

## 2017-06-13 ENCOUNTER — TELEPHONE (OUTPATIENT)
Dept: PHARMACY | Facility: CLINIC | Age: 82
End: 2017-06-13

## 2017-06-13 DIAGNOSIS — I73.9 PERIPHERAL VASCULAR DISEASE, UNSPECIFIED: Primary | ICD-10-CM

## 2017-06-13 NOTE — TELEPHONE ENCOUNTER
Patient was due for a 2 year follow-up on his carotids this month. After review of patients records, he had a carotid us done 4/11/16 and Dr Dumont recommends repeating it next April. I will put him on hold list

## 2017-06-13 NOTE — TELEPHONE ENCOUNTER
----- Message from Deborah Le MA sent at 2015 10:24 AM CDT -----  Regardin yr f/u w/carotid us  2 yr f/u w/carotid us

## 2017-06-13 NOTE — TELEPHONE ENCOUNTER
----- Message from Neelam Prado sent at 6/12/2017  4:45 PM CDT -----  Contact: Cameron López calling to state received letter for follow up. Asking for next week in the morning. Please call [phone]. Thanks!

## 2017-06-19 ENCOUNTER — TELEPHONE (OUTPATIENT)
Dept: UROLOGY | Facility: CLINIC | Age: 82
End: 2017-06-19

## 2017-06-19 NOTE — TELEPHONE ENCOUNTER
----- Message from Ivanna Clarke sent at 6/19/2017  9:58 AM CDT -----  Patient is requesting to reschedule his appointment for 06/20/17, contact patient at [phone], to reschedule his procedure.     Brandi muniz

## 2017-06-19 NOTE — TELEPHONE ENCOUNTER
Spoke to pt and rescheduled his cystoscopy apt. Pt verbalized understanding of new date and time.

## 2017-06-20 ENCOUNTER — TELEPHONE (OUTPATIENT)
Dept: PHARMACY | Facility: CLINIC | Age: 82
End: 2017-06-20

## 2017-06-26 ENCOUNTER — TELEPHONE (OUTPATIENT)
Dept: PHARMACY | Facility: CLINIC | Age: 82
End: 2017-06-26

## 2017-06-26 DIAGNOSIS — I73.9 PERIPHERAL VASCULAR DISEASE, UNSPECIFIED: Primary | ICD-10-CM

## 2017-06-26 DIAGNOSIS — C61 PROSTATE CANCER: ICD-10-CM

## 2017-06-26 RX ORDER — PREDNISONE 10 MG/1
TABLET ORAL
Qty: 30 TABLET | Refills: 0 | Status: SHIPPED | OUTPATIENT
Start: 2017-06-26 | End: 2017-06-27

## 2017-06-27 ENCOUNTER — OFFICE VISIT (OUTPATIENT)
Dept: HEMATOLOGY/ONCOLOGY | Facility: CLINIC | Age: 82
End: 2017-06-27
Payer: MEDICARE

## 2017-06-27 VITALS
WEIGHT: 149.06 LBS | BODY MASS INDEX: 20.87 KG/M2 | HEIGHT: 71 IN | SYSTOLIC BLOOD PRESSURE: 107 MMHG | DIASTOLIC BLOOD PRESSURE: 60 MMHG | RESPIRATION RATE: 20 BRPM | HEART RATE: 66 BPM | TEMPERATURE: 98 F

## 2017-06-27 DIAGNOSIS — C79.51 BONE METASTASES: ICD-10-CM

## 2017-06-27 DIAGNOSIS — E87.5 HYPERKALEMIA: ICD-10-CM

## 2017-06-27 DIAGNOSIS — D64.81 ANEMIA DUE TO ANTINEOPLASTIC CHEMOTHERAPY: ICD-10-CM

## 2017-06-27 DIAGNOSIS — C61 PROSTATE CANCER: Primary | ICD-10-CM

## 2017-06-27 DIAGNOSIS — T45.1X5A ANEMIA DUE TO ANTINEOPLASTIC CHEMOTHERAPY: ICD-10-CM

## 2017-06-27 PROCEDURE — 99214 OFFICE O/P EST MOD 30 MIN: CPT | Mod: S$GLB,,, | Performed by: INTERNAL MEDICINE

## 2017-06-27 PROCEDURE — 1125F AMNT PAIN NOTED PAIN PRSNT: CPT | Mod: S$GLB,,, | Performed by: INTERNAL MEDICINE

## 2017-06-27 PROCEDURE — 99999 PR PBB SHADOW E&M-EST. PATIENT-LVL III: CPT | Mod: PBBFAC,,, | Performed by: INTERNAL MEDICINE

## 2017-06-27 PROCEDURE — 1159F MED LIST DOCD IN RCRD: CPT | Mod: S$GLB,,, | Performed by: INTERNAL MEDICINE

## 2017-06-27 NOTE — PROGRESS NOTES
The patient is an 81-year-old gentleman well known to me for metastatic prostate   carcinoma and anemia associated with therapy and iron deficiency.  He has had   replacement Feraheme and has had improvement in hemoglobin and hematocrit   significant enough to allow discontinuation of Procrit.  He returns to clinic   for interval reevaluation prior to next cycle of abiraterone/prednisone.  He is   complaining of increasing lower extremity weakness while on therapy.  He also   reports being treated for UTI since last office evaluation.  No other pertinent   findings on a 14-point review of systems.    PHYSICAL EXAMINATION:  GENERAL:  The patient is a well-developed, well-nourished elderly white   gentleman, in no acute distress.  VITAL SIGNS:  Weight of 149 pounds (decreased by 6.5 pounds).  HEENT:  Normocephalic, atraumatic.  Oral mucosa pink and moist.  Lips without   lesions.  Tongue midline.  Oropharynx clear.  Nonicteric sclerae.  NECK:  Supple, no adenopathy.  No carotid bruits, thyromegaly or thyroid nodule.  HEART:  Regular rate and rhythm without murmur, gallop or rub.  LUNGS:  Clear to auscultation bilaterally.  Normal respiratory effort.  ABDOMEN:  Soft, nontender, nondistended with positive normoactive bowel sounds,   no hepatosplenomegaly.  EXTREMITIES:  No cyanosis, clubbing or edema.  Distal pulses are intact.  AXILLAE AND GROIN:  No palpable pathologic lymphadenopathy is appreciated.  SKIN:  Intact/turgor normal.  NEUROLOGIC:  Cranial nerves II-XII grossly intact.  Motor:  Good muscle bulk and   tone.  Strength/sensory 5/5 throughout.  Ambulates with the assistance of a   cane.    LABORATORY:  White count 7.3, H and H 12 and 37.3, platelet count is 135.    Sodium 133, potassium 5.8, chloride 100, CO2 of 27, BUN 36, creatinine 1.3,   glucose 215, calcium 9.8, mag 2.1.  Liver function tests are within normal   limits.  LDH is 421.  GFR 49.  PSA 0.14 up from less than 0.7.    IMPRESSION:  1.  Metastatic  prostate carcinoma involving bone.  2.  Treatment-associated anemia -- currently well palliated.  3.  Lower extremity weakness/fall risk.  4.  Treated UTI.  5.  Hyperkalemia, on lab that is four days old.    PLAN:  1.  Referral for home PT.  2.  Replete serum potassium stat and treat as indicated after review.  3.  Proceed with next cycle of abiraterone/prednisone.  4.  Return to clinic in four weeks with interval CBC, CMP, LDH, mag, and PSA   prior to appointment.      FADUMO/PN  dd: 06/27/2017 14:32:44 (CDT)  td: 07/13/2017 03:43:13 (CDT)  Doc ID   #[id number]  Job ID #[id number]    CC:

## 2017-06-27 NOTE — ADDENDUM NOTE
Encounter addended by: Chris Fernandez MD on: 6/27/2017  2:39 PM<BR>    Actions taken: Problem List reviewed, Visit diagnoses modified, Diagnosis association updated, Order list changed, Treatment plan modified

## 2017-06-28 ENCOUNTER — TELEPHONE (OUTPATIENT)
Dept: PHARMACY | Facility: CLINIC | Age: 82
End: 2017-06-28

## 2017-06-28 DIAGNOSIS — C61 PROSTATE CA: Primary | ICD-10-CM

## 2017-06-28 DIAGNOSIS — C61 PROSTATE CANCER: ICD-10-CM

## 2017-06-28 RX ORDER — PREDNISONE 10 MG/1
TABLET ORAL
Qty: 30 TABLET | Refills: 0 | Status: SHIPPED | OUTPATIENT
Start: 2017-06-28 | End: 2017-08-07 | Stop reason: SDUPTHER

## 2017-07-03 ENCOUNTER — TELEPHONE (OUTPATIENT)
Dept: HEMATOLOGY/ONCOLOGY | Facility: CLINIC | Age: 82
End: 2017-07-03

## 2017-07-03 DIAGNOSIS — J44.9 CHRONIC OBSTRUCTIVE PULMONARY DISEASE, UNSPECIFIED COPD TYPE: Primary | ICD-10-CM

## 2017-07-03 NOTE — TELEPHONE ENCOUNTER
----- Message from Ann-Marie Montelongo sent at 7/3/2017 10:07 AM CDT -----  Contact: pt [phone]  Patient called to let you know he did find a [facility] on [address]   The fax is  [phone]

## 2017-07-03 NOTE — TELEPHONE ENCOUNTER
----- Message from Juhi Kaufman sent at 7/3/2017  9:15 AM CDT -----  Please call pt at [phone]..asking to speak to nurse about physical therapy

## 2017-07-03 NOTE — TELEPHONE ENCOUNTER
Called patient and notified that orders were sent to [facility], patient would rather go to [place] for treatment, patient to call with location and place to send referral

## 2017-07-06 ENCOUNTER — TELEPHONE (OUTPATIENT)
Dept: HEMATOLOGY/ONCOLOGY | Facility: CLINIC | Age: 82
End: 2017-07-06

## 2017-07-06 NOTE — TELEPHONE ENCOUNTER
----- Message from Desirae Freedman sent at 7/6/2017  8:36 AM CDT -----  Contact: wife- Frfajwrn-[phone]  Patient's wife called stating the patient fell, the wife says the patient will not go to the hospital.Patient fell on Monday,both arms have open wound on them. Patient will not go to the hospital. Call was placed to the pod.Thanks!

## 2017-07-06 NOTE — TELEPHONE ENCOUNTER
Patient fell on Monday injured both arms with deep lacerations, patient refused to go to the hospital when wife requested. He is having difficulty walking and getting up out of bed due to weakness. Discussed with jaylen van np and advised patient to go to the er for evaluation. Patients wife stated understanding and will try and discuss with the patient

## 2017-07-14 ENCOUNTER — HOSPITAL ENCOUNTER (OUTPATIENT)
Dept: RADIOLOGY | Facility: HOSPITAL | Age: 82
Discharge: HOME | End: 2017-07-14
Attending: THORACIC SURGERY (CARDIOTHORACIC VASCULAR SURGERY)
Payer: MEDICARE

## 2017-07-14 DIAGNOSIS — I73.9 PERIPHERAL VASCULAR DISEASE, UNSPECIFIED: ICD-10-CM

## 2017-07-14 PROCEDURE — 93925 LOWER EXTREMITY STUDY: CPT | Mod: TC,PO

## 2017-07-14 PROCEDURE — 93925 LOWER EXTREMITY STUDY: CPT | Mod: 26,,, | Performed by: RADIOLOGY

## 2017-07-14 PROCEDURE — 93922 UPR/L XTREMITY ART 2 LEVELS: CPT | Mod: 26,,, | Performed by: RADIOLOGY

## 2017-07-19 ENCOUNTER — PROCEDURE VISIT (OUTPATIENT)
Dept: UROLOGY | Facility: CLINIC | Age: 82
End: 2017-07-19
Payer: MEDICARE

## 2017-07-19 ENCOUNTER — TELEPHONE (OUTPATIENT)
Dept: UROLOGY | Facility: CLINIC | Age: 82
End: 2017-07-19

## 2017-07-19 VITALS
HEIGHT: 71 IN | DIASTOLIC BLOOD PRESSURE: 54 MMHG | BODY MASS INDEX: 20.83 KG/M2 | WEIGHT: 148.81 LBS | HEART RATE: 64 BPM | SYSTOLIC BLOOD PRESSURE: 107 MMHG

## 2017-07-19 DIAGNOSIS — D49.4 BLADDER TUMOR: Primary | ICD-10-CM

## 2017-07-19 DIAGNOSIS — N21.0 BLADDER STONE: ICD-10-CM

## 2017-07-19 DIAGNOSIS — R31.9 HEMATURIA: ICD-10-CM

## 2017-07-19 DIAGNOSIS — R30.0 DYSURIA: ICD-10-CM

## 2017-07-19 LAB
BILIRUB SERPL-MCNC: NORMAL MG/DL
BLOOD URINE, POC: 250
COLOR, POC UA: YELLOW
GLUCOSE UR QL STRIP: 50
KETONES UR QL STRIP: NORMAL
LEUKOCYTE ESTERASE URINE, POC: NORMAL
NITRITE, POC UA: NORMAL
PH, POC UA: 5
PROTEIN, POC: NORMAL
SPECIFIC GRAVITY, POC UA: 1.02
UROBILINOGEN, POC UA: NORMAL

## 2017-07-19 PROCEDURE — 81002 URINALYSIS NONAUTO W/O SCOPE: CPT | Mod: S$GLB,,, | Performed by: UROLOGY

## 2017-07-19 PROCEDURE — 52310 CYSTOSCOPY AND TREATMENT: CPT | Mod: S$GLB,,, | Performed by: UROLOGY

## 2017-07-19 PROCEDURE — 82365 CALCULUS SPECTROSCOPY: CPT

## 2017-07-19 RX ORDER — PHENAZOPYRIDINE HYDROCHLORIDE 200 MG/1
200 TABLET, FILM COATED ORAL 3 TIMES DAILY PRN
Qty: 30 TABLET | Refills: 3 | Status: SHIPPED | OUTPATIENT
Start: 2017-07-19 | End: 2017-07-29

## 2017-07-19 RX ORDER — TEMAZEPAM 30 MG/1
30 CAPSULE ORAL NIGHTLY PRN
COMMUNITY
Start: 2017-06-15 | End: 2018-06-19

## 2017-07-19 RX ORDER — NYSTATIN 100000 [USP'U]/G
POWDER TOPICAL
COMMUNITY
Start: 2017-07-18 | End: 2017-08-21 | Stop reason: CLARIF

## 2017-07-19 NOTE — TELEPHONE ENCOUNTER
Patient has stress test on 8/15/17.  He is scheduled for Bladder Tumor Removal with Dr. Delaney @ [facility] on 8/28/17.  Can we hold ASA for 7 days prior and Plavix for 5 days prior? Can we get clearance for general anesthesia please?

## 2017-07-19 NOTE — PROGRESS NOTES
UROLOGY [place]  7 19 17    c-c hematuria    Age 82, comes in for urologic w/u    CYSTOSCOPY and removal of stone: olympus flexible. Anterior urethra normal. Posterior urethra 4 cm, with bilobar hypertrophy. At the bladder neck there was a 1 cm stone wedged in the lumen. We were able to grab the stone with a grasping forceps and pull it out. The stone was sent to the lab for analysis. We continued with our cystoscopy. Bladder cavity distended equally and symmetrically when filled with water. On the R lateral wall there is a 4 cm tumor, sessile, slightly irregular, not bleeding. No other lesions found. There is mild trabeculation and some cellulae formation. No diverticula are present. Pt tolerated the procedure well.           IMP   Bladder cancer, will need turbt, can do bilateral retrograde pyelography at same time.    prostate ca, had malignant involvement of all sextants, of high grade with jenn 9 (5+4) in all of them, with a participation of between 90 and 100 percent of the tissue camples. Psa on presentation was 12  Metastatic w/u on diagnosis showed innumerable bony sclerotic lesions and also lymphadenopathy in the paraaortic nodes T3c N1 M1.

## 2017-07-21 ENCOUNTER — TELEPHONE (OUTPATIENT)
Dept: PHARMACY | Facility: CLINIC | Age: 82
End: 2017-07-21

## 2017-07-21 NOTE — TELEPHONE ENCOUNTER
refill call for ZYTIGA and PREDNISONE. He says he got a call from [employer] regarding shipping from [employer] (he was not sure....we are going to see how much medication he has on hand and possibly call about his daily.. he will also talk to his dr on monday. try to get him to let us fill here so he doesn't run out of medication (JIM/ NANCY aware also)

## 2017-07-24 ENCOUNTER — LAB VISIT (OUTPATIENT)
Dept: LAB | Facility: HOSPITAL | Age: 82
End: 2017-07-24
Attending: INTERNAL MEDICINE
Payer: MEDICARE

## 2017-07-24 DIAGNOSIS — C61 PROSTATE CANCER: ICD-10-CM

## 2017-07-24 DIAGNOSIS — C79.51 BONE METASTASES: ICD-10-CM

## 2017-07-24 DIAGNOSIS — T45.1X5A ANEMIA DUE TO ANTINEOPLASTIC CHEMOTHERAPY: ICD-10-CM

## 2017-07-24 DIAGNOSIS — D64.81 ANEMIA DUE TO ANTINEOPLASTIC CHEMOTHERAPY: ICD-10-CM

## 2017-07-24 LAB
ALBUMIN SERPL BCP-MCNC: 3.3 G/DL
ALP SERPL-CCNC: 59 U/L
ALT SERPL W/O P-5'-P-CCNC: 21 U/L
ANION GAP SERPL CALC-SCNC: 2 MMOL/L
ANNOTATION COMMENT IMP: NORMAL
AST SERPL-CCNC: 20 U/L
BASOPHILS # BLD AUTO: 0.03 K/UL
BASOPHILS NFR BLD: 0.6 %
BILIRUB SERPL-MCNC: 0.5 MG/DL
BUN SERPL-MCNC: 27 MG/DL
CALCIUM SERPL-MCNC: 9.9 MG/DL
CHLORIDE SERPL-SCNC: 102 MMOL/L
CO2 SERPL-SCNC: 30 MMOL/L
COMPLEXED PSA SERPL-MCNC: 0.08 NG/ML
COMPN STONE: NORMAL
CREAT SERPL-MCNC: 1.01 MG/DL
DIFFERENTIAL METHOD: ABNORMAL
EOSINOPHIL # BLD AUTO: 0 K/UL
EOSINOPHIL NFR BLD: 0.8 %
ERYTHROCYTE [DISTWIDTH] IN BLOOD BY AUTOMATED COUNT: 16.3 %
EST. GFR  (AFRICAN AMERICAN): >60 ML/MIN/1.73 M^2
EST. GFR  (NON AFRICAN AMERICAN): >60 ML/MIN/1.73 M^2
GLUCOSE SERPL-MCNC: 192 MG/DL
HCT VFR BLD AUTO: 30.8 %
HGB BLD-MCNC: 10.2 G/DL
LDH SERPL L TO P-CCNC: 415 U/L
LYMPHOCYTES # BLD AUTO: 0.9 K/UL
LYMPHOCYTES NFR BLD: 17.6 %
MAGNESIUM SERPL-MCNC: 1.8 MG/DL
MCH RBC QN AUTO: 31.6 PG
MCHC RBC AUTO-ENTMCNC: 33.1 G/DL
MCV RBC AUTO: 95 FL
MONOCYTES # BLD AUTO: 0.3 K/UL
MONOCYTES NFR BLD: 6.1 %
NEUTROPHILS # BLD AUTO: 3.8 K/UL
NEUTROPHILS NFR BLD: 74.9 %
NRBC BLD-RTO: 0 /100 WBC
PLATELET # BLD AUTO: 113 K/UL
PMV BLD AUTO: 11.4 FL
POTASSIUM SERPL-SCNC: 4.4 MMOL/L
PROT SERPL-MCNC: 5.6 G/DL
RBC # BLD AUTO: 3.23 M/UL
SODIUM SERPL-SCNC: 134 MMOL/L
SPECIMEN SOURCE: NORMAL
STONE ANALYSIS IR-IMP: NORMAL
WBC # BLD AUTO: 5.1 K/UL

## 2017-07-24 PROCEDURE — 84153 ASSAY OF PSA TOTAL: CPT | Mod: PN

## 2017-07-24 PROCEDURE — 83615 LACTATE (LD) (LDH) ENZYME: CPT

## 2017-07-24 PROCEDURE — 83735 ASSAY OF MAGNESIUM: CPT | Mod: PN

## 2017-07-24 PROCEDURE — 80053 COMPREHEN METABOLIC PANEL: CPT

## 2017-07-24 PROCEDURE — 80053 COMPREHEN METABOLIC PANEL: CPT | Mod: PN

## 2017-07-24 PROCEDURE — 36415 COLL VENOUS BLD VENIPUNCTURE: CPT | Mod: PN

## 2017-07-24 PROCEDURE — 83615 LACTATE (LD) (LDH) ENZYME: CPT | Mod: PN

## 2017-07-24 PROCEDURE — 84153 ASSAY OF PSA TOTAL: CPT

## 2017-07-24 PROCEDURE — 85025 COMPLETE CBC W/AUTO DIFF WBC: CPT

## 2017-07-24 PROCEDURE — 83735 ASSAY OF MAGNESIUM: CPT

## 2017-07-24 PROCEDURE — 85025 COMPLETE CBC W/AUTO DIFF WBC: CPT | Mod: PN

## 2017-07-25 ENCOUNTER — INFUSION (OUTPATIENT)
Dept: INFUSION THERAPY | Facility: HOSPITAL | Age: 82
End: 2017-07-25
Attending: INTERNAL MEDICINE
Payer: MEDICARE

## 2017-07-25 ENCOUNTER — TELEPHONE (OUTPATIENT)
Dept: CARDIOLOGY | Facility: CLINIC | Age: 82
End: 2017-07-25

## 2017-07-25 ENCOUNTER — OFFICE VISIT (OUTPATIENT)
Dept: HEMATOLOGY/ONCOLOGY | Facility: CLINIC | Age: 82
End: 2017-07-25
Payer: MEDICARE

## 2017-07-25 VITALS
WEIGHT: 151.88 LBS | DIASTOLIC BLOOD PRESSURE: 51 MMHG | BODY MASS INDEX: 21.26 KG/M2 | RESPIRATION RATE: 20 BRPM | TEMPERATURE: 98 F | HEIGHT: 71 IN | HEART RATE: 66 BPM | SYSTOLIC BLOOD PRESSURE: 88 MMHG

## 2017-07-25 VITALS
HEIGHT: 71 IN | DIASTOLIC BLOOD PRESSURE: 51 MMHG | BODY MASS INDEX: 21.26 KG/M2 | SYSTOLIC BLOOD PRESSURE: 88 MMHG | RESPIRATION RATE: 20 BRPM | WEIGHT: 151.88 LBS | HEART RATE: 66 BPM | TEMPERATURE: 98 F

## 2017-07-25 DIAGNOSIS — D64.9 ANEMIA, UNSPECIFIED TYPE: ICD-10-CM

## 2017-07-25 DIAGNOSIS — C79.51 BONE METASTASES: Primary | ICD-10-CM

## 2017-07-25 DIAGNOSIS — C61 PROSTATE CANCER: Primary | ICD-10-CM

## 2017-07-25 DIAGNOSIS — C79.51 BONE METASTASES: ICD-10-CM

## 2017-07-25 DIAGNOSIS — C61 PROSTATE CANCER: ICD-10-CM

## 2017-07-25 PROCEDURE — 63600175 PHARM REV CODE 636 W HCPCS: Mod: PN | Performed by: NURSE PRACTITIONER

## 2017-07-25 PROCEDURE — 1159F MED LIST DOCD IN RCRD: CPT | Mod: S$GLB,,, | Performed by: NURSE PRACTITIONER

## 2017-07-25 PROCEDURE — 1125F AMNT PAIN NOTED PAIN PRSNT: CPT | Mod: S$GLB,,, | Performed by: NURSE PRACTITIONER

## 2017-07-25 PROCEDURE — 99999 PR PBB SHADOW E&M-EST. PATIENT-LVL III: CPT | Mod: PBBFAC,,, | Performed by: NURSE PRACTITIONER

## 2017-07-25 PROCEDURE — 99214 OFFICE O/P EST MOD 30 MIN: CPT | Mod: S$GLB,,, | Performed by: NURSE PRACTITIONER

## 2017-07-25 PROCEDURE — 96372 THER/PROPH/DIAG INJ SC/IM: CPT | Mod: PN

## 2017-07-25 RX ADMIN — DENOSUMAB 120 MG: 120 INJECTION SUBCUTANEOUS at 12:07

## 2017-07-25 NOTE — PROGRESS NOTES
HISTORY OF PRESENT ILLNESS:  The patient is an 82-year-old white gentleman well   known to Dr. Fernandez for prostate carcinoma involving bone as well as indeterminate   pulmonary nodules.  He is managed on Abiraterone/Prednisone (23 Cycles).  He presents   to the clinic today for evaluation prior to next cycle of treatment. To note:  The patient underwent  a right fem/pop on 03/29/17 by Dr. Hartmann.  One week after this procedure, he reported   blood in his urine.  He was evaluated by Dr. Delaney on 05/09/17.  He recently had kidney   stones removed and discovery was made of bladder tumors.  He is scheduled to have these   removed by Dr. Delaney on 08/28.  He will undergo a nuclear stress test prior on 08/15. He   denies any fevers, chills, drenching night sweats, unexplained weight loss, abdominal discomfort/bloating,   nausea, vomiting, diarrhea, melena, bleeding, etc.  He reports weakness and dizziness.   No other complaints or pertinent findings on a 14-point review of system.    PHYSICAL EXAMINATION:  GENERAL:  Well-developed, elderly, frail white gentleman in no acute distress.  Alert & oriented x 3  VITAL SIGNS:  Weight of 151.9 pounds (gain of 2 pounds/4 weeks). BP 88/51, Pulse 66,  Respirations 20, Temp 98.4  HEENT:  Normocephalic, atraumatic.  Oral mucosa pink and moist.  Lips without   lesions.  Tongue midline.  Oropharynx clear.  Nonicteric sclerae.  NECK:  Supple, no adenopathy.  No carotid bruits, thyromegaly or thyroid nodule.  HEART:  Regular rate and rhythm without murmur, gallop or rub.  LUNGS:  Clear to auscultation bilaterally.  Normal respiratory effort.  ABDOMEN:  Soft, nontender, nondistended with positive normoactive bowel sounds,   no hepatosplenomegaly.  No CVA tenderness  EXTREMITIES:  No cyanosis, clubbing or edema.  Distal pulses are intact.  AXILLAE AND GROIN:  No palpable pathologic lymphadenopathy is appreciated.  SKIN:  Intact/turgor normal.  NEUROLOGIC:  Cranial nerves II-XII  grossly intact.  Motor:  Good muscle bulk and   tone.  Strength/sensory 5/5 throughout.  Gait stable.    LABORATORY:    Lab Results   Component Value Date    WBC 5.10 07/24/2017    HGB 10.2 (L) 07/24/2017    HCT 30.8 (L) 07/24/2017    MCV 95 07/24/2017     (L) 07/24/2017   Diff remarkable for 74.9% grans, lymphs 17.6%    CMP  Sodium   Date Value Ref Range Status   07/24/2017 134 (L) 136 - 145 mmol/L Final     Potassium   Date Value Ref Range Status   07/24/2017 4.4 3.5 - 5.1 mmol/L Final     Chloride   Date Value Ref Range Status   07/24/2017 102 95 - 110 mmol/L Final     CO2   Date Value Ref Range Status   07/24/2017 30 22 - 31 mmol/L Final     Glucose   Date Value Ref Range Status   07/24/2017 192 (H) 70 - 110 mg/dL Final     Comment:     The ADA recommends the following guidelines for fasting glucose:  Normal:       less than 100 mg/dL  Prediabetes:  100 mg/dL to 125 mg/dL  Diabetes:     126 mg/dL or higher       BUN, Bld   Date Value Ref Range Status   07/24/2017 27 (H) 9 - 21 mg/dL Final     Creatinine   Date Value Ref Range Status   07/24/2017 1.01 0.50 - 1.40 mg/dL Final     Calcium   Date Value Ref Range Status   07/24/2017 9.9 8.4 - 10.2 mg/dL Final     Total Protein   Date Value Ref Range Status   07/24/2017 5.6 (L) 6.0 - 8.4 g/dL Final     Albumin   Date Value Ref Range Status   07/24/2017 3.3 (L) 3.5 - 5.2 g/dL Final     Total Bilirubin   Date Value Ref Range Status   07/24/2017 0.5 0.2 - 1.3 mg/dL Final     Comment:     For infants and newborns, interpretation of results should be based  on gestational age, weight and in agreement with clinical  observations.  Premature Infant recommended reference ranges:  Up to 24 hours.............<8.0 mg/dL  Up to 48 hours............<12.0 mg/dL  3-5 days..................<15.0 mg/dL  6-29 days.................<15.0 mg/dL       Alkaline Phosphatase   Date Value Ref Range Status   07/24/2017 59 38 - 145 U/L Final     AST   Date Value Ref Range Status    07/24/2017 20 17 - 59 U/L Final     ALT   Date Value Ref Range Status   07/24/2017 21 10 - 44 U/L Final     Anion Gap   Date Value Ref Range Status   07/24/2017 2 (L) 8 - 16 mmol/L Final     eGFR if    Date Value Ref Range Status   07/24/2017 >60 >60 mL/min/1.73 m^2 Final     eGFR if non    Date Value Ref Range Status   07/24/2017 >60 >60 mL/min/1.73 m^2 Final     Comment:     Calculation used to obtain the estimated glomerular filtration  rate (eGFR) is the CKD-EPI equation. Since race is unknown   in our information system, the eGFR values for   -American and Non--American patients are given   for each creatinine result.       , Magnesium 1.8, PSA 0.08    IMPRESSION:  1.  Metastatic prostate carcinoma involving bone.  2.  Indeterminate pulmonary nodules for which a repeat CT imaging is scheduled in August.  3.  Worsening/symptomatic anemia in the setting of stage III CKD with likely iron deficiency - S/P Feraheme, stable.   4.  Coronary artery disease.  5.  Peripheral vascular disease  S/P Right Fem/Pop on 03/29/17 (Eckholdt)  6.  COPD.  7.  Hematuria - kidney stone; present bladder tumors    PLAN:  1.  Proceed with Cycle 24 of Abiraterone 1000 mg daily, Prednisone 10 mg daily.  2.  XGEVA 120mg today & reevaluate in 3 months.  4.  F/U in clinic in 4 weeks prior to next cycle with CBC, CMP, LDH, MG, PSA prior.    Assessment/plan reviewed and approved by Dr. Fernandez.

## 2017-07-25 NOTE — PLAN OF CARE
Problem: Patient Care Overview  Goal: Plan of Care Review  Outcome: Ongoing (interventions implemented as appropriate)  Pt. Tolerated xgeva injection without noted distress.  Pt. Admits to oral calcium w/ vitamin D replacement.    Patient discharged from infusion center ambulatory with cane. Patient had all present questions answered.  Staff message sent to Dr. Servin's staff regarding patient continuing IMDUR at current dose related to recent BP readings. Pt is to also attempt to contact office by phone.

## 2017-07-25 NOTE — TELEPHONE ENCOUNTER
----- Message from Guadalupe Hamm RN sent at 7/25/2017  1:21 PM CDT -----  Please call patient regarding BP readings (88/51)  Questioning to continue IMDUR.    Thanks

## 2017-08-04 DIAGNOSIS — C61 PROSTATE CANCER: ICD-10-CM

## 2017-08-07 RX ORDER — PREDNISONE 10 MG/1
10 TABLET ORAL DAILY
Qty: 30 TABLET | Refills: 0 | Status: SHIPPED | OUTPATIENT
Start: 2017-08-07 | End: 2017-11-07 | Stop reason: SDUPTHER

## 2017-08-07 NOTE — TELEPHONE ENCOUNTER
Hi Dr. Fernandez and staff,     Please send new rx for Prednisone 10mg to [facility]:  [id number], [place] [address].   Phone: [phone]     It is already one of the preferred pharmacies on his list. We [facility] due to changes in [employer] [facility] and can no longer supply the prednisone from [facility].     Please send to [facility] per patient request. Thank you.     Katerin Montoya, Pharm.D  Specialty Pharmacy Clinical Pharmacist  [facility]  Phone: [phone]

## 2017-08-08 DIAGNOSIS — C79.51 BONE METASTASES: ICD-10-CM

## 2017-08-08 DIAGNOSIS — C61 PROSTATE CANCER: ICD-10-CM

## 2017-08-08 DIAGNOSIS — E86.0 DEHYDRATION: ICD-10-CM

## 2017-08-08 DIAGNOSIS — R19.7 DIARRHEA, UNSPECIFIED TYPE: ICD-10-CM

## 2017-08-08 RX ORDER — ABIRATERONE ACETATE 250 MG/1
1000 TABLET ORAL DAILY
Qty: 112 TABLET | Refills: 2 | Status: SHIPPED | OUTPATIENT
Start: 2017-08-08 | End: 2017-11-07 | Stop reason: SDUPTHER

## 2017-08-09 RX ORDER — PREDNISONE 10 MG/1
TABLET ORAL
Qty: 30 TABLET | Refills: 0 | Status: SHIPPED | OUTPATIENT
Start: 2017-08-09 | End: 2017-08-09 | Stop reason: SDUPTHER

## 2017-08-14 ENCOUNTER — TELEPHONE (OUTPATIENT)
Dept: CARDIOLOGY | Facility: CLINIC | Age: 82
End: 2017-08-14

## 2017-08-14 NOTE — TELEPHONE ENCOUNTER
----- Message from Darrin Whelan sent at 8/14/2017  2:01 PM CDT -----  Contact: patient  Patient called with questions regarding upcoming stress test? Please call back at [phone] to advise. Thanks,

## 2017-08-15 ENCOUNTER — CLINICAL SUPPORT (OUTPATIENT)
Dept: CARDIOLOGY | Facility: CLINIC | Age: 82
End: 2017-08-15
Payer: MEDICARE

## 2017-08-15 ENCOUNTER — HOSPITAL ENCOUNTER (OUTPATIENT)
Dept: RADIOLOGY | Facility: HOSPITAL | Age: 82
Discharge: HOME | End: 2017-08-15
Attending: INTERNAL MEDICINE
Payer: MEDICARE

## 2017-08-15 DIAGNOSIS — T45.1X5A ANEMIA DUE TO ANTINEOPLASTIC CHEMOTHERAPY: ICD-10-CM

## 2017-08-15 DIAGNOSIS — D64.81 ANEMIA DUE TO ANTINEOPLASTIC CHEMOTHERAPY: ICD-10-CM

## 2017-08-15 DIAGNOSIS — Z95.1 S/P CABG (CORONARY ARTERY BYPASS GRAFT): ICD-10-CM

## 2017-08-15 DIAGNOSIS — I73.9 PERIPHERAL VASCULAR DISEASE, UNSPECIFIED: ICD-10-CM

## 2017-08-15 DIAGNOSIS — Z95.5 STATUS POST CORONARY ARTERY STENT PLACEMENT: Chronic | ICD-10-CM

## 2017-08-15 DIAGNOSIS — I25.119 CORONARY ARTERY DISEASE INVOLVING NATIVE CORONARY ARTERY OF NATIVE HEART WITH ANGINA PECTORIS: ICD-10-CM

## 2017-08-15 DIAGNOSIS — C61 PROSTATE CANCER: ICD-10-CM

## 2017-08-15 PROCEDURE — 78452 HT MUSCLE IMAGE SPECT MULT: CPT | Mod: 26,,, | Performed by: INTERNAL MEDICINE

## 2017-08-15 PROCEDURE — 93015 CV STRESS TEST SUPVJ I&R: CPT | Mod: S$GLB,,, | Performed by: INTERNAL MEDICINE

## 2017-08-16 LAB — DIASTOLIC DYSFUNCTION: NO

## 2017-08-17 ENCOUNTER — LAB VISIT (OUTPATIENT)
Dept: LAB | Facility: HOSPITAL | Age: 82
End: 2017-08-17
Attending: NURSE PRACTITIONER
Payer: MEDICARE

## 2017-08-17 DIAGNOSIS — C61 PROSTATE CANCER: ICD-10-CM

## 2017-08-17 LAB
ALBUMIN SERPL BCP-MCNC: 3.5 G/DL
ALP SERPL-CCNC: 56 U/L
ALT SERPL W/O P-5'-P-CCNC: 26 U/L
ANION GAP SERPL CALC-SCNC: 6 MMOL/L
AST SERPL-CCNC: 23 U/L
BASOPHILS # BLD AUTO: 0.03 K/UL
BASOPHILS NFR BLD: 0.5 %
BILIRUB SERPL-MCNC: 0.6 MG/DL
BUN SERPL-MCNC: 29 MG/DL
CALCIUM SERPL-MCNC: 10.1 MG/DL
CHLORIDE SERPL-SCNC: 101 MMOL/L
CO2 SERPL-SCNC: 25 MMOL/L
COMPLEXED PSA SERPL-MCNC: 0.11 NG/ML
CREAT SERPL-MCNC: 1.01 MG/DL
DIFFERENTIAL METHOD: ABNORMAL
EOSINOPHIL # BLD AUTO: 0.1 K/UL
EOSINOPHIL NFR BLD: 1 %
ERYTHROCYTE [DISTWIDTH] IN BLOOD BY AUTOMATED COUNT: 16.6 %
EST. GFR  (AFRICAN AMERICAN): >60 ML/MIN/1.73 M^2
EST. GFR  (NON AFRICAN AMERICAN): >60 ML/MIN/1.73 M^2
GLUCOSE SERPL-MCNC: 177 MG/DL
HCT VFR BLD AUTO: 30.9 %
HGB BLD-MCNC: 10.3 G/DL
LDH SERPL L TO P-CCNC: 495 U/L
LYMPHOCYTES # BLD AUTO: 1.1 K/UL
LYMPHOCYTES NFR BLD: 17.3 %
MAGNESIUM SERPL-MCNC: 1.8 MG/DL
MCH RBC QN AUTO: 31.5 PG
MCHC RBC AUTO-ENTMCNC: 33.3 G/DL
MCV RBC AUTO: 95 FL
MONOCYTES # BLD AUTO: 0.4 K/UL
MONOCYTES NFR BLD: 7.2 %
NEUTROPHILS # BLD AUTO: 4.5 K/UL
NEUTROPHILS NFR BLD: 74 %
NRBC BLD-RTO: 0 /100 WBC
PLATELET # BLD AUTO: 117 K/UL
PMV BLD AUTO: 10.9 FL
POTASSIUM SERPL-SCNC: 4.6 MMOL/L
PROT SERPL-MCNC: 5.8 G/DL
RBC # BLD AUTO: 3.27 M/UL
SODIUM SERPL-SCNC: 132 MMOL/L
WBC # BLD AUTO: 6.12 K/UL

## 2017-08-17 PROCEDURE — 80053 COMPREHEN METABOLIC PANEL: CPT | Mod: PN

## 2017-08-17 PROCEDURE — 36415 COLL VENOUS BLD VENIPUNCTURE: CPT | Mod: PN

## 2017-08-17 PROCEDURE — 84153 ASSAY OF PSA TOTAL: CPT

## 2017-08-17 PROCEDURE — 83735 ASSAY OF MAGNESIUM: CPT | Mod: PN

## 2017-08-17 PROCEDURE — 83615 LACTATE (LD) (LDH) ENZYME: CPT | Mod: PN

## 2017-08-17 PROCEDURE — 85025 COMPLETE CBC W/AUTO DIFF WBC: CPT

## 2017-08-17 PROCEDURE — 83615 LACTATE (LD) (LDH) ENZYME: CPT

## 2017-08-17 PROCEDURE — 83735 ASSAY OF MAGNESIUM: CPT

## 2017-08-17 PROCEDURE — 85025 COMPLETE CBC W/AUTO DIFF WBC: CPT | Mod: PN

## 2017-08-17 PROCEDURE — 84153 ASSAY OF PSA TOTAL: CPT | Mod: PN

## 2017-08-17 PROCEDURE — 80053 COMPREHEN METABOLIC PANEL: CPT

## 2017-08-21 ENCOUNTER — TELEPHONE (OUTPATIENT)
Dept: UROLOGY | Facility: CLINIC | Age: 82
End: 2017-08-21

## 2017-08-21 ENCOUNTER — TELEPHONE (OUTPATIENT)
Dept: CARDIOLOGY | Facility: CLINIC | Age: 82
End: 2017-08-21

## 2017-08-21 NOTE — TELEPHONE ENCOUNTER
----- Message from Shalini Fletcher sent at 8/21/2017 11:05 AM CDT -----  Contact: pre op  Po   Still holding for Clearance   Surgery 08 28 2017  Call back    Fax

## 2017-08-21 NOTE — TELEPHONE ENCOUNTER
Pt is scheduled for a Cystoscopy with Retrogrades and a TURBT at [facility] on 8/28/17. Pt needs cardiac clearance and permission to hold the Plavix and Aspirin 5 days prior to surgery. Pt has an apt with you on Thursday morning. HE normally sees dr Servin but he is out this week and we need clearance.

## 2017-08-21 NOTE — TELEPHONE ENCOUNTER
Spoke with [facility]- advised no clearance obtained- Dr. Servin returns next Monday- she will notify Dr. Delaney.

## 2017-08-21 NOTE — TELEPHONE ENCOUNTER
----- Message from Juhi Kaufman sent at 8/21/2017 12:24 PM CDT -----  Still needs cardiac clearance as requested .. Fax [phone]  [phone] Sirisha with [facility] preop

## 2017-08-22 ENCOUNTER — OFFICE VISIT (OUTPATIENT)
Dept: HEMATOLOGY/ONCOLOGY | Facility: CLINIC | Age: 82
End: 2017-08-22
Payer: MEDICARE

## 2017-08-22 VITALS
BODY MASS INDEX: 20.93 KG/M2 | SYSTOLIC BLOOD PRESSURE: 107 MMHG | TEMPERATURE: 98 F | WEIGHT: 149.5 LBS | HEART RATE: 79 BPM | HEIGHT: 71 IN | DIASTOLIC BLOOD PRESSURE: 57 MMHG | RESPIRATION RATE: 17 BRPM

## 2017-08-22 DIAGNOSIS — R91.8 PULMONARY NODULES: ICD-10-CM

## 2017-08-22 DIAGNOSIS — C61 PROSTATE CANCER: Primary | ICD-10-CM

## 2017-08-22 DIAGNOSIS — C79.51 BONE METASTASES: ICD-10-CM

## 2017-08-22 PROCEDURE — 3074F SYST BP LT 130 MM HG: CPT | Mod: S$GLB,,, | Performed by: NURSE PRACTITIONER

## 2017-08-22 PROCEDURE — 3008F BODY MASS INDEX DOCD: CPT | Mod: S$GLB,,, | Performed by: NURSE PRACTITIONER

## 2017-08-22 PROCEDURE — 3078F DIAST BP <80 MM HG: CPT | Mod: S$GLB,,, | Performed by: NURSE PRACTITIONER

## 2017-08-22 PROCEDURE — 99214 OFFICE O/P EST MOD 30 MIN: CPT | Mod: S$GLB,,, | Performed by: NURSE PRACTITIONER

## 2017-08-22 PROCEDURE — 1159F MED LIST DOCD IN RCRD: CPT | Mod: S$GLB,,, | Performed by: NURSE PRACTITIONER

## 2017-08-22 PROCEDURE — 99999 PR PBB SHADOW E&M-EST. PATIENT-LVL V: CPT | Mod: PBBFAC,,, | Performed by: NURSE PRACTITIONER

## 2017-08-22 NOTE — PROGRESS NOTES
HISTORY OF PRESENT ILLNESS:  The patient is an 82-year-old white gentleman well   known to Dr. Fernandez for prostate carcinoma involving bone as well as indeterminate   pulmonary nodules.  He is managed on Abiraterone/Prednisone (24 Cycles).  He presents   to the clinic today for evaluation prior to next cycle of treatment. To note:  The patient underwent  a right fem/pop on 03/29/17 by Dr. Hartmann.  One week after this procedure, he reported   blood in his urine.  He was evaluated by Dr. Delaney on 05/09/17.  He recently had kidney   stones removed and discovery was made of bladder tumors.  He is scheduled to have these   removed by Dr. Delaney on 08/28.  He has undergone a nuclear stress test on 08/15 and results   are pending. He denies any fevers, chills, drenching night sweats, unexplained weight loss, abdominal   discomfort/bloating, nausea, vomiting, diarrhea, melena, bleeding, etc.  He reports weakness and dizziness.  He started using a cane to assist with walking.  No other complaints or pertinent findings on a 14-point review of system.    PHYSICAL EXAMINATION:  GENERAL:  Well-developed, elderly, frail white gentleman in no acute distress.  Alert & oriented x 3  VITAL SIGNS:  Weight of 149.5 pounds (loss of 2 1/2 pounds/4 weeks). /57, Pulse 79,  Respirations 17, Temp 97.9  HEENT:  Normocephalic, atraumatic.  Oral mucosa pink and moist.  Lips without   lesions.  Tongue midline.  Oropharynx clear.  Nonicteric sclerae.  NECK:  Supple, no adenopathy.  No carotid bruits, thyromegaly or thyroid nodule.  HEART:  Regular rate and rhythm without murmur, gallop or rub.  LUNGS:  Clear to auscultation bilaterally.  Normal respiratory effort.  ABDOMEN:  Soft, nontender, nondistended with positive normoactive bowel sounds,   no hepatosplenomegaly.  No CVA tenderness  EXTREMITIES:  No cyanosis, clubbing or edema.  Distal pulses are intact.  AXILLAE AND GROIN:  No palpable pathologic lymphadenopathy is  appreciated.  SKIN:  Intact/turgor normal.  NEUROLOGIC:  Cranial nerves II-XII grossly intact.  Motor:  Good muscle bulk and   tone.  Strength/sensory 5/5 throughout.  Gait stable.    LABORATORY:    Lab Results   Component Value Date    WBC 6.12 08/17/2017    HGB 10.3 (L) 08/17/2017    HCT 30.9 (L) 08/17/2017    MCV 95 08/17/2017     (L) 08/17/2017   Diff remarkable for 74.0% grans, lymphs 17.3%    CMP  Sodium   Date Value Ref Range Status   08/17/2017 132 (L) 136 - 145 mmol/L Final     Potassium   Date Value Ref Range Status   08/17/2017 4.6 3.5 - 5.1 mmol/L Final     Chloride   Date Value Ref Range Status   08/17/2017 101 95 - 110 mmol/L Final     CO2   Date Value Ref Range Status   08/17/2017 25 22 - 31 mmol/L Final     Glucose   Date Value Ref Range Status   08/17/2017 177 (H) 70 - 110 mg/dL Final     Comment:     The ADA recommends the following guidelines for fasting glucose:  Normal:       less than 100 mg/dL  Prediabetes:  100 mg/dL to 125 mg/dL  Diabetes:     126 mg/dL or higher       BUN, Bld   Date Value Ref Range Status   08/17/2017 29 (H) 9 - 21 mg/dL Final     Creatinine   Date Value Ref Range Status   08/17/2017 1.01 0.50 - 1.40 mg/dL Final     Calcium   Date Value Ref Range Status   08/17/2017 10.1 8.4 - 10.2 mg/dL Final     Total Protein   Date Value Ref Range Status   08/17/2017 5.8 (L) 6.0 - 8.4 g/dL Final     Albumin   Date Value Ref Range Status   08/17/2017 3.5 3.5 - 5.2 g/dL Final     Total Bilirubin   Date Value Ref Range Status   08/17/2017 0.6 0.2 - 1.3 mg/dL Final     Alkaline Phosphatase   Date Value Ref Range Status   08/17/2017 56 38 - 145 U/L Final     AST   Date Value Ref Range Status   08/17/2017 23 17 - 59 U/L Final     ALT   Date Value Ref Range Status   08/17/2017 26 10 - 44 U/L Final     Anion Gap   Date Value Ref Range Status   08/17/2017 6 (L) 8 - 16 mmol/L Final     eGFR if    Date Value Ref Range Status   08/17/2017 >60 >60 mL/min/1.73 m^2 Final      eGFR if non    Date Value Ref Range Status   08/17/2017 >60 >60 mL/min/1.73 m^2 Final     Comment:     Calculation used to obtain the estimated glomerular filtration  rate (eGFR) is the CKD-EPI equation. Since race is unknown   in our information system, the eGFR values for   -American and Non--American patients are given   for each creatinine result.       , Magnesium 1.8, PSA 0.11 (0.08)    IMPRESSION:  1.  Metastatic prostate carcinoma involving bone - on Abiraterone  2.  Indeterminate pulmonary nodules for which a repeat CT imaging scheduled  3.  Worsening/symptomatic anemia in the setting of stage III CKD with likely iron deficiency - S/P Feraheme, stable.   4.  Coronary artery disease.  5.  Peripheral vascular disease  S/P Right Fem/Pop on 03/29/17 (Eckholdt)  6.  COPD.  7.  Hematuria - kidney stone; present bladder tumors; Cysto/TURBT with [facility] on 08/28  8.  Thrombocytopenia, stable.  9.  Hyponatremia    PLAN:  1.  Proceed with Cycle 25 of Abiraterone 1000 mg daily, Prednisone 10 mg daily.  2.  Hold Procrit  3.  F/U in clinic in 4 weeks prior to next cycle with CBC, CMP, LDH, MG, PSA, CT CHEST without prior.    Assessment/plan reviewed and approved by Dr. Fernandez.

## 2017-08-24 ENCOUNTER — OFFICE VISIT (OUTPATIENT)
Dept: CARDIOLOGY | Facility: CLINIC | Age: 82
End: 2017-08-24
Payer: MEDICARE

## 2017-08-24 VITALS
HEIGHT: 71 IN | HEART RATE: 64 BPM | WEIGHT: 150.81 LBS | BODY MASS INDEX: 21.11 KG/M2 | DIASTOLIC BLOOD PRESSURE: 62 MMHG | SYSTOLIC BLOOD PRESSURE: 137 MMHG

## 2017-08-24 DIAGNOSIS — J44.9 CHRONIC OBSTRUCTIVE PULMONARY DISEASE, UNSPECIFIED COPD TYPE: Primary | ICD-10-CM

## 2017-08-24 DIAGNOSIS — I73.9 PERIPHERAL VASCULAR DISEASE WITH CLAUDICATION: ICD-10-CM

## 2017-08-24 DIAGNOSIS — E78.5 DYSLIPIDEMIA: ICD-10-CM

## 2017-08-24 DIAGNOSIS — Z95.1 S/P CABG (CORONARY ARTERY BYPASS GRAFT): ICD-10-CM

## 2017-08-24 DIAGNOSIS — C61 PROSTATE CANCER: ICD-10-CM

## 2017-08-24 DIAGNOSIS — F17.210 CIGARETTE SMOKER: ICD-10-CM

## 2017-08-24 DIAGNOSIS — R94.39 ABNORMAL THALLIUM STRESS TEST: ICD-10-CM

## 2017-08-24 DIAGNOSIS — I25.119 CORONARY ARTERY DISEASE INVOLVING NATIVE CORONARY ARTERY OF NATIVE HEART WITH ANGINA PECTORIS: ICD-10-CM

## 2017-08-24 DIAGNOSIS — Z95.5 STATUS POST CORONARY ARTERY STENT PLACEMENT: Chronic | ICD-10-CM

## 2017-08-24 DIAGNOSIS — Z01.810 PREOP CARDIOVASCULAR EXAM: ICD-10-CM

## 2017-08-24 DIAGNOSIS — I73.9 CLAUDICATION: ICD-10-CM

## 2017-08-24 DIAGNOSIS — I10 ESSENTIAL HYPERTENSION: ICD-10-CM

## 2017-08-24 PROCEDURE — 99214 OFFICE O/P EST MOD 30 MIN: CPT | Mod: S$GLB,,, | Performed by: INTERNAL MEDICINE

## 2017-08-24 PROCEDURE — 1126F AMNT PAIN NOTED NONE PRSNT: CPT | Mod: S$GLB,,, | Performed by: INTERNAL MEDICINE

## 2017-08-24 PROCEDURE — 3075F SYST BP GE 130 - 139MM HG: CPT | Mod: S$GLB,,, | Performed by: INTERNAL MEDICINE

## 2017-08-24 PROCEDURE — 3078F DIAST BP <80 MM HG: CPT | Mod: S$GLB,,, | Performed by: INTERNAL MEDICINE

## 2017-08-24 PROCEDURE — 99999 PR PBB SHADOW E&M-EST. PATIENT-LVL III: CPT | Mod: PBBFAC,,, | Performed by: INTERNAL MEDICINE

## 2017-08-24 PROCEDURE — 1159F MED LIST DOCD IN RCRD: CPT | Mod: S$GLB,,, | Performed by: INTERNAL MEDICINE

## 2017-08-24 PROCEDURE — 3008F BODY MASS INDEX DOCD: CPT | Mod: S$GLB,,, | Performed by: INTERNAL MEDICINE

## 2017-08-24 NOTE — PROGRESS NOTES
Subjective:    Patient ID:  Cameron Bridges is a 82 y.o. male who presents for evaluation of established pt (established pt); needs clearance for surgery; and heart history      HPI 81 yo WM with multiple issues including prostate cancer and bladder tumor and scheduled for cystoscopy. Patient has multiple vascular issues including previous CABG, LM stent in 2015, PVD with fem-pop in right leg and stents in left leg with moderate carotid disease. Has had chronic stable exertional angina that occurs only if he really over does it. Had recent nuclear stress test that is abnormal but similar to previous study in 2014 and with his anatomy not surprising it is abnormal and he has declined further caths.    Impression: ABNORMAL MYOCARDIAL PERFUSION  1. There is evidence for a small to moderate sized area of mild myocardial ischemia of the anterolateral and small inferobasilar walls of the left ventricle.   2. The perfusion scan is free of evidence for myocardial injury.   3. There is abnormal wall motion at rest showing hypokinesis of the inferobasilar wall of the left ventricle.   4. There is resting LV dysfunction with a reduced ejection fraction of 43 %.   5. The ventricular volumes are normal at rest and stress.   6. The extracardiac distribution of radioactivity is normal.             This document has been electronically    SIGNED BY: Dianna Ramos MD On: 08/16/2017 18:55    Review of Systems   Constitution: Negative for decreased appetite, fever, weakness, malaise/fatigue, weight gain and weight loss.   HENT: Negative for headaches, hearing loss and nosebleeds.    Eyes: Negative for visual disturbance.   Cardiovascular: Positive for chest pain and claudication. Negative for cyanosis, dyspnea on exertion, irregular heartbeat, leg swelling, near-syncope, orthopnea, palpitations, paroxysmal nocturnal dyspnea and syncope.   Respiratory: Negative for cough, hemoptysis, shortness of breath, sleep disturbances due to  "breathing, snoring and wheezing.    Endocrine: Negative for cold intolerance, heat intolerance, polydipsia and polyuria.   Hematologic/Lymphatic: Negative for adenopathy and bleeding problem. Bruises/bleeds easily.   Skin: Negative for color change, itching, poor wound healing, rash and suspicious lesions.   Musculoskeletal: Negative for arthritis, back pain, falls, joint pain, joint swelling, muscle cramps, muscle weakness and myalgias.   Gastrointestinal: Negative for bloating, abdominal pain, change in bowel habit, constipation, flatus, heartburn, hematemesis, hematochezia, hemorrhoids, jaundice, melena, nausea and vomiting.   Genitourinary: Positive for hematuria. Negative for bladder incontinence, decreased libido, frequency, hesitancy and urgency.   Neurological: Positive for loss of balance. Negative for brief paralysis, difficulty with concentration, excessive daytime sleepiness, dizziness, focal weakness, light-headedness, numbness and vertigo.   Psychiatric/Behavioral: Negative for altered mental status, depression and memory loss. The patient does not have insomnia and is not nervous/anxious.    Allergic/Immunologic: Negative for environmental allergies, hives and persistent infections.        Objective:    Physical Exam   Constitutional: He is oriented to person, place, and time. He appears well-developed and well-nourished. No distress.   /62 (BP Location: Left arm, Patient Position: Sitting, BP Method: Large (Automatic))   Pulse 64   Ht 5' 11" (1.803 m)   Wt 68.4 kg (150 lb 12.7 oz)   BMI 21.03 kg/m²      HENT:   Head: Normocephalic and atraumatic.   Eyes: Conjunctivae and lids are normal. Pupils are equal, round, and reactive to light. Right eye exhibits no discharge. No scleral icterus.   Neck: Normal range of motion. Neck supple. No JVD present. No tracheal deviation present. No thyromegaly present.   Cardiovascular: Normal rate, regular rhythm, S1 normal, S2 normal, normal heart sounds " and intact distal pulses.  Exam reveals decreased pulses. Exam reveals no gallop and no friction rub.    No murmur heard.  Pulses:       Carotid pulses are 2+ on the right side with bruit, and 2+ on the left side with bruit.       Radial pulses are 2+ on the right side, and 2+ on the left side.        Femoral pulses are 2+ on the right side, and 2+ on the left side.       Popliteal pulses are 2+ on the right side, and 2+ on the left side.        Dorsalis pedis pulses are 2+ on the right side, and 2+ on the left side.        Posterior tibial pulses are 1+ on the right side, and 0 on the left side.   Pulmonary/Chest: Effort normal and breath sounds normal. No respiratory distress. He has no wheezes. He has no rales. He exhibits no tenderness.   Sternal scar   Abdominal: Soft. Bowel sounds are normal. He exhibits no distension and no mass. There is no hepatosplenomegaly or hepatomegaly. There is no tenderness. There is no rebound and no guarding.   Musculoskeletal: Normal range of motion. He exhibits no edema or tenderness.   Lymphadenopathy:     He has no cervical adenopathy.   Neurological: He is alert and oriented to person, place, and time. He has normal reflexes. No cranial nerve deficit. Coordination normal.   Skin: Skin is warm and dry. No rash noted. He is not diaphoretic. No erythema. No pallor.   Psychiatric: He has a normal mood and affect. His speech is normal and behavior is normal. Judgment and thought content normal. Cognition and memory are normal.         Assessment:       1. Chronic obstructive pulmonary disease, unspecified COPD type    2. Status post coronary artery stent placement - rotablator, stent to [facility] 01/06/2015    3. Coronary artery disease involving native coronary artery of native heart with angina pectoris    4. Claudication    5. Dyslipidemia    6. Essential hypertension    7. Peripheral vascular disease with claudication    8. S/P CABG (coronary artery bypass graft) - x4 2000; LIMA to  LAD (patent 2009); SVG to Ramus (occluded 2009); SVG to OMB (stent 2009); SVG to PDA (occluded 2009)    9. Prostate cancer    10. Cigarette smoker         Plan:     Patient is 82 years old with multiple vascular issues with COPD and current smoker. No absolute contraindications to surgery but increase risk. Can hold plavix but should continue ASA 81mg daily thru surgery.  No orders of the defined types were placed in this encounter.    Return for Has follow up with Dr Servin.

## 2017-08-24 NOTE — TELEPHONE ENCOUNTER
Spoke to olga in the [facility] pre-op department and the pt has been cleared by dr Venegas for surgery Monday.

## 2017-08-28 PROBLEM — D49.4 BLADDER TUMOR: Status: ACTIVE | Noted: 2017-08-28

## 2017-08-29 ENCOUNTER — TELEPHONE (OUTPATIENT)
Dept: UROLOGY | Facility: CLINIC | Age: 82
End: 2017-08-29

## 2017-08-29 NOTE — TELEPHONE ENCOUNTER
----- Message from Tamy Glover sent at 8/29/2017  9:02 AM CDT -----  [facility] wants to book a 2 week hospital follow up with patient. Please call patient to schedule at [phone].

## 2017-08-29 NOTE — TELEPHONE ENCOUNTER
I spoke to dr munoz and go the verbal order for the pyridium 100 mg tid prn. I informed Sandra of this order. She verbalized understanding.

## 2017-08-29 NOTE — TELEPHONE ENCOUNTER
----- Message from Ann-Marie Montelongo sent at 8/29/2017 10:43 AM CDT -----  Contact: Bria from [facility] FROM [address] [place] [address] [phone]  Call placed to rosalie Flores called from  [facility]  At [address] [place] [address] and asked for a call back the patient is requesting Pyridium. Call back  [phone]

## 2017-08-29 NOTE — TELEPHONE ENCOUNTER
Spoke to pt's wife and booked the post op apt with her for in 2 weeks. She verbalized understanding.

## 2017-08-30 ENCOUNTER — TELEPHONE (OUTPATIENT)
Dept: UROLOGY | Facility: CLINIC | Age: 82
End: 2017-08-30

## 2017-08-30 NOTE — TELEPHONE ENCOUNTER
Post Op TURBT: patient reports that he is having pain w/burning when he urinates.  He is urinating well otherwise.  NO fever, some blood in the urine but no clots noted. Patient is drinking fluids well and will contact office if he doesn't show slow steady improvement or if he has fever.

## 2017-08-31 ENCOUNTER — TELEPHONE (OUTPATIENT)
Dept: UROLOGY | Facility: CLINIC | Age: 82
End: 2017-08-31

## 2017-08-31 NOTE — TELEPHONE ENCOUNTER
----- Message from Darrin Whelan sent at 8/31/2017  1:22 PM CDT -----  Contact: patient  Patient called requesting a call back stated that he can't urinate he needs some relieve.patient requesting that Bria call back at [phone]. Thanks,

## 2017-08-31 NOTE — TELEPHONE ENCOUNTER
Patient had TURBT on Monday, he was doing well until today.  He last voided around 10:30 this morning and it was around 100 cc.  He is very sore in his lower abdomen and has been drinking a lot of fluids today to try to urinate.  Offered evaluation in office which patient does not have a way to get here.  Encouraged to go to ER for grove catheter placement. He will try to go to [facility] which is close to his home.  He will contact us in the morning with an update.

## 2017-09-05 ENCOUNTER — TELEPHONE (OUTPATIENT)
Dept: UROLOGY | Facility: CLINIC | Age: 82
End: 2017-09-05

## 2017-09-05 DIAGNOSIS — N32.9 LESION OF BLADDER: Primary | ICD-10-CM

## 2017-09-05 NOTE — TELEPHONE ENCOUNTER
----- Message from Ivanna Horn sent at 9/5/2017  9:52 AM CDT -----  Wife ([place]ia)is calling for patient's biopsy results.Please call back at [phone] to advise.  Thanks!

## 2017-09-05 NOTE — TELEPHONE ENCOUNTER
Please let pt know that his bladder lesion showed growth into one layer, but not into the second layer, which is the dangerous one.     Therefore, we have to follow up with a cysto in 3 months in the office

## 2017-09-12 ENCOUNTER — OFFICE VISIT (OUTPATIENT)
Dept: UROLOGY | Facility: CLINIC | Age: 82
End: 2017-09-12
Payer: MEDICARE

## 2017-09-12 VITALS
DIASTOLIC BLOOD PRESSURE: 60 MMHG | SYSTOLIC BLOOD PRESSURE: 125 MMHG | BODY MASS INDEX: 21.78 KG/M2 | HEIGHT: 70 IN | WEIGHT: 152.13 LBS | HEART RATE: 66 BPM

## 2017-09-12 DIAGNOSIS — Z09 POSTOP CHECK: ICD-10-CM

## 2017-09-12 DIAGNOSIS — R31.9 HEMATURIA: Primary | ICD-10-CM

## 2017-09-12 LAB
BILIRUB SERPL-MCNC: ABNORMAL MG/DL
BLOOD URINE, POC: ABNORMAL
COLOR, POC UA: YELLOW
GLUCOSE UR QL STRIP: 100
KETONES UR QL STRIP: ABNORMAL
LEUKOCYTE ESTERASE URINE, POC: ABNORMAL
NITRITE, POC UA: ABNORMAL
PH, POC UA: 5
PROTEIN, POC: ABNORMAL
SPECIFIC GRAVITY, POC UA: 1.01
UROBILINOGEN, POC UA: ABNORMAL

## 2017-09-12 PROCEDURE — 99024 POSTOP FOLLOW-UP VISIT: CPT | Mod: S$GLB,,, | Performed by: UROLOGY

## 2017-09-12 PROCEDURE — 81002 URINALYSIS NONAUTO W/O SCOPE: CPT | Mod: S$GLB,,, | Performed by: UROLOGY

## 2017-09-12 PROCEDURE — 99999 PR PBB SHADOW E&M-EST. PATIENT-LVL III: CPT | Mod: PBBFAC,,, | Performed by: UROLOGY

## 2017-09-12 NOTE — PROGRESS NOTES
UROLOGY [place]  9 12 17    c-c postop    Age 82, here to follow up on recent surgery.  He had a turbt of a bladder tumor in the r lateral wall of the bladder, 4 cm insize.  He also had removal of a bladder stone at that time.     There were no complications and pt stayed overnight in the hospital.     He then went home and has done well. No more hematuria at this time.    Also being followed for prostate ca, high grade and spread outside of the gland. Pt has done very well with hormonal treatment. He is having very little side effects of his hormonal suppression shots, but he did have a lot of sweats initially, in 2012, when first diagnosed and started on the LHRH and received one shot every 6 months for three years. After that, he has been in intermittent hormonal therapy.     His psa was 12 on presentation. It was 0.11 in aug2017    IMP  Bladder ca  Prostate ca    RTC 3 months for another cysto

## 2017-09-13 ENCOUNTER — HOSPITAL ENCOUNTER (OUTPATIENT)
Dept: RADIOLOGY | Facility: HOSPITAL | Age: 82
Discharge: HOME | End: 2017-09-13
Attending: NURSE PRACTITIONER
Payer: MEDICARE

## 2017-09-13 DIAGNOSIS — R91.8 PULMONARY NODULES: ICD-10-CM

## 2017-09-13 PROCEDURE — 71250 CT THORAX DX C-: CPT | Mod: 26,,, | Performed by: RADIOLOGY

## 2017-09-13 PROCEDURE — 71250 CT THORAX DX C-: CPT | Mod: TC,PO

## 2017-09-19 ENCOUNTER — INFUSION (OUTPATIENT)
Dept: INFUSION THERAPY | Facility: HOSPITAL | Age: 82
End: 2017-09-19
Attending: INTERNAL MEDICINE
Payer: MEDICARE

## 2017-09-19 ENCOUNTER — OFFICE VISIT (OUTPATIENT)
Dept: HEMATOLOGY/ONCOLOGY | Facility: CLINIC | Age: 82
End: 2017-09-19
Payer: MEDICARE

## 2017-09-19 VITALS
DIASTOLIC BLOOD PRESSURE: 48 MMHG | SYSTOLIC BLOOD PRESSURE: 92 MMHG | TEMPERATURE: 98 F | HEART RATE: 80 BPM | BODY MASS INDEX: 21.58 KG/M2 | HEIGHT: 71 IN | RESPIRATION RATE: 16 BRPM | WEIGHT: 154.13 LBS

## 2017-09-19 VITALS
SYSTOLIC BLOOD PRESSURE: 92 MMHG | RESPIRATION RATE: 16 BRPM | TEMPERATURE: 98 F | HEART RATE: 80 BPM | DIASTOLIC BLOOD PRESSURE: 48 MMHG | HEIGHT: 71 IN | BODY MASS INDEX: 21.58 KG/M2 | WEIGHT: 154.13 LBS

## 2017-09-19 DIAGNOSIS — T45.1X5A CHEMOTHERAPY-INDUCED THROMBOCYTOPENIA: ICD-10-CM

## 2017-09-19 DIAGNOSIS — C61 PROSTATE CANCER: Primary | ICD-10-CM

## 2017-09-19 DIAGNOSIS — D64.81 ANEMIA DUE TO ANTINEOPLASTIC CHEMOTHERAPY: ICD-10-CM

## 2017-09-19 DIAGNOSIS — D69.59 CHEMOTHERAPY-INDUCED THROMBOCYTOPENIA: ICD-10-CM

## 2017-09-19 DIAGNOSIS — T45.1X5A ANEMIA DUE TO ANTINEOPLASTIC CHEMOTHERAPY: ICD-10-CM

## 2017-09-19 DIAGNOSIS — R91.8 PULMONARY NODULES: ICD-10-CM

## 2017-09-19 DIAGNOSIS — C79.51 BONE METASTASES: ICD-10-CM

## 2017-09-19 DIAGNOSIS — D64.9 ANEMIA: Primary | ICD-10-CM

## 2017-09-19 PROCEDURE — 1125F AMNT PAIN NOTED PAIN PRSNT: CPT | Mod: S$GLB,,, | Performed by: INTERNAL MEDICINE

## 2017-09-19 PROCEDURE — 99999 PR PBB SHADOW E&M-EST. PATIENT-LVL IV: CPT | Mod: PBBFAC,,, | Performed by: INTERNAL MEDICINE

## 2017-09-19 PROCEDURE — 3008F BODY MASS INDEX DOCD: CPT | Mod: S$GLB,,, | Performed by: INTERNAL MEDICINE

## 2017-09-19 PROCEDURE — 3074F SYST BP LT 130 MM HG: CPT | Mod: S$GLB,,, | Performed by: INTERNAL MEDICINE

## 2017-09-19 PROCEDURE — 3078F DIAST BP <80 MM HG: CPT | Mod: S$GLB,,, | Performed by: INTERNAL MEDICINE

## 2017-09-19 PROCEDURE — 1159F MED LIST DOCD IN RCRD: CPT | Mod: S$GLB,,, | Performed by: INTERNAL MEDICINE

## 2017-09-19 PROCEDURE — 96372 THER/PROPH/DIAG INJ SC/IM: CPT | Mod: PN

## 2017-09-19 PROCEDURE — 63600175 PHARM REV CODE 636 W HCPCS: Mod: PN | Performed by: INTERNAL MEDICINE

## 2017-09-19 PROCEDURE — 99215 OFFICE O/P EST HI 40 MIN: CPT | Mod: S$GLB,,, | Performed by: INTERNAL MEDICINE

## 2017-09-19 RX ADMIN — ERYTHROPOIETIN 40000 UNITS: 40000 INJECTION, SOLUTION INTRAVENOUS; SUBCUTANEOUS at 12:09

## 2017-09-19 NOTE — PROGRESS NOTES
Date of Service: 09/19/2017  HISTORY OF PRESENT ILLNESS:  The patient is an 82-year-old white gentleman well   known to me for metastatic prostate carcinoma involving bones as well as   indeterminate pulmonary nodules.  The patient is currently managed with   abiraterone/prednisone.  He has had interval lab and noncontrast CT of the chest   for followup of previously documented pulmonary nodules.  He continues to   experience some difficulty with lower extremity weakness and wishes to resume   physical therapy.  The patient is dyspneic with exertion and feels that he may   be anemic.    PHYSICAL EXAMINATION:  GENERAL:  Well-developed, thin-appearing, elderly, white gentleman in no acute   distress, with a weight of 154 pounds (increased by 3.5 pounds).  VITAL SIGNS:  Documented in EMR and reviewed.  HEENT:  Normocephalic, atraumatic.  Oral mucosa pink and moist.  Lips without   lesions.  Tongue midline.  Oropharynx clear.  Nonicteric sclerae.  NECK:  Supple, no adenopathy.  No carotid bruits, thyromegaly or thyroid nodule.  HEART:  Regular rate and rhythm without murmur, gallop or rub.  LUNGS:  Clear to auscultation bilaterally.  Normal respiratory effort.  ABDOMEN:  Soft, nontender, nondistended with positive normoactive bowel sounds,   no hepatosplenomegaly.  AXILLAE AND GROIN:  No palpable pathologic lymphadenopathy is appreciated.  SKIN:  Intact/turgor normal.  NEUROLOGIC:  Cranial nerves II-XII grossly intact.  Motor:  Good muscle bulk and   tone.  Strength/sensory 5/5 throughout.  Gait stable.  EXTREMITIES:  2+ bilateral ankle edema.  Distal pulses intact.    LABORATORY:  White count 5.2, H and H 8.5 and 26.3, platelet count of 117.    Sodium 140, potassium 4.4, chloride 105, CO2 26, BUN 21, creatinine 1, glucose   203, calcium 8.8, mag 1.8.  Liver function tests are within normal limits.  LDH   205.  PSA stable at 0.15.    CT of the chest, stable pulmonary nodules and healed sclerotic bone lesions, no    evidence of progressive disease.    IMPRESSION:  1.  Metastatic prostate carcinoma involving bone with continued clinical   response to abiraterone/prednisone.  2.  Stable/indeterminate pulmonary nodules.  3.  Symptomatic chemotherapy-associated anemia.  4.  Treatment-associated thrombocytopenia.    PLAN:  1.  Proceed with 26th cycle of therapy to consist of abiraterone 1000 mg p.o.   daily and prednisone 10 mg p.o. daily.  2.  Repeat iron, TIBC, ferritin, soluble transferrin receptor and supplement as   indicated.  3.  Reinitiate Procrit 40,000 units subcutaneously per week x4.  4.  The patient cleared to participate in PT.  5.  Return to clinic in four weeks with interval CBC, CMP, LDH, mag, and PSA   prior to appointment.      FADUMO/HN  dd: 09/19/2017 11:43:42 (CDT)  td: 09/20/2017 01:28:13 (CDT)  Doc ID   #[id number]  Job ID #[id number]    CC:

## 2017-09-19 NOTE — PLAN OF CARE
Problem: Patient Care Overview  Goal: Individualization & Mutuality  Outcome: Ongoing (interventions implemented as appropriate)  TOLERATED TREATMENT WITHOUT DIFFICULTY.

## 2017-09-22 DIAGNOSIS — D50.9 IRON DEFICIENCY ANEMIA, UNSPECIFIED IRON DEFICIENCY ANEMIA TYPE: ICD-10-CM

## 2017-09-22 RX ORDER — FAMOTIDINE 10 MG/ML
20 INJECTION INTRAVENOUS 2 TIMES DAILY
Status: CANCELLED
Start: 2017-10-03

## 2017-09-22 RX ORDER — DIPHENHYDRAMINE HYDROCHLORIDE 50 MG/ML
50 INJECTION INTRAMUSCULAR; INTRAVENOUS ONCE
Status: CANCELLED
Start: 2017-10-03

## 2017-09-22 RX ORDER — HEPARIN 100 UNIT/ML
500 SYRINGE INTRAVENOUS
Status: CANCELLED | OUTPATIENT
Start: 2017-10-03

## 2017-09-22 RX ORDER — SODIUM CHLORIDE 0.9 % (FLUSH) 0.9 %
10 SYRINGE (ML) INJECTION
Status: CANCELLED | OUTPATIENT
Start: 2017-10-03

## 2017-09-26 ENCOUNTER — INFUSION (OUTPATIENT)
Dept: INFUSION THERAPY | Facility: HOSPITAL | Age: 82
End: 2017-09-26
Attending: INTERNAL MEDICINE
Payer: MEDICARE

## 2017-09-26 VITALS
HEART RATE: 66 BPM | DIASTOLIC BLOOD PRESSURE: 59 MMHG | RESPIRATION RATE: 17 BRPM | HEIGHT: 71 IN | WEIGHT: 152.13 LBS | TEMPERATURE: 98 F | SYSTOLIC BLOOD PRESSURE: 113 MMHG | BODY MASS INDEX: 21.3 KG/M2

## 2017-09-26 DIAGNOSIS — E86.0 DEHYDRATION: Primary | ICD-10-CM

## 2017-09-26 DIAGNOSIS — R19.7 DIARRHEA, UNSPECIFIED TYPE: ICD-10-CM

## 2017-09-26 DIAGNOSIS — C61 PROSTATE CANCER: ICD-10-CM

## 2017-09-26 DIAGNOSIS — C79.51 BONE METASTASES: ICD-10-CM

## 2017-09-26 PROCEDURE — 96372 THER/PROPH/DIAG INJ SC/IM: CPT | Mod: PN

## 2017-09-26 PROCEDURE — 63600175 PHARM REV CODE 636 W HCPCS: Mod: PN | Performed by: INTERNAL MEDICINE

## 2017-09-26 RX ADMIN — ERYTHROPOIETIN 40000 UNITS: 40000 INJECTION, SOLUTION INTRAVENOUS; SUBCUTANEOUS at 01:09

## 2017-09-26 NOTE — PATIENT INSTRUCTIONS
"  Preventing Falls: Are You At Risk of Falling?     Ask for help to reduce risk of falling in your home.     As you get older, you're not as steady on your feet as you once were. And you may have health problems you didn't have when you were younger. So, it's not surprising that older people are more likely to trip and fall. Falling can be very serious. It can change your overall health and quality of life. That's why it's important to be aware of your own risk of falling.  The dangers of falling  Falls are one of the main causes of injury in people over age 65. An older person who falls may take longer to get better than a younger person. And, after a fall, an older person is more likely to have problems that don't go away. So, preventing falls can help you avoid serious health problems.  Are you at risk of falling?  Answer these questions to rate your level of risk.  · Are you a woman?  · Have you fallen or stumbled in the last year?  · Are you over age 65?  · Are you ever dizzy or lightheaded with standing?  · Do you have a hard time getting in and out of the bathtub or on and off the toilet?  · Do you lean on objects to help you get around? Or do you use a cane or walker?  · Do you have vision or hearing problems? For example, do you need new glasses or hearing aids?  · Do you have 2 or more long-lasting (chronic) medical conditions?  · Do you take 3 or more medicines?  · Have you felt depressed recently?  · Have you had more trouble with your memory in recent months?  · Are there hazards in your home that might cause you to fall, such as loose rugs or poor lighting?  · Do you have a pet that jumps on you or might trip you?  · Have you stopped getting regular exercise?  · Do you have diabetes?   · Do you have a neurologic disease, such as Parkinson or Alzheimer disease?   · Do you drink alcohol?  · Do you wear athletic shoes or slippers, or go barefoot at home?  You can help prevent falls  If you answered "yes" " to any of the above questions, you should take steps to reduce your risk of a fall. Monitoring health conditions and keeping walkways in your home free of clutter are just 2 ways. Changing is sometimes easier said than done. But keep in mind that even small changes can make you less likely to fall.  The fear of falling  It's normal to be scared of falling, especially if you've fallen before. But being afraid can actually make you more likely to fall. This is because:  · Fear might cause you to become less active. Being less active can lead to a loss of strength and balance.  · Fear can lead to isolation from others, depression, or the use of more medicines or alcohol. And all these things make falling even more likely.  To break the cycle, learn more about ways to avoid falling. As you take control, you may find yourself feeling less afraid.   Date Last Reviewed: 6/12/2015  © 7872-4950 [employer]. [address] [place] [address]. All rights reserved. This information is not intended as a substitute for professional medical care. Always follow your healthcare professional's instructions.        Exercises to Prevent Falls  Certain types of exercises may help make you less likely to fall. Try the ones below. Or do other exercises that your health care provider suggests. Depending on your health, you may need to start slowly. Don't let that stop you. Even small amounts of exercise can help you. Be sure to talk to your health care provider before starting any exercise program.       Improve balance  Many types of exercise can help improve balance. Mayo chi and yoga are good examples. Here's another one to try. You can do it anytime and almost anywhere.  · Stand next to a counter or solid support.  · Push yourself up onto your tiptoes.  · Hold for 5 seconds. If you start to lose your balance, hold on to the counter.  · Rest and repeat 5 times. Work up to holding for 20 to 30 seconds, if you can.  "Increase flexibility  Being more flexible makes it easier for you to move around safely. Try exercises like the seated hamstring stretch.  · Sit in a chair and put one foot on a stool.  · Straighten your leg and reach with both hands down either side of your leg. Reach as far down your leg as you can.  · Hold for about 20 seconds.  · Go back to the starting position. Then repeat 5 times. Switch legs. Build strength  "Resistance" exercises help build strength. You can do them without equipment. Or you can use weights, elastic bands, or special machines. One such exercise is called the biceps curl. You can hold a 1-pound weight or even a can of soup. Do this exercise at least 3 times a week. Strive for every day.  · Sit up straight in a chair.  · Keep your elbow close to your body and your wrist straight.  · Bend your arm, moving your hand up to your shoulder. Then slowly lower your arm.  · Repeat 5 times. Switch to the other arm.   Build your staying power  Aerobic exercises make your heart and lungs stronger so you can keep moving longer. Walking and swimming are two of the best types of exercises you can do. Using a stationary bike is great, too. Find an aerobic exercise that you enjoy. Start slowly and build up. Even 5 minutes is helpful. Aim for a goal of 30 minutes, at least 3 times a week. You don't have to do 30 minutes in 1 session. Break it up and walk a little throughout the day.  More helpful tips  · Start easy. Slowly work up to doing more.  · Talk with your health care provider about the best exercises for you.  · Call senior centers or health clubs about exercise programs.  · If needed, have a family member watch you walk every so often to check your stability.  · Exercise with a friend. Choose an activity you both enjoy.  · Consider lyndsey chi or yoga to strengthen your balance.  · Try exercises that you can do anytime, anywhere. Here are 2 examples. Have someone with you when you first try " these:  ¨ Practice walking by placing 1 foot right in front of the other.  ¨ Stand up and sit down 10 times. Repeat this throughout the day.   Date Last Reviewed: 6/13/2015  © 0293-4254 [employer]. [address] [place] [address], [place] [address]. All rights reserved. This information is not intended as a substitute for professional medical care. Always follow your healthcare professional's instructions.        Preventing Falls: How to Prepare and What to Do    Falling is not something you want to think about. But it can make a big difference to plan ahead. If you're prepared, you'll know how to get help. And you'll be less likely to panic if you fall. This means you'll be able to do what's needed to get help right away.  How to prepare  · Have someone check on you daily.  · Keep a list of emergency numbers near the phone.  · Always have a way to call for help. Keep a cell phone with you at all times. Or talk with your healthcare provider about how to set up a home monitoring service. This involves wearing a small device around your neck or wrist. If you fall, you can press the button on the device. This alerts emergency responders.  · Talk with your healthcare provider about an exercise program that's right for you. Regular exercise may reduce the risk of falling and the risk for injury related to a fall.  · It's important to have good lighting in your home. Avoid using throw rugs, because they can raise your risk of tripping and falling. Add grab bars in the bathroom to help reduce the risk of falling. Small changes can make your home safer. Talk with your healthcare provider about making your home safer.  What to do if you fall  Above all, try to stay calm:  · If you start to fall, try to relax your body. This will reduce the impact of the fall.  · After you fall, press your monitor button, or phone for help.  · Don't rush to get up. First, make sure you're not hurt.  · Roll onto your side, then crawl to  a chair. Pull yourself up onto the chair slowly.  · You should be checked if you struck your head, lost consciousness, were confused afterward, or have any other concerns for injury.  · Be sure to tell your doctor that you fell.  A note to family and friends  If you're with a loved one when he or she starts to fall, don't try to stop the fall. Ease the person to the floor carefully, so neither of you gets hurt. Don't leave the person alone. And don't try to move him or her. Put a pillow under his or her head. Check for injuries. If help is needed right away, be sure to call 911.   Date Last Reviewed: 6/13/2015 © 2000-2017 [employer]. [address] [place] [address], [place] [address]. All rights reserved. This information is not intended as a substitute for professional medical care. Always follow your healthcare professional's instructions.        Epoetin Nico injection  What is this medicine?  EPOETIN NICO (e CHYNA e tin AL fa) helps your body make more red blood cells. This medicine is used to treat anemia caused by chronic kidney failure, cancer chemotherapy, or HIV-therapy. It may also be used before surgery if you have anemia.  How should I use this medicine?  This medicine is for injection into a vein or under the skin. It is usually given by a health care professional in a hospital or clinic setting.  If you get this medicine at home, you will be taught how to prepare and give this medicine. Use exactly as directed. Take your medicine at regular intervals. Do not take your medicine more often than directed.  It is important that you put your used needles and syringes in a special sharps container. Do not put them in a trash can. If you do not have a sharps container, call your pharmacist or healthcare provider to get one.  Talk to your pediatrician regarding the use of this medicine in children. While this drug may be prescribed for selected conditions, precautions do apply.  What side effects may I  notice from receiving this medicine?  Side effects that you should report to your doctor or health care professional as soon as possible:  · allergic reactions like skin rash, itching or hives, swelling of the face, lips, or tongue  · breathing problems  · changes in vision  · chest pain  · confusion, trouble speaking or understanding  · feeling faint or lightheaded, falls  · high blood pressure  · muscle aches or pains  · pain, swelling, warmth in the leg  · rapid weight gain  · severe headaches  · sudden numbness or weakness of the face, arm or leg  · trouble walking, dizziness, loss of balance or coordination  · seizures (convulsions)  · swelling of the ankles, feet, hands  · unusually weak or tired  Side effects that usually do not require medical attention (report to your doctor or health care professional if they continue or are bothersome):  · diarrhea  · fever, chills (flu-like symptoms)  · headaches  · nausea, vomiting  · redness, stinging, or swelling at site where injected  What may interact with this medicine?  Do not take this medicine with any of the following medications:  · darbepoetin andrew  What if I miss a dose?  If you miss a dose, take it as soon as you can. If it is almost time for your next dose, take only that dose. Do not take double or extra doses.  Where should I keep my medicine?  Keep out of the reach of children.  Store in a refrigerator between 2 and 8 degrees C (36 and 46 degrees F). Do not freeze or shake. Throw away any unused portion if using a single-dose vial. Multi-dose vials can be kept in the refrigerator for up to 21 days after the initial dose. Throw away unused medicine.  What should I tell my health care provider before I take this medicine?  They need to know if you have any of these conditions:  · blood clotting disorders  · cancer patient not on chemotherapy  · cystic fibrosis  · heart disease, such as angina or heart failure  · hemoglobin level of 12 g/dL or  greater  · high blood pressure  · low levels of folate, iron, or vitamin B12  · seizures  · an unusual or allergic reaction to erythropoietin, albumin, benzyl alcohol, hamster proteins, other medicines, foods, dyes, or preservatives  · pregnant or trying to get pregnant  · breast-feeding  What should I watch for while using this medicine?  Visit your prescriber or health care professional for regular checks on your progress and for the needed blood tests and blood pressure measurements. It is especially important for the doctor to make sure your hemoglobin level is in the desired range, to limit the risk of potential side effects and to give you the best benefit. Keep all appointments for any recommended tests. Check your blood pressure as directed. Ask your doctor what your blood pressure should be and when you should contact him or her.  As your body makes more red blood cells, you may need to take iron, folic acid, or vitamin B supplements. Ask your doctor or health care provider which products are right for you. If you have kidney disease continue dietary restrictions, even though this medication can make you feel better. Talk with your doctor or health care professional about the foods you eat and the vitamins that you take.  NOTE:This sheet is a summary. It may not cover all possible information. If you have questions about this medicine, talk to your doctor, pharmacist, or health care provider. Copyright© 2017 [employer]

## 2017-09-26 NOTE — PLAN OF CARE
Problem: Patient Care Overview  Goal: Plan of Care Review  Outcome: Ongoing (interventions implemented as appropriate)  Pt tolerated injection well without complaints. D/c to home with cane and VSS.

## 2017-10-03 ENCOUNTER — INFUSION (OUTPATIENT)
Dept: INFUSION THERAPY | Facility: HOSPITAL | Age: 82
End: 2017-10-03
Attending: INTERNAL MEDICINE
Payer: MEDICARE

## 2017-10-03 VITALS
RESPIRATION RATE: 16 BRPM | SYSTOLIC BLOOD PRESSURE: 139 MMHG | BODY MASS INDEX: 21.44 KG/M2 | TEMPERATURE: 98 F | HEIGHT: 71 IN | DIASTOLIC BLOOD PRESSURE: 70 MMHG | HEART RATE: 61 BPM | WEIGHT: 153.13 LBS

## 2017-10-03 DIAGNOSIS — C61 PROSTATE CANCER: ICD-10-CM

## 2017-10-03 DIAGNOSIS — D50.9 IRON DEFICIENCY ANEMIA, UNSPECIFIED IRON DEFICIENCY ANEMIA TYPE: ICD-10-CM

## 2017-10-03 DIAGNOSIS — E86.0 DEHYDRATION: Primary | ICD-10-CM

## 2017-10-03 DIAGNOSIS — C79.51 BONY METASTASIS: ICD-10-CM

## 2017-10-03 DIAGNOSIS — R19.7 DIARRHEA, UNSPECIFIED TYPE: ICD-10-CM

## 2017-10-03 DIAGNOSIS — C79.51 BONE METASTASES: ICD-10-CM

## 2017-10-03 PROCEDURE — 63600175 PHARM REV CODE 636 W HCPCS: Mod: PN | Performed by: INTERNAL MEDICINE

## 2017-10-03 PROCEDURE — S0028 INJECTION, FAMOTIDINE, 20 MG: HCPCS | Mod: PN | Performed by: INTERNAL MEDICINE

## 2017-10-03 PROCEDURE — 96367 TX/PROPH/DG ADDL SEQ IV INF: CPT | Mod: PN

## 2017-10-03 PROCEDURE — 96372 THER/PROPH/DIAG INJ SC/IM: CPT | Mod: PN

## 2017-10-03 PROCEDURE — 96374 THER/PROPH/DIAG INJ IV PUSH: CPT | Mod: PN

## 2017-10-03 PROCEDURE — 25000003 PHARM REV CODE 250: Mod: PN | Performed by: INTERNAL MEDICINE

## 2017-10-03 RX ORDER — SODIUM CHLORIDE 0.9 % (FLUSH) 0.9 %
10 SYRINGE (ML) INJECTION
Status: CANCELLED | OUTPATIENT
Start: 2017-10-14

## 2017-10-03 RX ORDER — HEPARIN 100 UNIT/ML
500 SYRINGE INTRAVENOUS
Status: CANCELLED | OUTPATIENT
Start: 2017-10-14

## 2017-10-03 RX ORDER — FAMOTIDINE 20 MG/50ML
20 INJECTION, SOLUTION INTRAVENOUS
Status: COMPLETED | OUTPATIENT
Start: 2017-10-03 | End: 2017-10-03

## 2017-10-03 RX ORDER — FAMOTIDINE 20 MG/50ML
20 INJECTION, SOLUTION INTRAVENOUS
Status: CANCELLED
Start: 2017-10-14 | End: 2017-10-14

## 2017-10-03 RX ORDER — SODIUM CHLORIDE 0.9 % (FLUSH) 0.9 %
10 SYRINGE (ML) INJECTION
Status: DISCONTINUED | OUTPATIENT
Start: 2017-10-03 | End: 2017-10-03 | Stop reason: HOSPADM

## 2017-10-03 RX ADMIN — FAMOTIDINE 20 MG: 20 INJECTION, SOLUTION INTRAVENOUS at 11:10

## 2017-10-03 RX ADMIN — SODIUM CHLORIDE: 900 INJECTION, SOLUTION INTRAVENOUS at 10:10

## 2017-10-03 RX ADMIN — FERUMOXYTOL 510 MG: 510 INJECTION INTRAVENOUS at 11:10

## 2017-10-03 RX ADMIN — SODIUM CHLORIDE 4 MG: 9 INJECTION, SOLUTION INTRAVENOUS at 10:10

## 2017-10-03 RX ADMIN — ERYTHROPOIETIN 40000 UNITS: 40000 INJECTION, SOLUTION INTRAVENOUS; SUBCUTANEOUS at 11:10

## 2017-10-03 NOTE — PLAN OF CARE
Problem: Patient Care Overview  Goal: Discharge Needs Assessment  Adequate for discharge.   Pt tolerated infusion without noted distress.  Reviewed upcoming appointments.  All questions answered.  Ambulated from infusion by self .Tolerated injection without any distress.  Reviewed upcoming appointments.  Amb off unit independently by self.

## 2017-10-06 ENCOUNTER — INFUSION (OUTPATIENT)
Dept: INFUSION THERAPY | Facility: HOSPITAL | Age: 82
End: 2017-10-06
Attending: INTERNAL MEDICINE
Payer: MEDICARE

## 2017-10-06 VITALS
DIASTOLIC BLOOD PRESSURE: 53 MMHG | SYSTOLIC BLOOD PRESSURE: 108 MMHG | OXYGEN SATURATION: 100 % | TEMPERATURE: 99 F | RESPIRATION RATE: 16 BRPM | HEART RATE: 71 BPM

## 2017-10-06 DIAGNOSIS — C79.51 BONY METASTASIS: ICD-10-CM

## 2017-10-06 DIAGNOSIS — D50.9 IRON DEFICIENCY ANEMIA, UNSPECIFIED IRON DEFICIENCY ANEMIA TYPE: Primary | ICD-10-CM

## 2017-10-06 PROCEDURE — 63600175 PHARM REV CODE 636 W HCPCS: Mod: PN | Performed by: INTERNAL MEDICINE

## 2017-10-06 PROCEDURE — 25000003 PHARM REV CODE 250: Mod: PN | Performed by: INTERNAL MEDICINE

## 2017-10-06 PROCEDURE — S0028 INJECTION, FAMOTIDINE, 20 MG: HCPCS | Mod: PN | Performed by: INTERNAL MEDICINE

## 2017-10-06 PROCEDURE — 96367 TX/PROPH/DG ADDL SEQ IV INF: CPT | Mod: PN

## 2017-10-06 PROCEDURE — 96365 THER/PROPH/DIAG IV INF INIT: CPT | Mod: PN

## 2017-10-06 RX ORDER — FAMOTIDINE 20 MG/50ML
20 INJECTION, SOLUTION INTRAVENOUS
Status: CANCELLED
Start: 2017-10-14 | End: 2017-10-14

## 2017-10-06 RX ORDER — SODIUM CHLORIDE 0.9 % (FLUSH) 0.9 %
10 SYRINGE (ML) INJECTION
Status: CANCELLED | OUTPATIENT
Start: 2017-10-14

## 2017-10-06 RX ORDER — FAMOTIDINE 20 MG/50ML
20 INJECTION, SOLUTION INTRAVENOUS
Status: COMPLETED | OUTPATIENT
Start: 2017-10-06 | End: 2017-10-06

## 2017-10-06 RX ORDER — HEPARIN 100 UNIT/ML
500 SYRINGE INTRAVENOUS
Status: CANCELLED | OUTPATIENT
Start: 2017-10-14

## 2017-10-06 RX ADMIN — SODIUM CHLORIDE 4 MG: 9 INJECTION, SOLUTION INTRAVENOUS at 09:10

## 2017-10-06 RX ADMIN — FAMOTIDINE 20 MG: 20 INJECTION, SOLUTION INTRAVENOUS at 10:10

## 2017-10-06 RX ADMIN — DIPHENHYDRAMINE HYDROCHLORIDE 50 MG: 50 INJECTION, SOLUTION INTRAMUSCULAR; INTRAVENOUS at 10:10

## 2017-10-06 RX ADMIN — FERUMOXYTOL 510 MG: 510 INJECTION INTRAVENOUS at 10:10

## 2017-10-06 RX ADMIN — SODIUM CHLORIDE: 900 INJECTION, SOLUTION INTRAVENOUS at 09:10

## 2017-10-06 NOTE — PLAN OF CARE
Problem: Patient Care Overview  Goal: Plan of Care Review  Outcome: Ongoing (interventions implemented as appropriate)  Pt tolerated Feraheme infusion well.  No s/s of infusion reaction noted.  Instructed to call MD with any problems.

## 2017-10-10 ENCOUNTER — INFUSION (OUTPATIENT)
Dept: INFUSION THERAPY | Facility: HOSPITAL | Age: 82
End: 2017-10-10
Attending: INTERNAL MEDICINE
Payer: MEDICARE

## 2017-10-10 VITALS
DIASTOLIC BLOOD PRESSURE: 58 MMHG | HEART RATE: 74 BPM | RESPIRATION RATE: 16 BRPM | OXYGEN SATURATION: 99 % | BODY MASS INDEX: 21.53 KG/M2 | WEIGHT: 153.81 LBS | SYSTOLIC BLOOD PRESSURE: 98 MMHG | HEIGHT: 71 IN | TEMPERATURE: 99 F

## 2017-10-10 DIAGNOSIS — R19.7 DIARRHEA, UNSPECIFIED TYPE: ICD-10-CM

## 2017-10-10 DIAGNOSIS — E86.0 DEHYDRATION: Primary | ICD-10-CM

## 2017-10-10 DIAGNOSIS — C61 PROSTATE CANCER: ICD-10-CM

## 2017-10-10 DIAGNOSIS — C79.51 BONE METASTASES: ICD-10-CM

## 2017-10-10 PROCEDURE — 96372 THER/PROPH/DIAG INJ SC/IM: CPT | Mod: PN

## 2017-10-10 PROCEDURE — 63600175 PHARM REV CODE 636 W HCPCS: Mod: PN,EA | Performed by: INTERNAL MEDICINE

## 2017-10-10 RX ADMIN — ERYTHROPOIETIN 40000 UNITS: 40000 INJECTION, SOLUTION INTRAVENOUS; SUBCUTANEOUS at 11:10

## 2017-10-10 NOTE — PLAN OF CARE
Problem: Patient Care Overview  Goal: Plan of Care Review  Outcome: Ongoing (interventions implemented as appropriate)  Pt tolerated injection well. Pt c/o dizziness at times. Pt states MD aware and pt states taking Antivert. Pt declines for nurse to call MD for visit today. Pt states has labs this week and sees the MD next week. Pt states he wants to wait to see MD next week. D/C to home with steady gait using cane. Pt encouraged to call MD if needed prior to appointment. Pt verbalized understanding.

## 2017-10-10 NOTE — PATIENT INSTRUCTIONS
Preventing Falls in the Home  An adult or child can fall for many reasons. If you are an older adult, you may fall because your reaction time slows down. Your muscles and joints may get stiff, weak, or less flexible because of illness, medicines, or a physical condition. These things can also make a child more likely to fall or be injured in a fall.  Other health problems that make falls more likely include:  · Arthritis  · Dizziness or lightheadedness when you get out of bed (orthostatic hypotension)  · History of a stroke  · Dizziness  · Anemia  · Certain medicines taken for mental illness  · Problems with balance or gait  · History of falls with or without an injury  · Changes in vision (vision impairment)  · Changes in thinking skills and memory (cognitive impairment)  Injuries from a fall can include broken bones, dislocated joints, and cuts. When these injuries are serious enough, they can make it impossible for you or a child who is injured in a fall to live on his or her own.  Prevention tips  To help prevent falls and fall-related injuries, follow the tips below.   Floors  Make floors safer by doing the following:   · Put nonskid pads under area rugs.  · Remove throw rugs.  · Replace worn floor coverings.  · Tack carpets firmly to each step on carpeted stairs. Put nonskid strips on the edges of uncarpeted stairs.  · Keep floors and stairs free of clutter and cords.  · Arrange furniture so there are clear pathways.  · Clean up any spills right away.  · Wear shoes that fit.  Bathrooms    Make bathrooms safer by doing the following:   · Install grab bars in the tub or shower.  · Apply nonskid strips or put a nonskid rubber mat in the tub or shower.  · Sit on a bath chair to bathe.  · Use bathmats with nonskid backing.  Lighting and the environment  Improve lighting in your home by doing the following:   · Keep a flashlight in each room. Or put a lamp next to the bed within easy reach.  · Put nightlights in  the bedrooms, hallways, kitchen, and bathrooms.  · Make sure all stairways have good lighting.  · Take your time when going up and down stairs.  · Put handrails on both sides of stairs and in walkways for more support. To prevent injury to your wrist or arm, dont use handrails to pull yourself up.  · Install grab bars to pull yourself up.  · Move or rearrange items that you use often. This will make them easier to find or reach.  · Look at your home to find any safety hazards. Especially look at doorways, walkways, and the driveway. Remove or repair any safety problems that you find.  Date Last Reviewed: 8/1/2016 © 2000-2017 [employer]. [address] [place] [address], [place] [address]. All rights reserved. This information is not intended as a substitute for professional medical care. Always follow your healthcare professional's instructions.        Preventing Falls: Are You At Risk of Falling?     Ask for help to reduce risk of falling in your home.     As you get older, you're not as steady on your feet as you once were. And you may have health problems you didn't have when you were younger. So, it's not surprising that older people are more likely to trip and fall. Falling can be very serious. It can change your overall health and quality of life. That's why it's important to be aware of your own risk of falling.  The dangers of falling  Falls are one of the main causes of injury in people over age 65. An older person who falls may take longer to get better than a younger person. And, after a fall, an older person is more likely to have problems that don't go away. So, preventing falls can help you avoid serious health problems.  Are you at risk of falling?  Answer these questions to rate your level of risk.  · Are you a woman?  · Have you fallen or stumbled in the last year?  · Are you over age 65?  · Are you ever dizzy or lightheaded with standing?  · Do you have a hard time getting in and out of the  "bathtub or on and off the toilet?  · Do you lean on objects to help you get around? Or do you use a cane or walker?  · Do you have vision or hearing problems? For example, do you need new glasses or hearing aids?  · Do you have 2 or more long-lasting (chronic) medical conditions?  · Do you take 3 or more medicines?  · Have you felt depressed recently?  · Have you had more trouble with your memory in recent months?  · Are there hazards in your home that might cause you to fall, such as loose rugs or poor lighting?  · Do you have a pet that jumps on you or might trip you?  · Have you stopped getting regular exercise?  · Do you have diabetes?   · Do you have a neurologic disease, such as Parkinson or Alzheimer disease?   · Do you drink alcohol?  · Do you wear athletic shoes or slippers, or go barefoot at home?  You can help prevent falls  If you answered "yes" to any of the above questions, you should take steps to reduce your risk of a fall. Monitoring health conditions and keeping walkways in your home free of clutter are just 2 ways. Changing is sometimes easier said than done. But keep in mind that even small changes can make you less likely to fall.  The fear of falling  It's normal to be scared of falling, especially if you've fallen before. But being afraid can actually make you more likely to fall. This is because:  · Fear might cause you to become less active. Being less active can lead to a loss of strength and balance.  · Fear can lead to isolation from others, depression, or the use of more medicines or alcohol. And all these things make falling even more likely.  To break the cycle, learn more about ways to avoid falling. As you take control, you may find yourself feeling less afraid.   Date Last Reviewed: 6/12/2015  © 7682-5555 [employer]. [address] [place] [address], [place] [address]. All rights reserved. This information is not intended as a substitute for professional medical care. Always " follow your healthcare professional's instructions.        Epoetin Nico injection  What is this medicine?  EPOETIN NICO (e CHYNA e tin AL fa) helps your body make more red blood cells. This medicine is used to treat anemia caused by chronic kidney failure, cancer chemotherapy, or HIV-therapy. It may also be used before surgery if you have anemia.  How should I use this medicine?  This medicine is for injection into a vein or under the skin. It is usually given by a health care professional in a hospital or clinic setting.  If you get this medicine at home, you will be taught how to prepare and give this medicine. Use exactly as directed. Take your medicine at regular intervals. Do not take your medicine more often than directed.  It is important that you put your used needles and syringes in a special sharps container. Do not put them in a trash can. If you do not have a sharps container, call your pharmacist or healthcare provider to get one.  Talk to your pediatrician regarding the use of this medicine in children. While this drug may be prescribed for selected conditions, precautions do apply.  What side effects may I notice from receiving this medicine?  Side effects that you should report to your doctor or health care professional as soon as possible:  · allergic reactions like skin rash, itching or hives, swelling of the face, lips, or tongue  · breathing problems  · changes in vision  · chest pain  · confusion, trouble speaking or understanding  · feeling faint or lightheaded, falls  · high blood pressure  · muscle aches or pains  · pain, swelling, warmth in the leg  · rapid weight gain  · severe headaches  · sudden numbness or weakness of the face, arm or leg  · trouble walking, dizziness, loss of balance or coordination  · seizures (convulsions)  · swelling of the ankles, feet, hands  · unusually weak or tired  Side effects that usually do not require medical attention (report to your doctor or health care  professional if they continue or are bothersome):  · diarrhea  · fever, chills (flu-like symptoms)  · headaches  · nausea, vomiting  · redness, stinging, or swelling at site where injected  What may interact with this medicine?  Do not take this medicine with any of the following medications:  · darbepoetin andrew  What if I miss a dose?  If you miss a dose, take it as soon as you can. If it is almost time for your next dose, take only that dose. Do not take double or extra doses.  Where should I keep my medicine?  Keep out of the reach of children.  Store in a refrigerator between 2 and 8 degrees C (36 and 46 degrees F). Do not freeze or shake. Throw away any unused portion if using a single-dose vial. Multi-dose vials can be kept in the refrigerator for up to 21 days after the initial dose. Throw away unused medicine.  What should I tell my health care provider before I take this medicine?  They need to know if you have any of these conditions:  · blood clotting disorders  · cancer patient not on chemotherapy  · cystic fibrosis  · heart disease, such as angina or heart failure  · hemoglobin level of 12 g/dL or greater  · high blood pressure  · low levels of folate, iron, or vitamin B12  · seizures  · an unusual or allergic reaction to erythropoietin, albumin, benzyl alcohol, hamster proteins, other medicines, foods, dyes, or preservatives  · pregnant or trying to get pregnant  · breast-feeding  What should I watch for while using this medicine?  Visit your prescriber or health care professional for regular checks on your progress and for the needed blood tests and blood pressure measurements. It is especially important for the doctor to make sure your hemoglobin level is in the desired range, to limit the risk of potential side effects and to give you the best benefit. Keep all appointments for any recommended tests. Check your blood pressure as directed. Ask your doctor what your blood pressure should be and when  you should contact him or her.  As your body makes more red blood cells, you may need to take iron, folic acid, or vitamin B supplements. Ask your doctor or health care provider which products are right for you. If you have kidney disease continue dietary restrictions, even though this medication can make you feel better. Talk with your doctor or health care professional about the foods you eat and the vitamins that you take.  NOTE:This sheet is a summary. It may not cover all possible information. If you have questions about this medicine, talk to your doctor, pharmacist, or health care provider. Copyright© 2017 [employer]

## 2017-10-12 ENCOUNTER — LAB VISIT (OUTPATIENT)
Dept: LAB | Facility: HOSPITAL | Age: 82
End: 2017-10-12
Attending: INTERNAL MEDICINE
Payer: MEDICARE

## 2017-10-12 DIAGNOSIS — D64.81 ANEMIA DUE TO ANTINEOPLASTIC CHEMOTHERAPY: ICD-10-CM

## 2017-10-12 DIAGNOSIS — T45.1X5A ANEMIA DUE TO ANTINEOPLASTIC CHEMOTHERAPY: ICD-10-CM

## 2017-10-12 DIAGNOSIS — R91.8 PULMONARY NODULES: ICD-10-CM

## 2017-10-12 DIAGNOSIS — C79.51 BONE METASTASES: ICD-10-CM

## 2017-10-12 DIAGNOSIS — C61 PROSTATE CANCER: ICD-10-CM

## 2017-10-12 LAB
ALBUMIN SERPL BCP-MCNC: 3.4 G/DL
ALP SERPL-CCNC: 52 U/L
ALT SERPL W/O P-5'-P-CCNC: 22 U/L
ANION GAP SERPL CALC-SCNC: 6 MMOL/L
AST SERPL-CCNC: 21 U/L
BASOPHILS # BLD AUTO: 0.03 K/UL
BASOPHILS NFR BLD: 0.6 %
BILIRUB SERPL-MCNC: 0.5 MG/DL
BUN SERPL-MCNC: 25 MG/DL
CALCIUM SERPL-MCNC: 9.7 MG/DL
CHLORIDE SERPL-SCNC: 109 MMOL/L
CO2 SERPL-SCNC: 26 MMOL/L
COMPLEXED PSA SERPL-MCNC: 0.07 NG/ML
CREAT SERPL-MCNC: 1 MG/DL
DIFFERENTIAL METHOD: ABNORMAL
EOSINOPHIL # BLD AUTO: 0.1 K/UL
EOSINOPHIL NFR BLD: 1.2 %
ERYTHROCYTE [DISTWIDTH] IN BLOOD BY AUTOMATED COUNT: 18.5 %
EST. GFR  (AFRICAN AMERICAN): >60 ML/MIN/1.73 M^2
EST. GFR  (NON AFRICAN AMERICAN): >60 ML/MIN/1.73 M^2
GLUCOSE SERPL-MCNC: 169 MG/DL
HCT VFR BLD AUTO: 36.1 %
HGB BLD-MCNC: 11.3 G/DL
LDH SERPL L TO P-CCNC: 529 U/L
LYMPHOCYTES # BLD AUTO: 0.8 K/UL
LYMPHOCYTES NFR BLD: 16.1 %
MAGNESIUM SERPL-MCNC: 1.9 MG/DL
MCH RBC QN AUTO: 32.2 PG
MCHC RBC AUTO-ENTMCNC: 31.3 G/DL
MCV RBC AUTO: 103 FL
MONOCYTES # BLD AUTO: 0.3 K/UL
MONOCYTES NFR BLD: 5.7 %
NEUTROPHILS # BLD AUTO: 3.8 K/UL
NEUTROPHILS NFR BLD: 76.4 %
NRBC BLD-RTO: 0 /100 WBC
PLATELET # BLD AUTO: 121 K/UL
PMV BLD AUTO: 11.8 FL
POTASSIUM SERPL-SCNC: 3.9 MMOL/L
PROT SERPL-MCNC: 5.7 G/DL
RBC # BLD AUTO: 3.51 M/UL
SODIUM SERPL-SCNC: 141 MMOL/L
WBC # BLD AUTO: 4.91 K/UL

## 2017-10-12 PROCEDURE — 83615 LACTATE (LD) (LDH) ENZYME: CPT

## 2017-10-12 PROCEDURE — 85025 COMPLETE CBC W/AUTO DIFF WBC: CPT

## 2017-10-12 PROCEDURE — 84153 ASSAY OF PSA TOTAL: CPT

## 2017-10-12 PROCEDURE — 83735 ASSAY OF MAGNESIUM: CPT | Mod: PN

## 2017-10-12 PROCEDURE — 80053 COMPREHEN METABOLIC PANEL: CPT | Mod: PN

## 2017-10-12 PROCEDURE — 36415 COLL VENOUS BLD VENIPUNCTURE: CPT | Mod: PN

## 2017-10-12 PROCEDURE — 83615 LACTATE (LD) (LDH) ENZYME: CPT | Mod: PN

## 2017-10-12 PROCEDURE — 85025 COMPLETE CBC W/AUTO DIFF WBC: CPT | Mod: PN

## 2017-10-12 PROCEDURE — 80053 COMPREHEN METABOLIC PANEL: CPT

## 2017-10-12 PROCEDURE — 83735 ASSAY OF MAGNESIUM: CPT

## 2017-10-12 PROCEDURE — 84153 ASSAY OF PSA TOTAL: CPT | Mod: PN

## 2017-10-17 ENCOUNTER — OFFICE VISIT (OUTPATIENT)
Dept: HEMATOLOGY/ONCOLOGY | Facility: CLINIC | Age: 82
End: 2017-10-17
Payer: MEDICARE

## 2017-10-17 ENCOUNTER — TELEPHONE (OUTPATIENT)
Dept: INFUSION THERAPY | Facility: HOSPITAL | Age: 82
End: 2017-10-17

## 2017-10-17 ENCOUNTER — TELEPHONE (OUTPATIENT)
Dept: HEMATOLOGY/ONCOLOGY | Facility: CLINIC | Age: 82
End: 2017-10-17

## 2017-10-17 ENCOUNTER — INFUSION (OUTPATIENT)
Dept: INFUSION THERAPY | Facility: HOSPITAL | Age: 82
End: 2017-10-17
Attending: INTERNAL MEDICINE
Payer: MEDICARE

## 2017-10-17 VITALS
RESPIRATION RATE: 18 BRPM | TEMPERATURE: 97 F | HEART RATE: 69 BPM | HEIGHT: 71 IN | SYSTOLIC BLOOD PRESSURE: 149 MMHG | DIASTOLIC BLOOD PRESSURE: 69 MMHG | WEIGHT: 151.25 LBS | BODY MASS INDEX: 21.18 KG/M2

## 2017-10-17 VITALS
RESPIRATION RATE: 18 BRPM | BODY MASS INDEX: 21.18 KG/M2 | WEIGHT: 151.25 LBS | TEMPERATURE: 97 F | HEIGHT: 71 IN | HEART RATE: 69 BPM | DIASTOLIC BLOOD PRESSURE: 69 MMHG | SYSTOLIC BLOOD PRESSURE: 149 MMHG

## 2017-10-17 DIAGNOSIS — E86.0 DEHYDRATION: Primary | ICD-10-CM

## 2017-10-17 DIAGNOSIS — T45.1X5A ANEMIA DUE TO ANTINEOPLASTIC CHEMOTHERAPY: ICD-10-CM

## 2017-10-17 DIAGNOSIS — C61 PROSTATE CANCER: ICD-10-CM

## 2017-10-17 DIAGNOSIS — R19.7 DIARRHEA, UNSPECIFIED TYPE: ICD-10-CM

## 2017-10-17 DIAGNOSIS — R91.8 PULMONARY NODULES: ICD-10-CM

## 2017-10-17 DIAGNOSIS — D69.59 CHEMOTHERAPY-INDUCED THROMBOCYTOPENIA: ICD-10-CM

## 2017-10-17 DIAGNOSIS — C79.51 BONE METASTASES: ICD-10-CM

## 2017-10-17 DIAGNOSIS — D64.81 ANEMIA DUE TO ANTINEOPLASTIC CHEMOTHERAPY: ICD-10-CM

## 2017-10-17 DIAGNOSIS — T45.1X5A CHEMOTHERAPY-INDUCED THROMBOCYTOPENIA: ICD-10-CM

## 2017-10-17 DIAGNOSIS — C61 PROSTATE CANCER: Primary | ICD-10-CM

## 2017-10-17 PROCEDURE — 99999 PR PBB SHADOW E&M-EST. PATIENT-LVL III: CPT | Mod: PBBFAC,,, | Performed by: INTERNAL MEDICINE

## 2017-10-17 PROCEDURE — 63600175 PHARM REV CODE 636 W HCPCS: Mod: PN | Performed by: INTERNAL MEDICINE

## 2017-10-17 PROCEDURE — 99214 OFFICE O/P EST MOD 30 MIN: CPT | Mod: S$GLB,,, | Performed by: INTERNAL MEDICINE

## 2017-10-17 PROCEDURE — 96401 CHEMO ANTI-NEOPL SQ/IM: CPT | Mod: PN

## 2017-10-17 RX ADMIN — DENOSUMAB 120 MG: 120 INJECTION SUBCUTANEOUS at 11:10

## 2017-10-17 NOTE — TELEPHONE ENCOUNTER
[10/17/2017 11:28 AM] Terri Moseley:   mike alva is due for xgeva but betsy did not sign are we giving the xgeva today?  [10/17/2017 11:33 AM] Kimberly Carreno:   he said he added it to they cycle and it is signed  Xgeva, yes

## 2017-10-17 NOTE — PATIENT INSTRUCTIONS
Denosumab injection  What is this medicine?  DENOSUMAB (den oh zenia mab) slows bone breakdown. Prolia is used to treat osteoporosis in women after menopause and in men. Xgeva is used to prevent bone fractures and other bone problems caused by cancer bone metastases. Xgeva is also used to treat giant cell tumor of the bone.  How should I use this medicine?  This medicine is for injection under the skin. It is given by a health care professional in a hospital or clinic setting.  If you are getting Prolia, a special MedGuide will be given to you by the pharmacist with each prescription and refill. Be sure to read this information carefully each time.  For Prolia, talk to your pediatrician regarding the use of this medicine in children. Special care may be needed. For Xgeva, talk to your pediatrician regarding the use of this medicine in children. While this drug may be prescribed for children as young as 13 years for selected conditions, precautions do apply.  What side effects may I notice from receiving this medicine?  Side effects that you should report to your doctor or health care professional as soon as possible:  · allergic reactions like skin rash, itching or hives, swelling of the face, lips, or tongue  · breathing problems  · chest pain  · fast, irregular heartbeat  · feeling faint or lightheaded, falls  · fever, chills, or any other sign of infection  · muscle spasms, tightening, or twitches  · numbness or tingling  · skin blisters or bumps, or is dry, peels, or red  · slow healing or unexplained pain in the mouth or jaw  · unusual bleeding or bruising  Side effects that usually do not require medical attention (Report these to your doctor or health care professional if they continue or are bothersome.):  · muscle pain  · stomach upset, gas  What may interact with this medicine?  Do not take this medicine with any of the following medications:  · other medicines containing denosumab  This medicine may also  interact with the following medications:  · medicines that suppress the immune system  · medicines that treat cancer  · steroid medicines like prednisone or cortisone  What if I miss a dose?  It is important not to miss your dose. Call your doctor or health care professional if you are unable to keep an appointment.  Where should I keep my medicine?  This medicine is only given in a clinic, doctor's office, or other health care setting and will not be stored at home.  What should I tell my health care provider before I take this medicine?  They need to know if you have any of these conditions:  · dental disease  · eczema  · infection or history of infections  · kidney disease or on dialysis  · low blood calcium or vitamin D  · malabsorption syndrome  · scheduled to have surgery or tooth extraction  · taking medicine that contains denosumab  · thyroid or parathyroid disease  · an unusual reaction to denosumab, other medicines, foods, dyes, or preservatives  · pregnant or trying to get pregnant  · breast-feeding  What should I watch for while using this medicine?  Visit your doctor or health care professional for regular checks on your progress. Your doctor or health care professional may order blood tests and other tests to see how you are doing.  Call your doctor or health care professional if you get a cold or other infection while receiving this medicine. Do not treat yourself. This medicine may decrease your body's ability to fight infection.  You should make sure you get enough calcium and vitamin D while you are taking this medicine, unless your doctor tells you not to. Discuss the foods you eat and the vitamins you take with your health care professional.  See your dentist regularly. Brush and floss your teeth as directed. Before you have any dental work done, tell your dentist you are receiving this medicine.  Do not become pregnant while taking this medicine or for 5 months after stopping it. Women should  inform their doctor if they wish to become pregnant or think they might be pregnant. There is a potential for serious side effects to an unborn child. Talk to your health care professional or pharmacist for more information.  NOTE:This sheet is a summary. It may not cover all possible information. If you have questions about this medicine, talk to your doctor, pharmacist, or health care provider. Copyright© 2017 [employer]        Preventing Falls: Are You At Risk of Falling?     Ask for help to reduce risk of falling in your home.     As you get older, you're not as steady on your feet as you once were. And you may have health problems you didn't have when you were younger. So, it's not surprising that older people are more likely to trip and fall. Falling can be very serious. It can change your overall health and quality of life. That's why it's important to be aware of your own risk of falling.  The dangers of falling  Falls are one of the main causes of injury in people over age 65. An older person who falls may take longer to get better than a younger person. And, after a fall, an older person is more likely to have problems that don't go away. So, preventing falls can help you avoid serious health problems.  Are you at risk of falling?  Answer these questions to rate your level of risk.  · Are you a woman?  · Have you fallen or stumbled in the last year?  · Are you over age 65?  · Are you ever dizzy or lightheaded with standing?  · Do you have a hard time getting in and out of the bathtub or on and off the toilet?  · Do you lean on objects to help you get around? Or do you use a cane or walker?  · Do you have vision or hearing problems? For example, do you need new glasses or hearing aids?  · Do you have 2 or more long-lasting (chronic) medical conditions?  · Do you take 3 or more medicines?  · Have you felt depressed recently?  · Have you had more trouble with your memory in recent months?  · Are there  "hazards in your home that might cause you to fall, such as loose rugs or poor lighting?  · Do you have a pet that jumps on you or might trip you?  · Have you stopped getting regular exercise?  · Do you have diabetes?   · Do you have a neurologic disease, such as Parkinson or Alzheimer disease?   · Do you drink alcohol?  · Do you wear athletic shoes or slippers, or go barefoot at home?  You can help prevent falls  If you answered "yes" to any of the above questions, you should take steps to reduce your risk of a fall. Monitoring health conditions and keeping walkways in your home free of clutter are just 2 ways. Changing is sometimes easier said than done. But keep in mind that even small changes can make you less likely to fall.  The fear of falling  It's normal to be scared of falling, especially if you've fallen before. But being afraid can actually make you more likely to fall. This is because:  · Fear might cause you to become less active. Being less active can lead to a loss of strength and balance.  · Fear can lead to isolation from others, depression, or the use of more medicines or alcohol. And all these things make falling even more likely.  To break the cycle, learn more about ways to avoid falling. As you take control, you may find yourself feeling less afraid.   Date Last Reviewed: 6/12/2015  © 8416-9912 [employer]. [address] [place] [address]. All rights reserved. This information is not intended as a substitute for professional medical care. Always follow your healthcare professional's instructions.        Exercises to Prevent Falls  Certain types of exercises may help make you less likely to fall. Try the ones below. Or do other exercises that your health care provider suggests. Depending on your health, you may need to start slowly. Don't let that stop you. Even small amounts of exercise can help you. Be sure to talk to your health care provider before starting any " "exercise program.       Improve balance  Many types of exercise can help improve balance. Mayo chi and yoga are good examples. Here's another one to try. You can do it anytime and almost anywhere.  · Stand next to a counter or solid support.  · Push yourself up onto your tiptoes.  · Hold for 5 seconds. If you start to lose your balance, hold on to the counter.  · Rest and repeat 5 times. Work up to holding for 20 to 30 seconds, if you can. Increase flexibility  Being more flexible makes it easier for you to move around safely. Try exercises like the seated hamstring stretch.  · Sit in a chair and put one foot on a stool.  · Straighten your leg and reach with both hands down either side of your leg. Reach as far down your leg as you can.  · Hold for about 20 seconds.  · Go back to the starting position. Then repeat 5 times. Switch legs. Build strength  "Resistance" exercises help build strength. You can do them without equipment. Or you can use weights, elastic bands, or special machines. One such exercise is called the biceps curl. You can hold a 1-pound weight or even a can of soup. Do this exercise at least 3 times a week. Strive for every day.  · Sit up straight in a chair.  · Keep your elbow close to your body and your wrist straight.  · Bend your arm, moving your hand up to your shoulder. Then slowly lower your arm.  · Repeat 5 times. Switch to the other arm.   Build your staying power  Aerobic exercises make your heart and lungs stronger so you can keep moving longer. Walking and swimming are two of the best types of exercises you can do. Using a stationary bike is great, too. Find an aerobic exercise that you enjoy. Start slowly and build up. Even 5 minutes is helpful. Aim for a goal of 30 minutes, at least 3 times a week. You don't have to do 30 minutes in 1 session. Break it up and walk a little throughout the day.  More helpful tips  · Start easy. Slowly work up to doing more.  · Talk with your health " care provider about the best exercises for you.  · Call senior centers or health clubs about exercise programs.  · If needed, have a family member watch you walk every so often to check your stability.  · Exercise with a friend. Choose an activity you both enjoy.  · Consider lyndsey chi or yoga to strengthen your balance.  · Try exercises that you can do anytime, anywhere. Here are 2 examples. Have someone with you when you first try these:  ¨ Practice walking by placing 1 foot right in front of the other.  ¨ Stand up and sit down 10 times. Repeat this throughout the day.   Date Last Reviewed: 6/13/2015  © 1204-5512 [employer]. [address] [place] [address], [place] [address]. All rights reserved. This information is not intended as a substitute for professional medical care. Always follow your healthcare professional's instructions.        Preventing Falls: How to Prepare and What to Do    Falling is not something you want to think about. But it can make a big difference to plan ahead. If you're prepared, you'll know how to get help. And you'll be less likely to panic if you fall. This means you'll be able to do what's needed to get help right away.  How to prepare  · Have someone check on you daily.  · Keep a list of emergency numbers near the phone.  · Always have a way to call for help. Keep a cell phone with you at all times. Or talk with your healthcare provider about how to set up a home monitoring service. This involves wearing a small device around your neck or wrist. If you fall, you can press the button on the device. This alerts emergency responders.  · Talk with your healthcare provider about an exercise program that's right for you. Regular exercise may reduce the risk of falling and the risk for injury related to a fall.  · It's important to have good lighting in your home. Avoid using throw rugs, because they can raise your risk of tripping and falling. Add grab bars in the bathroom to help  reduce the risk of falling. Small changes can make your home safer. Talk with your healthcare provider about making your home safer.  What to do if you fall  Above all, try to stay calm:  · If you start to fall, try to relax your body. This will reduce the impact of the fall.  · After you fall, press your monitor button, or phone for help.  · Don't rush to get up. First, make sure you're not hurt.  · Roll onto your side, then crawl to a chair. Pull yourself up onto the chair slowly.  · You should be checked if you struck your head, lost consciousness, were confused afterward, or have any other concerns for injury.  · Be sure to tell your doctor that you fell.  A note to family and friends  If you're with a loved one when he or she starts to fall, don't try to stop the fall. Ease the person to the floor carefully, so neither of you gets hurt. Don't leave the person alone. And don't try to move him or her. Put a pillow under his or her head. Check for injuries. If help is needed right away, be sure to call 911.   Date Last Reviewed: 6/13/2015  © 2751-5615 [employer]. [address] [place] [address], [place] [address]. All rights reserved. This information is not intended as a substitute for professional medical care. Always follow your healthcare professional's instructions.

## 2017-10-17 NOTE — PROGRESS NOTES
Date of Service: 10/17/2017  HISTORY OF PRESENT ILLNESS:  The patient is an 82-year-old white gentleman well   known to me for metastatic prostate carcinoma involving bone, as well as   stable/indeterminate pulmonary nodules, which were imaged last month.  The   patient returns to clinic for evaluation prior to next cycle of   abiraterone/prednisone.  His course has been complicated by treatment-associated   anemia, which required transfusion.  He also receives quarterly Xgeva for   prevention of skeletal adverse event.  This is due with this cycle of therapy.    No other complaints or pertinent findings on a 14-point review of systems.    PHYSICAL EXAMINATION:  GENERAL:  Well-developed, elderly, thin-appearing, white gentleman in no acute   distress.  VITAL SIGNS:  Weight of 151 pounds (decreased by 3 pounds).   HEENT:  Normocephalic, atraumatic.  Oral mucosa pink and moist.  Lips without   lesions.  Tongue midline.  Oropharynx clear.  Nonicteric sclerae.   NECK:  Supple, no adenopathy.  No carotid bruits, thyromegaly or thyroid nodule.                                                                        HEART:  Regular rate and rhythm without murmur, gallop or rub.                LUNGS:  Clear to auscultation bilaterally.  Normal respiratory effort.       ABDOMEN:  Soft, nontender, nondistended with positive normoactive bowel sounds,   no hepatosplenomegaly.    EXTREMITIES:  No cyanosis, clubbing or edema.  Distal pulses are intact.                                              AXILLAE AND GROIN:  No palpable pathologic lymphadenopathy is appreciated.        SKIN:  Intact/turgor normal                                                              NEUROLOGIC:  Cranial nerves II-XII grossly intact.  Motor:  Good muscle bulk and   tone.  Strength/sensory 5/5 throughout.  Gait stable.      LABORATORY:  White count 4.9, H and H 11.3 and 36.1 following transfusion,   platelets are 121.  Sodium 141, potassium 3.9,  chloride 109, CO2 26, BUN 25,   creatinine 1, glucose 169, calcium 9.7, mag 1.9.  Liver function tests are   within normal limits.  LDH is 529.  GFR is greater than 60.  PSA is 0.07, down   from 0.17.    IMPRESSION:  1.  Metastatic prostate carcinoma involving bone with continued response to   abiraterone/prednisone.  2.  Stable/indeterminate pulmonary nodules.  3.  Treatment-associated anemia, better palliated following transfusion.  4.  Treatment-associated thrombocytopenia -- stable.    PLAN:  1.  Proceed with 27th cycle of therapy to consist of abiraterone 1000 mg p.o.   daily and prednisone 10 mg p.o. daily, days 1 through 28.  2.  Continue Procrit 40,000 units subcutaneously per week for the next four   weeks in order to prevent further difficulties with anemia or need of recurrent   outside repeat transfusion.  3.  Repeat Xgeva 120 subQ.  4.  Return in four weeks with interval CBC, CMP, LDH, mag, and PSA prior to   appointment.      FADUMO/HN  dd: 10/17/2017 11:16:40 (CDT)  td: 10/18/2017 01:23:12 (CDT)  Doc ID   #[id number]  Job ID #[id number]    CC:

## 2017-10-24 ENCOUNTER — INFUSION (OUTPATIENT)
Dept: INFUSION THERAPY | Facility: HOSPITAL | Age: 82
End: 2017-10-24
Attending: INTERNAL MEDICINE
Payer: MEDICARE

## 2017-10-24 VITALS
HEIGHT: 71 IN | SYSTOLIC BLOOD PRESSURE: 118 MMHG | OXYGEN SATURATION: 97 % | TEMPERATURE: 98 F | BODY MASS INDEX: 21.02 KG/M2 | DIASTOLIC BLOOD PRESSURE: 72 MMHG | WEIGHT: 150.19 LBS | HEART RATE: 77 BPM | RESPIRATION RATE: 18 BRPM

## 2017-10-24 DIAGNOSIS — E86.0 DEHYDRATION: Primary | ICD-10-CM

## 2017-10-24 DIAGNOSIS — R19.7 DIARRHEA, UNSPECIFIED TYPE: ICD-10-CM

## 2017-10-24 DIAGNOSIS — C79.51 BONE METASTASES: ICD-10-CM

## 2017-10-24 DIAGNOSIS — C61 PROSTATE CANCER: ICD-10-CM

## 2017-10-24 PROCEDURE — 63600175 PHARM REV CODE 636 W HCPCS: Mod: PN | Performed by: INTERNAL MEDICINE

## 2017-10-24 PROCEDURE — 96372 THER/PROPH/DIAG INJ SC/IM: CPT | Mod: PN

## 2017-10-24 RX ADMIN — ERYTHROPOIETIN 40000 UNITS: 40000 INJECTION, SOLUTION INTRAVENOUS; SUBCUTANEOUS at 11:10

## 2017-10-24 NOTE — NURSING
Pt hgb prior to transfusion on 9-12-17=8.5, post transfusion on 10-12-17 hgb=11.3. Per Md note on 10-17-17 give procrit x 4 doses. Procrit given today.

## 2017-10-24 NOTE — PLAN OF CARE
Problem: Patient Care Overview  Goal: Plan of Care Review  Outcome: Ongoing (interventions implemented as appropriate)  Pt tolerated injection well

## 2017-10-31 ENCOUNTER — INFUSION (OUTPATIENT)
Dept: INFUSION THERAPY | Facility: HOSPITAL | Age: 82
End: 2017-10-31
Attending: INTERNAL MEDICINE
Payer: MEDICARE

## 2017-10-31 VITALS
SYSTOLIC BLOOD PRESSURE: 155 MMHG | HEART RATE: 75 BPM | DIASTOLIC BLOOD PRESSURE: 67 MMHG | RESPIRATION RATE: 17 BRPM | TEMPERATURE: 98 F

## 2017-10-31 DIAGNOSIS — C61 PROSTATE CANCER: ICD-10-CM

## 2017-10-31 DIAGNOSIS — C79.51 BONE METASTASES: ICD-10-CM

## 2017-10-31 DIAGNOSIS — R19.7 DIARRHEA, UNSPECIFIED TYPE: ICD-10-CM

## 2017-10-31 DIAGNOSIS — E86.0 DEHYDRATION: Primary | ICD-10-CM

## 2017-10-31 PROCEDURE — 96372 THER/PROPH/DIAG INJ SC/IM: CPT | Mod: PN

## 2017-10-31 PROCEDURE — 63600175 PHARM REV CODE 636 W HCPCS: Mod: PN | Performed by: INTERNAL MEDICINE

## 2017-10-31 RX ADMIN — ERYTHROPOIETIN 40000 UNITS: 40000 INJECTION, SOLUTION INTRAVENOUS; SUBCUTANEOUS at 11:10

## 2017-11-07 ENCOUNTER — INFUSION (OUTPATIENT)
Dept: INFUSION THERAPY | Facility: HOSPITAL | Age: 82
End: 2017-11-07
Attending: INTERNAL MEDICINE
Payer: MEDICARE

## 2017-11-07 VITALS
WEIGHT: 151.38 LBS | BODY MASS INDEX: 21.19 KG/M2 | SYSTOLIC BLOOD PRESSURE: 136 MMHG | HEART RATE: 78 BPM | RESPIRATION RATE: 16 BRPM | TEMPERATURE: 98 F | HEIGHT: 71 IN | DIASTOLIC BLOOD PRESSURE: 63 MMHG

## 2017-11-07 DIAGNOSIS — E86.0 DEHYDRATION: Primary | ICD-10-CM

## 2017-11-07 DIAGNOSIS — C61 PROSTATE CANCER: ICD-10-CM

## 2017-11-07 DIAGNOSIS — R19.7 DIARRHEA, UNSPECIFIED TYPE: ICD-10-CM

## 2017-11-07 DIAGNOSIS — C79.51 BONE METASTASES: ICD-10-CM

## 2017-11-07 DIAGNOSIS — E86.0 DEHYDRATION: ICD-10-CM

## 2017-11-07 PROCEDURE — 96372 THER/PROPH/DIAG INJ SC/IM: CPT | Mod: PN

## 2017-11-07 PROCEDURE — 63600175 PHARM REV CODE 636 W HCPCS: Mod: PN | Performed by: INTERNAL MEDICINE

## 2017-11-07 RX ORDER — ABIRATERONE ACETATE 250 MG/1
1000 TABLET ORAL DAILY
Qty: 112 TABLET | Refills: 2 | Status: SHIPPED | OUTPATIENT
Start: 2017-11-07 | End: 2017-12-27 | Stop reason: SDUPTHER

## 2017-11-07 RX ORDER — PREDNISONE 10 MG/1
10 TABLET ORAL DAILY
Qty: 30 TABLET | Refills: 0 | Status: SHIPPED | OUTPATIENT
Start: 2017-11-07 | End: 2018-01-02 | Stop reason: SDUPTHER

## 2017-11-07 RX ADMIN — ERYTHROPOIETIN 40000 UNITS: 40000 INJECTION, SOLUTION INTRAVENOUS; SUBCUTANEOUS at 10:11

## 2017-11-08 ENCOUNTER — TELEPHONE (OUTPATIENT)
Dept: PHARMACY | Facility: CLINIC | Age: 82
End: 2017-11-08

## 2017-11-08 NOTE — TELEPHONE ENCOUNTER
[facility] received prescription for Zytiga 1000mg daily and prednisone 5mg twice daily.  Prior authorization is required.

## 2017-11-10 ENCOUNTER — TELEPHONE (OUTPATIENT)
Dept: PHARMACY | Facility: CLINIC | Age: 82
End: 2017-11-10

## 2017-11-15 ENCOUNTER — OFFICE VISIT (OUTPATIENT)
Dept: HEMATOLOGY/ONCOLOGY | Facility: CLINIC | Age: 82
End: 2017-11-15
Payer: MEDICARE

## 2017-11-15 VITALS
TEMPERATURE: 98 F | SYSTOLIC BLOOD PRESSURE: 105 MMHG | HEART RATE: 67 BPM | RESPIRATION RATE: 20 BRPM | BODY MASS INDEX: 21.4 KG/M2 | WEIGHT: 152.88 LBS | HEIGHT: 71 IN | DIASTOLIC BLOOD PRESSURE: 58 MMHG

## 2017-11-15 DIAGNOSIS — R91.8 PULMONARY NODULES: ICD-10-CM

## 2017-11-15 DIAGNOSIS — T45.1X5A ANEMIA DUE TO ANTINEOPLASTIC CHEMOTHERAPY: ICD-10-CM

## 2017-11-15 DIAGNOSIS — D69.59 CHEMOTHERAPY-INDUCED THROMBOCYTOPENIA: ICD-10-CM

## 2017-11-15 DIAGNOSIS — T45.1X5A CHEMOTHERAPY-INDUCED THROMBOCYTOPENIA: ICD-10-CM

## 2017-11-15 DIAGNOSIS — C61 PROSTATE CANCER: Primary | ICD-10-CM

## 2017-11-15 DIAGNOSIS — C79.51 BONE METASTASES: ICD-10-CM

## 2017-11-15 DIAGNOSIS — D64.81 ANEMIA DUE TO ANTINEOPLASTIC CHEMOTHERAPY: ICD-10-CM

## 2017-11-15 PROCEDURE — 99999 PR PBB SHADOW E&M-EST. PATIENT-LVL III: CPT | Mod: PBBFAC,,, | Performed by: INTERNAL MEDICINE

## 2017-11-15 PROCEDURE — 99214 OFFICE O/P EST MOD 30 MIN: CPT | Mod: S$GLB,,, | Performed by: INTERNAL MEDICINE

## 2017-11-15 NOTE — PROGRESS NOTES
Date of Service: 11/15/2017  The patient is an 82-year-old gentleman well known to me for metastatic prostate   carcinoma involving bone.  He has had a good response to   abiraterone/prednisone.  He is also followed for stable/indeterminate pulmonary   nodules, which were unchanged from the most recent CT scan, treatment-associated   anemia, and treatment-associated thrombocytopenia.  He returns to clinic for   evaluation prior to 28th cycle of therapy.  He has intermittent difficulties   with productive cough, but other than this, no complaints or pertinent findings   on a 14-point review of systems.    PHYSICAL EXAMINATION:  GENERAL:  Well-developed, elderly, thin-appearing, white gentleman in no acute   distress.  VITAL SIGNS:  Weight of 152-1/2 pounds (increased by 1-1/2 pounds).  HEENT:  Normocephalic, atraumatic.  Oral mucosa pink and moist.  Lips without   lesions.  Tongue midline.  Oropharynx clear.  Nonicteric sclerae.   NECK:  Supple, no adenopathy.  No carotid bruits, thyromegaly or thyroid nodule.                                                                        HEART:  Regular rate and rhythm without murmur, gallop or rub.                LUNGS:  Clear to auscultation bilaterally.  Normal respiratory effort.       ABDOMEN:  Soft, nontender, nondistended with positive normoactive bowel sounds,   no hepatosplenomegaly.    EXTREMITIES:  No cyanosis, clubbing or edema.  Distal pulses are intact.                                              AXILLAE AND GROIN:  No palpable pathologic lymphadenopathy is appreciated.        SKIN:  Intact/turgor normal                                                              NEUROLOGIC:  Cranial nerves II-XII grossly intact.  Motor:  Good muscle bulk and   tone.  Strength/sensory 5/5 throughout.  Gait stable.     LABORATORY DATA:  White count 6, H and H 13.7 and 42.7, and platelets of 123.    Sodium 136, potassium 4.6, chloride 105, CO2 24, BUN 34, creatinine 1.1,  glucose   157, calcium 9.5, and mag 2.  Liver function tests are otherwise within normal   limits.  .  PSA stable at 0.11.    IMPRESSION:  1.  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  2.  Stable/indeterminate pulmonary nodules.  3.  Treatment-associated anemia - improved and not in need of transfusion or   Procrit at this time.  4.  Treatment-associated thrombocytopenia, stable/asymptomatic.    PLAN:  1.  Proceed with 28th cycle of therapy to consist of 1000 mg of abiraterone   daily and prednisone 10 mg daily, days 1 through 28.  2.  Hold Procrit.  3.  Return to clinic in four weeks with interval CBC, CMP, LDH, mag, PSA, total   body bone scan, and CT scan of the abdomen and pelvis for restaging.      FADUMO/HN  dd: 11/15/2017 14:22:49 (CST)  td: 11/16/2017 00:22:03 (CST)  Doc ID   #[id number]  Job ID #[id number]    CC:

## 2017-11-20 ENCOUNTER — TELEPHONE (OUTPATIENT)
Dept: UROLOGY | Facility: CLINIC | Age: 82
End: 2017-11-20

## 2017-11-20 NOTE — TELEPHONE ENCOUNTER
----- Message from Kailey Murray sent at 11/20/2017  9:32 AM CST -----  Contact: Patient  Patient needs to speak with someone regarding his upcoming appointment on 11/22.  Call Back#[phone]  Thanks

## 2017-11-21 ENCOUNTER — TELEPHONE (OUTPATIENT)
Dept: HEMATOLOGY/ONCOLOGY | Facility: CLINIC | Age: 82
End: 2017-11-21

## 2017-12-11 ENCOUNTER — HOSPITAL ENCOUNTER (OUTPATIENT)
Dept: RADIOLOGY | Facility: HOSPITAL | Age: 82
Discharge: HOME | End: 2017-12-11
Attending: INTERNAL MEDICINE
Payer: MEDICARE

## 2017-12-11 DIAGNOSIS — C79.51 BONE METASTASES: ICD-10-CM

## 2017-12-11 DIAGNOSIS — R91.8 PULMONARY NODULES: ICD-10-CM

## 2017-12-11 DIAGNOSIS — T45.1X5A CHEMOTHERAPY-INDUCED THROMBOCYTOPENIA: ICD-10-CM

## 2017-12-11 DIAGNOSIS — D64.81 ANEMIA DUE TO ANTINEOPLASTIC CHEMOTHERAPY: ICD-10-CM

## 2017-12-11 DIAGNOSIS — D69.59 CHEMOTHERAPY-INDUCED THROMBOCYTOPENIA: ICD-10-CM

## 2017-12-11 DIAGNOSIS — T45.1X5A ANEMIA DUE TO ANTINEOPLASTIC CHEMOTHERAPY: ICD-10-CM

## 2017-12-11 DIAGNOSIS — C61 PROSTATE CANCER: ICD-10-CM

## 2017-12-11 PROCEDURE — 74177 CT ABD & PELVIS W/CONTRAST: CPT | Mod: TC,PO

## 2017-12-11 PROCEDURE — 74177 CT ABD & PELVIS W/CONTRAST: CPT | Mod: 26,,, | Performed by: RADIOLOGY

## 2017-12-11 PROCEDURE — 25500020 PHARM REV CODE 255: Mod: PO | Performed by: INTERNAL MEDICINE

## 2017-12-11 PROCEDURE — 78306 BONE IMAGING WHOLE BODY: CPT | Mod: 26,,, | Performed by: RADIOLOGY

## 2017-12-11 PROCEDURE — A9503 TC99M MEDRONATE: HCPCS | Mod: PO

## 2017-12-11 RX ADMIN — IOHEXOL 30 ML: 350 INJECTION, SOLUTION INTRAVENOUS at 12:12

## 2017-12-11 RX ADMIN — IOHEXOL 75 ML: 350 INJECTION, SOLUTION INTRAVENOUS at 12:12

## 2017-12-12 ENCOUNTER — OFFICE VISIT (OUTPATIENT)
Dept: HEMATOLOGY/ONCOLOGY | Facility: CLINIC | Age: 82
End: 2017-12-12
Payer: MEDICARE

## 2017-12-12 ENCOUNTER — TELEPHONE (OUTPATIENT)
Dept: INFUSION THERAPY | Facility: HOSPITAL | Age: 82
End: 2017-12-12

## 2017-12-12 VITALS
DIASTOLIC BLOOD PRESSURE: 56 MMHG | HEIGHT: 71 IN | HEART RATE: 69 BPM | WEIGHT: 152.75 LBS | SYSTOLIC BLOOD PRESSURE: 112 MMHG | RESPIRATION RATE: 20 BRPM | BODY MASS INDEX: 21.39 KG/M2 | TEMPERATURE: 98 F

## 2017-12-12 DIAGNOSIS — R91.8 PULMONARY NODULES: ICD-10-CM

## 2017-12-12 DIAGNOSIS — T45.1X5A ANEMIA DUE TO ANTINEOPLASTIC CHEMOTHERAPY: ICD-10-CM

## 2017-12-12 DIAGNOSIS — C79.51 BONE METASTASES: ICD-10-CM

## 2017-12-12 DIAGNOSIS — C61 PROSTATE CANCER: Primary | ICD-10-CM

## 2017-12-12 DIAGNOSIS — D64.81 ANEMIA DUE TO ANTINEOPLASTIC CHEMOTHERAPY: ICD-10-CM

## 2017-12-12 PROCEDURE — 99999 PR PBB SHADOW E&M-EST. PATIENT-LVL III: CPT | Mod: PBBFAC,,, | Performed by: INTERNAL MEDICINE

## 2017-12-12 PROCEDURE — 99214 OFFICE O/P EST MOD 30 MIN: CPT | Mod: S$GLB,,, | Performed by: INTERNAL MEDICINE

## 2017-12-12 NOTE — TELEPHONE ENCOUNTER
[12/12/2017 11:39 AM] Kimberly Carreno:   EMILY ALVA [phone]- NO PROCRIT TOMORROW, SEEN TODAY/THANKS  [12/12/2017 2:45 PM] Terri Mosleey:   when am i rescheduling emily alva [phone] thanks  [12/12/2017 2:45 PM] Kimberly Carreno:   4 weeks

## 2017-12-12 NOTE — PROGRESS NOTES
Date of Service: 12/12/2017  An 82-year-old white gentleman well known to me for metastatic prostate   carcinoma involving bone, is currently managed with abiraterone/prednisone.  He   is also followed for stable/indeterminate pulmonary nodules.  He returns to   review interval lab and imaging prior to embarking upon 29th cycle of therapy   for management of his prostate carcinoma.  No new complaints or pertinent   findings on a 14-point review of systems.    PHYSICAL EXAMINATION:  GENERAL:  Well-developed, elderly, thin-appearing, white gentleman in no acute   distress.  VITAL SIGNS:  Weight of 152-1/2 pounds.  HEENT:  Normocephalic, atraumatic.  Oral mucosa pink and moist.  Lips without   lesions.  Tongue midline.  Oropharynx clear.  Nonicteric sclerae.   NECK:  Supple, no adenopathy.  No carotid bruits, thyromegaly or thyroid nodule.                                                                        HEART:  Regular rate and rhythm without murmur, gallop or rub.                LUNGS:  Clear to auscultation bilaterally.  Normal respiratory effort.       ABDOMEN:  Soft, nontender, nondistended with positive normoactive bowel sounds,   no hepatosplenomegaly.    EXTREMITIES:  No cyanosis, clubbing or edema.  Distal pulses are intact.                                              AXILLAE AND GROIN:  No palpable pathologic lymphadenopathy is appreciated.        SKIN:  Intact/turgor normal                                                              NEUROLOGIC:  Cranial nerves II-XII grossly intact.  Motor:  Good muscle bulk and   tone.  Strength/sensory 5/5 throughout.  Ambulates with the assistance of a   cane.    LABORATORY:  White count 7.2, H and H 13.2 and 39.8, platelet count of 120,   sodium 140, potassium 4.4, chloride 103, CO2 27, BUN 34, creatinine 1.2, glucose   102, calcium 10.7, mag 2.0.  Liver function tests are within normal limits.    .  PSA 0.08, down from 0.11.    IMAGING:  CT abdomen and  pelvis is remarkable for healing/sclerotic appearing   bone lesions, but no evidence of disease progression.  Total body bone scan   consistent with healing bony metastatic disease and no evidence of progression.    IMPRESSION:  1.  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  2.  Indeterminate pulmonary nodules - clinically stable.  3.  Treatment-associated anemia - well palliated and not in need of Procrit.  4.  Mild therapy-associated thrombocytopenia, stable/asymptomatic.    PLAN:  1.  Proceed with 29th cycle of therapy to consist of 1000 mg of abiraterone and   10 mg of prednisone daily, days 1 through 28.  2.  Continue to hold Procrit.  3.  Return to clinic in four weeks with interval CBC, CMP, LDH, mag, and PSA   prior to appointment.      FADUMO/HN  dd: 12/12/2017 11:41:33 (CST)  td: 12/13/2017 00:05:47 (CST)  Doc ID   #[id number]  Job ID #[id number]    CC:

## 2017-12-26 ENCOUNTER — PROCEDURE VISIT (OUTPATIENT)
Dept: UROLOGY | Facility: CLINIC | Age: 82
End: 2017-12-26
Payer: MEDICARE

## 2017-12-26 ENCOUNTER — TELEPHONE (OUTPATIENT)
Dept: UROLOGY | Facility: CLINIC | Age: 82
End: 2017-12-26

## 2017-12-26 VITALS
HEIGHT: 71 IN | DIASTOLIC BLOOD PRESSURE: 71 MMHG | BODY MASS INDEX: 21.3 KG/M2 | SYSTOLIC BLOOD PRESSURE: 126 MMHG | HEART RATE: 78 BPM | WEIGHT: 152.13 LBS

## 2017-12-26 DIAGNOSIS — N21.0 BLADDER STONE: Primary | ICD-10-CM

## 2017-12-26 DIAGNOSIS — C67.2 MALIGNANT NEOPLASM OF LATERAL WALL OF URINARY BLADDER: ICD-10-CM

## 2017-12-26 DIAGNOSIS — D49.4 BLADDER TUMOR: ICD-10-CM

## 2017-12-26 DIAGNOSIS — C61 MALIGNANT NEOPLASM OF PROSTATE: ICD-10-CM

## 2017-12-26 DIAGNOSIS — N32.9 LESION OF BLADDER: ICD-10-CM

## 2017-12-26 LAB
BILIRUB SERPL-MCNC: NORMAL MG/DL
BLOOD URINE, POC: NORMAL
COLOR, POC UA: YELLOW
GLUCOSE UR QL STRIP: 100
KETONES UR QL STRIP: NORMAL
LEUKOCYTE ESTERASE URINE, POC: NORMAL
NITRITE, POC UA: NORMAL
PH, POC UA: 5
PROTEIN, POC: NORMAL
SPECIFIC GRAVITY, POC UA: 1.03
UROBILINOGEN, POC UA: NORMAL

## 2017-12-26 PROCEDURE — 81002 URINALYSIS NONAUTO W/O SCOPE: CPT | Mod: S$GLB,,, | Performed by: UROLOGY

## 2017-12-26 PROCEDURE — 52000 CYSTOURETHROSCOPY: CPT | Mod: S$GLB,,, | Performed by: UROLOGY

## 2017-12-26 RX ORDER — GLIMEPIRIDE 2 MG/1
2 TABLET ORAL NIGHTLY
COMMUNITY
Start: 2017-10-09

## 2017-12-26 RX ORDER — OMEPRAZOLE 20 MG/1
20 CAPSULE, DELAYED RELEASE ORAL DAILY PRN
Refills: 3 | COMMUNITY
Start: 2017-10-04

## 2017-12-26 RX ORDER — CLOPIDOGREL BISULFATE 75 MG/1
75 TABLET ORAL DAILY
COMMUNITY
Start: 2017-12-18

## 2017-12-26 RX ORDER — TEMAZEPAM 15 MG/1
15 CAPSULE ORAL NIGHTLY
Refills: 1 | COMMUNITY
Start: 2017-11-27 | End: 2018-06-19

## 2017-12-26 NOTE — TELEPHONE ENCOUNTER
Seen 8/24/17 by Dr. Venegas because Dr. Servin was out of the office.  Patient is scheduled on 1/22/17 @ [facility] for removal of Bladder Tumor and lithotripsy of a Bladder Stone with Dr. Delaney.  Can we hold ASA for 7 days prior to surgery and Plavix for 5 days prior?

## 2017-12-26 NOTE — PROGRESS NOTES
UROLOGY [place]  12 26 17     c-c bladder ca         Age 82, comes in to have his cystoscopy as routine follow up for bladder cancer.  He had a turbt of a bladder tumor in the r lateral wall of the bladder, 4 cm in size, sessile, irregular. There were no other tumors. This was the first bladder tumor pt was found to have and it was first seen in his bladder during a cystoscopy procedure done for hematuria in jul2017. He had the turbt one month later. At the same time he had removal of a bladder stone.     Also being followed for prostate ca, high grade and spread outside of the gland. Pt has done very well with hormonal treatment, which he gets intermittently. He initially received 3 years of LHRH, for a total of 6 injections of trelstar 22.5 mg every 6 months. After that he has been getting the hormonal treatment intermittently.      CYSTOSCOPY [facility]. Anterior urethra normal. Posterior urethra 4 cm, no obstruction seen. Bladder cavity distends equally and symmetrically when filled with water. There is an oval, dark colored, irregular 3cm x 2 cm stone in the bladder. There is an irregularity of the mucosa on the R side, with erythema and some edema, suspicious for tumor recurrence 3 cm x 3 cm. No bleeding is present at this time. There is mild trabeculation and some cellulae formation. No diverticula are present. Pt tolerated the procedure well.       IMP    IMP  Bladder tumor recurrence, 3 cm, r lateral wall, will need turbt.  Bladder stone 3 x 2 cm, will need removal and will use laser to do holmium lithotripsy  Will do retrograde pyelography  Hx of prostate ca, with involvement of all sextants, high grade, jenn 9 (5+4) in all of them, with a participation of between 90 and 100 percent of the tissue camples.   also showed innumerable bony sclerotic lesions on metastatic w/u and lymphadenopathy in the paraaortic nodes T3c N1 M1.

## 2017-12-26 NOTE — TELEPHONE ENCOUNTER
Per Cardiologyist, need to stay on ASA but hold it the morning of the surgery. Stop Plavix for 5 days prior.

## 2017-12-27 DIAGNOSIS — R19.7 DIARRHEA, UNSPECIFIED TYPE: ICD-10-CM

## 2017-12-27 DIAGNOSIS — C79.51 BONE METASTASES: ICD-10-CM

## 2017-12-27 DIAGNOSIS — E86.0 DEHYDRATION: ICD-10-CM

## 2017-12-27 DIAGNOSIS — C61 PROSTATE CANCER: ICD-10-CM

## 2017-12-27 RX ORDER — ABIRATERONE ACETATE 250 MG/1
1000 TABLET ORAL DAILY
Qty: 112 TABLET | Refills: 2 | Status: SHIPPED | OUTPATIENT
Start: 2017-12-27 | End: 2018-01-02 | Stop reason: SDUPTHER

## 2018-01-02 DIAGNOSIS — C79.51 BONE METASTASES: ICD-10-CM

## 2018-01-02 DIAGNOSIS — R19.7 DIARRHEA, UNSPECIFIED TYPE: ICD-10-CM

## 2018-01-02 DIAGNOSIS — E86.0 DEHYDRATION: ICD-10-CM

## 2018-01-02 DIAGNOSIS — C61 PROSTATE CANCER: Primary | ICD-10-CM

## 2018-01-02 RX ORDER — PREDNISONE 10 MG/1
10 TABLET ORAL DAILY
Qty: 30 TABLET | Refills: 2 | Status: SHIPPED | OUTPATIENT
Start: 2018-01-02 | End: 2018-03-19 | Stop reason: SDUPTHER

## 2018-01-02 NOTE — TELEPHONE ENCOUNTER
[facility]    ----- Message from Rachelle Rodney RN sent at 1/2/2018 12:49 PM CST -----      ----- Message -----  From: Randee Lamas  Sent: 1/2/2018  10:14 AM  To: Jim CASTAÑEDA Staff    [facility].  [phone] ext [phone].

## 2018-01-03 RX ORDER — ABIRATERONE ACETATE 250 MG/1
1000 TABLET ORAL DAILY
Qty: 112 TABLET | Refills: 2 | Status: SHIPPED | OUTPATIENT
Start: 2018-01-03 | End: 2018-01-17 | Stop reason: SDUPTHER

## 2018-01-09 ENCOUNTER — OFFICE VISIT (OUTPATIENT)
Dept: HEMATOLOGY/ONCOLOGY | Facility: CLINIC | Age: 83
End: 2018-01-09
Payer: MEDICARE

## 2018-01-09 ENCOUNTER — INFUSION (OUTPATIENT)
Dept: INFUSION THERAPY | Facility: HOSPITAL | Age: 83
End: 2018-01-09
Attending: INTERNAL MEDICINE
Payer: MEDICARE

## 2018-01-09 VITALS
BODY MASS INDEX: 21.74 KG/M2 | HEART RATE: 69 BPM | HEIGHT: 71 IN | TEMPERATURE: 98 F | SYSTOLIC BLOOD PRESSURE: 111 MMHG | DIASTOLIC BLOOD PRESSURE: 55 MMHG | RESPIRATION RATE: 18 BRPM | WEIGHT: 155.31 LBS

## 2018-01-09 VITALS
BODY MASS INDEX: 21.74 KG/M2 | WEIGHT: 155.31 LBS | SYSTOLIC BLOOD PRESSURE: 111 MMHG | HEART RATE: 69 BPM | TEMPERATURE: 98 F | RESPIRATION RATE: 18 BRPM | HEIGHT: 71 IN | DIASTOLIC BLOOD PRESSURE: 55 MMHG

## 2018-01-09 DIAGNOSIS — C79.51 BONE METASTASES: ICD-10-CM

## 2018-01-09 DIAGNOSIS — E86.0 DEHYDRATION: Primary | ICD-10-CM

## 2018-01-09 DIAGNOSIS — T45.1X5A CHEMOTHERAPY-INDUCED THROMBOCYTOPENIA: ICD-10-CM

## 2018-01-09 DIAGNOSIS — D64.81 ANEMIA DUE TO ANTINEOPLASTIC CHEMOTHERAPY: ICD-10-CM

## 2018-01-09 DIAGNOSIS — T45.1X5A ANEMIA DUE TO ANTINEOPLASTIC CHEMOTHERAPY: ICD-10-CM

## 2018-01-09 DIAGNOSIS — R91.8 PULMONARY NODULES: ICD-10-CM

## 2018-01-09 DIAGNOSIS — D69.59 CHEMOTHERAPY-INDUCED THROMBOCYTOPENIA: ICD-10-CM

## 2018-01-09 DIAGNOSIS — C61 PROSTATE CANCER: ICD-10-CM

## 2018-01-09 DIAGNOSIS — R19.7 DIARRHEA, UNSPECIFIED TYPE: ICD-10-CM

## 2018-01-09 DIAGNOSIS — C61 PROSTATE CANCER: Primary | ICD-10-CM

## 2018-01-09 PROCEDURE — 63600175 PHARM REV CODE 636 W HCPCS: Mod: TB,PN | Performed by: INTERNAL MEDICINE

## 2018-01-09 PROCEDURE — 96372 THER/PROPH/DIAG INJ SC/IM: CPT | Mod: PN

## 2018-01-09 PROCEDURE — 99999 PR PBB SHADOW E&M-EST. PATIENT-LVL III: CPT | Mod: PBBFAC,,, | Performed by: INTERNAL MEDICINE

## 2018-01-09 PROCEDURE — 99214 OFFICE O/P EST MOD 30 MIN: CPT | Mod: S$GLB,,, | Performed by: INTERNAL MEDICINE

## 2018-01-09 RX ADMIN — DENOSUMAB 120 MG: 120 INJECTION SUBCUTANEOUS at 10:01

## 2018-01-09 NOTE — PROGRESS NOTES
Patient, Cameron Bridges (MRN #[id number]), presented with a recent Platelet count less than 150 K/uL consistent with the definition of thrombocytopenia (ICD10 - D69.6).    Platelets   Date Value Ref Range Status   01/09/2018 113 (L) 150 - 350 K/uL Final     The patient's thrombocytopenia was monitored, evaluated, addressed and/or treated. This addendum to the medical record is made on 01/09/2018.

## 2018-01-10 NOTE — PROGRESS NOTES
Date of Service: 01/09/2018  HISTORY OF PRESENT ILLNESS:  This is an 82-year-old gentleman well known to me   for metastatic prostate carcinoma involving bone.  The patient also has   indeterminate pulmonary nodules.  He is managed with   abiraterone/prednisone/quarterly Xgeva.  He returns to clinic for evaluation   prior to same.  Xgeva is due with today's treatment.  No other complaints or   pertinent findings on a 14-point review of systems.    PHYSICAL EXAMINATION:  GENERAL:  Well-developed, elderly, frail-appearing white gentleman in no acute   distress, with a weight of 155-1/2 pounds (increased by 3 pounds).  VITAL SIGNS:  Documented in EMR and reviewed.  HEENT:  Normocephalic, atraumatic.  Oral mucosa pink and moist.  Lips without   lesions.  Tongue midline.  Oropharynx clear.  Nonicteric sclerae.   NECK:  Supple, no adenopathy.  No carotid bruits, thyromegaly or thyroid nodule  HEART:  Regular rate and rhythm without murmur, gallop or rub.   LUNGS:  Clear to auscultation bilaterally.  Normal respiratory effort.       ABDOMEN:  Soft, nontender, nondistended with positive normoactive bowel sounds,   no hepatosplenomegaly.  EXTREMITIES:  No cyanosis, clubbing or edema.  Distal pulses are intact.  AXILLAE AND GROIN:  No palpable pathologic lymphadenopathy is appreciated.  SKIN:  Intact/turgor normal.  NEUROLOGIC:  Cranial nerves II-XII grossly intact.  Motor:  Good muscle bulk and   tone.  Strength/sensory 5/5 throughout.  Ambulates with the assistance of a   cane.    LABORATORY DATA:  White count 4.6, H and H 10.2 and 31.6 and platelet count of   113.  Sodium 140, potassium 4.5, chloride 105, CO2 27, BUN 37, creatinine 1.1,   glucose 68, calcium 9.5, mag 1.8.  Liver function tests are within normal   limits.  LDH is 438.  GFR is greater than 60.  PSA remains suppressed at 0.09.    IMPRESSION:  1.  Metastatic prostate carcinoma involving bone -- clinically stable.  2.  Indeterminate pulmonary nodules --  stable.  3.  Treatment-associated anemia, not in need of Procrit.  4.  Mild treatment associated thrombocytopenia, not in need of intervention.    PLAN:  1.  Proceed with 30th cycle of therapy to consist of 1000 mg of abiraterone and   10 mg of prednisone daily, days 1 through 28.  2.  Hold Procrit.  3.  Continue calcium supplementation with vitamin D and repeat Xgeva 120 mg   subQ.  4.  Return to clinic in 4 weeks from now with interval CBC, CMP, LDH, magnesium   and PSA prior to appointment.      FADUMO/HN  dd: 01/09/2018 10:05:13 (CST)  td: 01/09/2018 22:35:54 (CST)  Doc ID   #[id number]  Job ID #[id number]    CC:

## 2018-01-15 NOTE — TELEPHONE ENCOUNTER
FOR DOCUMENTATION ONLY:  Financial Assistance for Zytiga is approved 1-14-18 temporarily pending decision from [employer] Extra Help program.

## 2018-01-16 DIAGNOSIS — I73.9 PERIPHERAL VASCULAR DISEASE, UNSPECIFIED: Primary | ICD-10-CM

## 2018-01-17 DIAGNOSIS — E86.0 DEHYDRATION: ICD-10-CM

## 2018-01-17 DIAGNOSIS — R19.7 DIARRHEA, UNSPECIFIED TYPE: ICD-10-CM

## 2018-01-17 DIAGNOSIS — C61 PROSTATE CANCER: ICD-10-CM

## 2018-01-17 DIAGNOSIS — C79.51 BONE METASTASES: ICD-10-CM

## 2018-01-17 RX ORDER — ABIRATERONE ACETATE 250 MG/1
1000 TABLET ORAL DAILY
Qty: 112 TABLET | Refills: 2 | Status: SHIPPED | OUTPATIENT
Start: 2018-01-17 | End: 2018-03-19 | Stop reason: SDUPTHER

## 2018-01-22 PROBLEM — N21.0 BLADDER STONE: Status: ACTIVE | Noted: 2018-01-22

## 2018-01-23 ENCOUNTER — NURSE TRIAGE (OUTPATIENT)
Dept: ADMINISTRATIVE | Facility: CLINIC | Age: 83
End: 2018-01-23

## 2018-01-24 ENCOUNTER — TELEPHONE (OUTPATIENT)
Dept: HEMATOLOGY/ONCOLOGY | Facility: CLINIC | Age: 83
End: 2018-01-24

## 2018-01-24 NOTE — TELEPHONE ENCOUNTER
"  Reason for Disposition   Sounds like a serious complication to the triager    Answer Assessment - Initial Assessment Questions  1. SYMPTOM: "What's the main symptom you're concerned about?" (e.g., pain, fever, vomiting)      Unable to void  2. ONSET: "When did ________  start?"      [facility] -he did void a couple of times before leaving hospital about 1330- has been drinking since then, feels the need to go but unable to void  3. SURGERY: "What surgery was performed?"      Stone removed from bladder  4. DATE of SURGERY: "When was surgery performed?"       1/22/18  5. ANESTHESIA: " What type of anesthesia did you have?" (e.g., general, spinal, epidural, local)      n/a  6. PAIN: "Is there any pain?" If so, ask: "How bad is it?"  (Scale 1-10; or mild, moderate, severe)      n/a  7. FEVER: "Do you have a fever?" If so, ask: "What is your temperature, how was it measured, and when did it start?"      n/a  8. VOMITING: "Is there any vomiting?" If yes, ask: "How many times?"      N/a      9. BLEEDING: "Is there any bleeding?" If so, ask: "How much?" and "Where?"      Not reported  10. OTHER SYMPTOMS: "Do you have any other symptoms?" (e.g., drainage from wound, painful urination, constipation)        None reported    Protocols used: ST POST-OP SYMPTOMS AND [id number]    "

## 2018-01-24 NOTE — TELEPHONE ENCOUNTER
Wife ([place]ia) states that the medication for her  has not arrived and [employer] states they have sent back to doctor.  Informed her I would follow up and call her back with info.

## 2018-01-24 NOTE — TELEPHONE ENCOUNTER
Contacted [employer] who states they have not shipped to the patient since 12/2017. Provided verbal rx as they stated they did not have one on file. [facility] stated he would process rx to have it shipped to patient.    Contacted Mr. Bridges and updated him on status. He will call [facility] if he has not received medication by 1/25/2018.

## 2018-01-24 NOTE — TELEPHONE ENCOUNTER
Notified office that his zytiga was returned due to nobody to accept package at home according to [employer].  Will follow up with mfg

## 2018-01-24 NOTE — TELEPHONE ENCOUNTER
----- Message from Mone Mckay sent at 1/24/2018 10:37 AM CST -----  Contact: Wife  [place], wife [phone] calling to speak with office regarding cancer medication that was sent through [facility] [employer], patient states that medication was never delivered and [employer] told her it was sent back to the doctors office. Please advise. Thanks.

## 2018-01-29 ENCOUNTER — TELEPHONE (OUTPATIENT)
Dept: HEMATOLOGY/ONCOLOGY | Facility: CLINIC | Age: 83
End: 2018-01-29

## 2018-01-29 ENCOUNTER — TELEPHONE (OUTPATIENT)
Dept: UROLOGY | Facility: CLINIC | Age: 83
End: 2018-01-29

## 2018-01-29 DIAGNOSIS — C67.9 MALIGNANT NEOPLASM OF URINARY BLADDER, UNSPECIFIED SITE: Primary | ICD-10-CM

## 2018-01-29 NOTE — TELEPHONE ENCOUNTER
----- Message from Darrin Whelan sent at 1/29/2018  1:55 PM CST -----  Contact: patient  Patient called requesting refill on zytiga. Patient haven't gotten medication yet.been off medication ten days. Please call back at [phone]

## 2018-01-29 NOTE — TELEPHONE ENCOUNTER
I called pt and let him know the results of the path report. Pt needs to be back here in 3 months for his next cysto. He can also come in next week just to see how he is doing

## 2018-02-06 ENCOUNTER — OFFICE VISIT (OUTPATIENT)
Dept: HEMATOLOGY/ONCOLOGY | Facility: CLINIC | Age: 83
End: 2018-02-06
Payer: MEDICARE

## 2018-02-06 ENCOUNTER — TELEPHONE (OUTPATIENT)
Dept: VASCULAR SURGERY | Facility: CLINIC | Age: 83
End: 2018-02-06

## 2018-02-06 ENCOUNTER — INFUSION (OUTPATIENT)
Dept: INFUSION THERAPY | Facility: HOSPITAL | Age: 83
End: 2018-02-06
Attending: INTERNAL MEDICINE
Payer: MEDICARE

## 2018-02-06 VITALS
BODY MASS INDEX: 21.45 KG/M2 | DIASTOLIC BLOOD PRESSURE: 57 MMHG | TEMPERATURE: 99 F | SYSTOLIC BLOOD PRESSURE: 120 MMHG | HEART RATE: 74 BPM | WEIGHT: 153.25 LBS | HEIGHT: 71 IN | RESPIRATION RATE: 17 BRPM

## 2018-02-06 VITALS
TEMPERATURE: 99 F | HEIGHT: 71 IN | DIASTOLIC BLOOD PRESSURE: 57 MMHG | WEIGHT: 153.25 LBS | RESPIRATION RATE: 17 BRPM | SYSTOLIC BLOOD PRESSURE: 120 MMHG | BODY MASS INDEX: 21.45 KG/M2 | HEART RATE: 74 BPM

## 2018-02-06 DIAGNOSIS — C79.51 BONE METASTASES: Primary | ICD-10-CM

## 2018-02-06 DIAGNOSIS — C79.51 BONE METASTASES: ICD-10-CM

## 2018-02-06 DIAGNOSIS — D63.0 ANEMIA IN NEOPLASTIC DISEASE: ICD-10-CM

## 2018-02-06 DIAGNOSIS — C61 PROSTATE CANCER: Primary | ICD-10-CM

## 2018-02-06 DIAGNOSIS — C61 PROSTATE CANCER: ICD-10-CM

## 2018-02-06 PROCEDURE — 63600175 PHARM REV CODE 636 W HCPCS: Mod: TB,PN,EA | Performed by: NURSE PRACTITIONER

## 2018-02-06 PROCEDURE — 99214 OFFICE O/P EST MOD 30 MIN: CPT | Mod: S$GLB,,, | Performed by: NURSE PRACTITIONER

## 2018-02-06 PROCEDURE — 1125F AMNT PAIN NOTED PAIN PRSNT: CPT | Mod: S$GLB,,, | Performed by: NURSE PRACTITIONER

## 2018-02-06 PROCEDURE — 96372 THER/PROPH/DIAG INJ SC/IM: CPT | Mod: PN

## 2018-02-06 PROCEDURE — 1159F MED LIST DOCD IN RCRD: CPT | Mod: S$GLB,,, | Performed by: NURSE PRACTITIONER

## 2018-02-06 PROCEDURE — 99999 PR PBB SHADOW E&M-EST. PATIENT-LVL III: CPT | Mod: PBBFAC,,, | Performed by: NURSE PRACTITIONER

## 2018-02-06 PROCEDURE — 3008F BODY MASS INDEX DOCD: CPT | Mod: S$GLB,,, | Performed by: NURSE PRACTITIONER

## 2018-02-06 RX ADMIN — ERYTHROPOIETIN 40000 UNITS: 40000 INJECTION, SOLUTION INTRAVENOUS; SUBCUTANEOUS at 11:02

## 2018-02-06 NOTE — TELEPHONE ENCOUNTER
Pt to call to schedule lower extremity u/s at later date. Reports no claudication at present time.

## 2018-02-06 NOTE — TELEPHONE ENCOUNTER
----- Message from Adamaris Patel sent at 2/6/2018 11:01 AM CST -----    Calling to  See if  patient  Can  Be fitted in tomorrow  For  Follow up,  Pt is  Coming  In   For another  Doctor kallie // please call [phone]

## 2018-02-06 NOTE — PLAN OF CARE
Problem: Patient Care Overview  Goal: Plan of Care Review  Outcome: Ongoing (interventions implemented as appropriate)  Pt tolerated injection well without complaints. AVS printed and reviewed. Pt verbalized understanding. D/C to home with steady gait.

## 2018-02-06 NOTE — PATIENT INSTRUCTIONS
Epoetin Nico injection  What is this medicine?  EPOETIN NICO (e CHYNA e tin AL fa) helps your body make more red blood cells. This medicine is used to treat anemia caused by chronic kidney failure, cancer chemotherapy, or HIV-therapy. It may also be used before surgery if you have anemia.  How should I use this medicine?  This medicine is for injection into a vein or under the skin. It is usually given by a health care professional in a hospital or clinic setting.  If you get this medicine at home, you will be taught how to prepare and give this medicine. Use exactly as directed. Take your medicine at regular intervals. Do not take your medicine more often than directed.  It is important that you put your used needles and syringes in a special sharps container. Do not put them in a trash can. If you do not have a sharps container, call your pharmacist or healthcare provider to get one.  Talk to your pediatrician regarding the use of this medicine in children. While this drug may be prescribed for selected conditions, precautions do apply.  What side effects may I notice from receiving this medicine?  Side effects that you should report to your doctor or health care professional as soon as possible:  · allergic reactions like skin rash, itching or hives, swelling of the face, lips, or tongue  · breathing problems  · changes in vision  · chest pain  · confusion, trouble speaking or understanding  · feeling faint or lightheaded, falls  · high blood pressure  · muscle aches or pains  · pain, swelling, warmth in the leg  · rapid weight gain  · severe headaches  · sudden numbness or weakness of the face, arm or leg  · trouble walking, dizziness, loss of balance or coordination  · seizures (convulsions)  · swelling of the ankles, feet, hands  · unusually weak or tired  Side effects that usually do not require medical attention (report to your doctor or health care professional if they continue or are  bothersome):  · diarrhea  · fever, chills (flu-like symptoms)  · headaches  · nausea, vomiting  · redness, stinging, or swelling at site where injected  What may interact with this medicine?  Do not take this medicine with any of the following medications:  · darbepoetin andrew  What if I miss a dose?  If you miss a dose, take it as soon as you can. If it is almost time for your next dose, take only that dose. Do not take double or extra doses.  Where should I keep my medicine?  Keep out of the reach of children.  Store in a refrigerator between 2 and 8 degrees C (36 and 46 degrees F). Do not freeze or shake. Throw away any unused portion if using a single-dose vial. Multi-dose vials can be kept in the refrigerator for up to 21 days after the initial dose. Throw away unused medicine.  What should I tell my health care provider before I take this medicine?  They need to know if you have any of these conditions:  · blood clotting disorders  · cancer patient not on chemotherapy  · cystic fibrosis  · heart disease, such as angina or heart failure  · hemoglobin level of 12 g/dL or greater  · high blood pressure  · low levels of folate, iron, or vitamin B12  · seizures  · an unusual or allergic reaction to erythropoietin, albumin, benzyl alcohol, hamster proteins, other medicines, foods, dyes, or preservatives  · pregnant or trying to get pregnant  · breast-feeding  What should I watch for while using this medicine?  Visit your prescriber or health care professional for regular checks on your progress and for the needed blood tests and blood pressure measurements. It is especially important for the doctor to make sure your hemoglobin level is in the desired range, to limit the risk of potential side effects and to give you the best benefit. Keep all appointments for any recommended tests. Check your blood pressure as directed. Ask your doctor what your blood pressure should be and when you should contact him or her.  As  your body makes more red blood cells, you may need to take iron, folic acid, or vitamin B supplements. Ask your doctor or health care provider which products are right for you. If you have kidney disease continue dietary restrictions, even though this medication can make you feel better. Talk with your doctor or health care professional about the foods you eat and the vitamins that you take.  NOTE:This sheet is a summary. It may not cover all possible information. If you have questions about this medicine, talk to your doctor, pharmacist, or health care provider. Copyright© 2017 [employer]        Preventing Falls: Are You At Risk of Falling?     Ask for help to reduce risk of falling in your home.     As you get older, you're not as steady on your feet as you once were. And you may have health problems you didn't have when you were younger. So, it's not surprising that older people are more likely to trip and fall. Falling can be very serious. It can change your overall health and quality of life. That's why it's important to be aware of your own risk of falling.  The dangers of falling  Falls are one of the main causes of injury in people over age 65. An older person who falls may take longer to get better than a younger person. And, after a fall, an older person is more likely to have problems that don't go away. So, preventing falls can help you avoid serious health problems.  Are you at risk of falling?  Answer these questions to rate your level of risk.  · Are you a woman?  · Have you fallen or stumbled in the last year?  · Are you over age 65?  · Are you ever dizzy or lightheaded with standing?  · Do you have a hard time getting in and out of the bathtub or on and off the toilet?  · Do you lean on objects to help you get around? Or do you use a cane or walker?  · Do you have vision or hearing problems? For example, do you need new glasses or hearing aids?  · Do you have 2 or more long-lasting (chronic)  "medical conditions?  · Do you take 3 or more medicines?  · Have you felt depressed recently?  · Have you had more trouble with your memory in recent months?  · Are there hazards in your home that might cause you to fall, such as loose rugs or poor lighting?  · Do you have a pet that jumps on you or might trip you?  · Have you stopped getting regular exercise?  · Do you have diabetes?   · Do you have a neurologic disease, such as Parkinson or Alzheimer disease?   · Do you drink alcohol?  · Do you wear athletic shoes or slippers, or go barefoot at home?  You can help prevent falls  If you answered "yes" to any of the above questions, you should take steps to reduce your risk of a fall. Monitoring health conditions and keeping walkways in your home free of clutter are just 2 ways. Changing is sometimes easier said than done. But keep in mind that even small changes can make you less likely to fall.  The fear of falling  It's normal to be scared of falling, especially if you've fallen before. But being afraid can actually make you more likely to fall. This is because:  · Fear might cause you to become less active. Being less active can lead to a loss of strength and balance.  · Fear can lead to isolation from others, depression, or the use of more medicines or alcohol. And all these things make falling even more likely.  To break the cycle, learn more about ways to avoid falling. As you take control, you may find yourself feeling less afraid.   Date Last Reviewed: 6/12/2015  © 1609-8394 [employer]. [address] [place] [address], [place] [address]. All rights reserved. This information is not intended as a substitute for professional medical care. Always follow your healthcare professional's instructions.        Oncology: Preventing Infections  Chemotherapy can make your body less able to fight off infection. This happens because treatment reduces the number of white blood cells. White blood cells fight infection " in your body. To help prevent infections, follow the tips below.     Scrub your hands with soap and warm water for at least 15 seconds.   Know your thony  The thony is the time during your chemotherapy cycle when you have the fewest white blood cells. The length of your thony and when it occurs depend on the medicines you are taking. Each medicine has its own thony. Talk with your doctor or nurse about your thony period. Then take extra precautions to prevent infection at that time.  Protect yourself  · Keep your hands clean. To reduce your risk of infection, bathe every day and wash your hands often throughout the day. For best results, lather them with soap for at least 15 seconds. Wash your hands before eating, after spending time in public places, and after using the bathroom.  · Stay away from some foods. Limit your risk. Dont eat uncooked or undercooked meat or fish. You may also be told not to eat raw vegetables or thin-skinned fruits during your thony.  · Reduce your risk for illness. During this time your body is less able to fight off colds, measles, and other illnesses. Stay away from anyone who has a fever or an infection. Also stay away from large crowds during your thony.  · Wear gloves. Make it harder for infection to enter your body. Wear gloves when you work around germs and dirt. Have someone else clean a pets tank, cage, or litter box.  · Try not to accidentally cut yourself. Protect your feet from injury and germs by not walking barefoot.  How medicines can help  · Prevent and treat infection. Antibiotics work in this way by attacking and killing the germs that cause infection.  · Trigger new cell growth. These medicines cause your body to make new white blood cells. Neupogen is an example of such a medicine.  Talk with your doctor about the best medicines for you. In some cases, your doctor may tell you to not take acetaminophen or NSAIDs for pain relief because these medicines can mask a  fever if you have neutropenia. Talk with your doctor about medicines for pain control if you need it during your thony period.  When to seek medical advice  Contact your doctor right away if you have any of the following:  · Fever of 100.4ºF (38ºC) or higher, or as directed by your healthcare provider  · Burning when you urinate  · Severe coughing or sore throat  · Shortness of breath, sweating, or chills  · Pain, especially near an open wound or catheter site   Date Last Reviewed: 1/3/2016  © 4135-8012 [employer]. [address] [place] [address]. All rights reserved. This information is not intended as a substitute for professional medical care. Always follow your healthcare professional's instructions.

## 2018-02-07 ENCOUNTER — TELEPHONE (OUTPATIENT)
Dept: HEMATOLOGY/ONCOLOGY | Facility: CLINIC | Age: 83
End: 2018-02-07

## 2018-02-07 ENCOUNTER — OFFICE VISIT (OUTPATIENT)
Dept: UROLOGY | Facility: CLINIC | Age: 83
End: 2018-02-07
Payer: MEDICARE

## 2018-02-07 VITALS — BODY MASS INDEX: 21.42 KG/M2 | HEIGHT: 71 IN | WEIGHT: 153 LBS

## 2018-02-07 DIAGNOSIS — Z85.51 HX OF BLADDER CANCER: Primary | ICD-10-CM

## 2018-02-07 DIAGNOSIS — Z09 POSTOP CHECK: ICD-10-CM

## 2018-02-07 LAB
BILIRUB SERPL-MCNC: ABNORMAL MG/DL
BLOOD URINE, POC: ABNORMAL
COLOR, POC UA: YELLOW
GLUCOSE UR QL STRIP: 1000
KETONES UR QL STRIP: ABNORMAL
LEUKOCYTE ESTERASE URINE, POC: ABNORMAL
NITRITE, POC UA: ABNORMAL
PH, POC UA: 5.5
PROTEIN, POC: ABNORMAL
SPECIFIC GRAVITY, POC UA: 1.01
UROBILINOGEN, POC UA: ABNORMAL

## 2018-02-07 PROCEDURE — 99024 POSTOP FOLLOW-UP VISIT: CPT | Mod: S$GLB,,, | Performed by: UROLOGY

## 2018-02-07 PROCEDURE — 99999 PR PBB SHADOW E&M-EST. PATIENT-LVL II: CPT | Mod: PBBFAC,,, | Performed by: UROLOGY

## 2018-02-07 PROCEDURE — 81002 URINALYSIS NONAUTO W/O SCOPE: CPT | Mod: S$GLB,,, | Performed by: UROLOGY

## 2018-02-07 NOTE — PROGRESS NOTES
HISTORY OF PRESENT ILLNESS:  This is an 82-year-old gentleman well known to Dr. Fernandez   for metastatic prostate carcinoma involving bone.  The patient also has indeterminate pulmonary nodules.    He is managed with Abiraterone/prednisone/quarterly Xgeva.  He returns to clinic for evaluation   prior to cycle 31 of treatment.  He complains of fatigue and diarrhea that is off and on.  He denies any fevers,   chills, drenching night sweats, unexplained weight loss, abdominal discomfort, nausea, vomiting, mouth sores, etc.     No other complaints or pertinent findings on a 14-point review of systems.    PHYSICAL EXAMINATION:  GENERAL:  Well-developed, elderly, frail-appearing white gentleman in no acute distress.  Alert & oriented x 3.  VITAL SIGNS:  Weight:  Decrease of 2 pounds/4 weeks  Wt Readings from Last 3 Encounters:   02/06/18 69.5 kg (153 lb 3.5 oz)   02/06/18 69.5 kg (153 lb 3.5 oz)   01/22/18 66.3 kg (146 lb 2.6 oz)     Temp Readings from Last 3 Encounters:   02/06/18 98.8 °F (37.1 °C)   02/06/18 98.8 °F (37.1 °C) (Oral)   01/23/18 98.1 °F (36.7 °C)     BP Readings from Last 3 Encounters:   02/06/18 (!) 120/57   02/06/18 (!) 120/57   01/23/18 121/61     Pulse Readings from Last 3 Encounters:   02/06/18 74   02/06/18 74   01/23/18 69     HEENT:  Normocephalic, atraumatic.  Oral mucosa pink and moist.  Lips without   lesions.  Tongue midline.  Oropharynx clear.  Nonicteric sclerae.   NECK:  Supple, no adenopathy.  No carotid bruits, thyromegaly or thyroid nodule  HEART:  Regular rate and rhythm without murmur, gallop or rub.   LUNGS:  Clear to auscultation bilaterally.  Normal respiratory effort.       ABDOMEN:  Soft, nontender, nondistended with positive normoactive bowel sounds,   no hepatosplenomegaly.  EXTREMITIES:  Mild edema to RLE, no cyanosis or clubbing.  Distal pulses are intact.  AXILLAE AND GROIN:  No palpable pathologic lymphadenopathy is appreciated.  SKIN:  Intact/turgor normal.  NEUROLOGIC:   Cranial nerves II-XII grossly intact.  Motor:  Good muscle bulk and   tone.  Strength/sensory 5/5 throughout.     LABORATORY DATA:    Lab Results   Component Value Date    WBC 5.25 02/06/2018    HGB 9.7 (L) 02/06/2018    HCT 30.1 (L) 02/06/2018    MCV 96 02/06/2018     02/06/2018     Differential remarkable for 73.1% grans, 17.5% lymph    CMP  Sodium   Date Value Ref Range Status   02/06/2018 140 136 - 145 mmol/L Final     Potassium   Date Value Ref Range Status   02/06/2018 4.4 3.5 - 5.1 mmol/L Final     Chloride   Date Value Ref Range Status   02/06/2018 106 95 - 110 mmol/L Final     CO2   Date Value Ref Range Status   02/06/2018 26 22 - 31 mmol/L Final     Glucose   Date Value Ref Range Status   02/06/2018 132 (H) 70 - 110 mg/dL Final     Comment:     The ADA recommends the following guidelines for fasting glucose:  Normal:       less than 100 mg/dL  Prediabetes:  100 mg/dL to 125 mg/dL  Diabetes:     126 mg/dL or higher       BUN, Bld   Date Value Ref Range Status   02/06/2018 31 (H) 9 - 21 mg/dL Final     Creatinine   Date Value Ref Range Status   02/06/2018 1.20 0.50 - 1.40 mg/dL Final     Calcium   Date Value Ref Range Status   02/06/2018 9.3 8.4 - 10.2 mg/dL Final     Total Protein   Date Value Ref Range Status   02/06/2018 6.0 6.0 - 8.4 g/dL Final     Albumin   Date Value Ref Range Status   02/06/2018 3.7 3.5 - 5.2 g/dL Final     Total Bilirubin   Date Value Ref Range Status   02/06/2018 0.4 0.2 - 1.3 mg/dL Final     Alkaline Phosphatase   Date Value Ref Range Status   02/06/2018 61 38 - 145 U/L Final     AST   Date Value Ref Range Status   02/06/2018 19 17 - 59 U/L Final     ALT   Date Value Ref Range Status   02/06/2018 23 10 - 44 U/L Final     Anion Gap   Date Value Ref Range Status   02/06/2018 8 8 - 16 mmol/L Final     eGFR if    Date Value Ref Range Status   02/06/2018 >60 >60 mL/min/1.73 m^2 Final     eGFR if non    Date Value Ref Range Status   02/06/2018 56  (A) >60 mL/min/1.73 m^2 Final     Comment:     Calculation used to obtain the estimated glomerular filtration  rate (eGFR) is the CKD-EPI equation.        , Magnesium 1.8  PSA 0.13    IMPRESSION:  1.  Metastatic prostate carcinoma involving bone -- clinically stable.  2.  Indeterminate pulmonary nodules -- stable.  3.  Treatment-associated anemia, not in need of Procrit.  4.  Mild treatment associated thrombocytopenia, not in need of intervention.    PLAN:  1.  Proceed with 31st cycle of therapy to consist of 1000 mg of Abiraterone and 10 mg of prednisone daily, days 1 through 28.  2.  Procrit 40,000 units SQ today (Day 1) & Day 8 for palliation of anemia.  CBC in 2 weeks and evaluate for possible Procrit on Day 15 & 22.  3.  Continue calcium supplementation with vitamin D.  4.  Return to clinic in 4 weeks with interval CBC, CMP, LDH, magnesium and PSA prior to appointment for evaluation prior to Cycle 32.    To note:  Last XGEVA given 01/09/18; due April with Cycle 33    Assessment/plan reviewed and approved by Dr. Poe

## 2018-02-07 NOTE — PROGRESS NOTES
UROLOGY [facility]  2 7 18    c-c postop    Age 82, comes in to follow up on his recent bladder cancer surgery.     The path report shows as follows:  01/24/18 PMB/kl  BLADDER TUMOR:  --FOCAL TRANSITIONAL CELL CARCINOMA IN SITU.  --FIBROMUSCULAR TISSUE (MUSCULARIS PROPRIA IS WELL REPRESENTED) WITH   PREVIOUS BIOPSY SITE.   --EXTENSIVE DYSTROPHIC CALCIFICATION WITHIN MUSCULAR BLADDER WALL.  --NEGATIVE FOR INVASIVE MALIGNANCY.     Pt says he did very well after surgery, with some blood for 2-3 days, and a few clots in the beginning and then no more since then.     He has been bothered with constipation and fecal impaction, and has needed to take a number of medication for it, including magnesium citrate and miralax. He is well now from that.     IMP  Bladder ca, s/p recent resection  Bladder stone, s/p recent removal  Chronic constipation, on proper treatment at this time    RTC 6 mo for cysto

## 2018-02-12 ENCOUNTER — INFUSION (OUTPATIENT)
Dept: INFUSION THERAPY | Facility: HOSPITAL | Age: 83
End: 2018-02-12
Attending: INTERNAL MEDICINE
Payer: MEDICARE

## 2018-02-12 VITALS
HEART RATE: 70 BPM | DIASTOLIC BLOOD PRESSURE: 56 MMHG | RESPIRATION RATE: 18 BRPM | HEIGHT: 71 IN | TEMPERATURE: 98 F | BODY MASS INDEX: 21.42 KG/M2 | WEIGHT: 153 LBS | SYSTOLIC BLOOD PRESSURE: 119 MMHG

## 2018-02-12 DIAGNOSIS — C61 PROSTATE CANCER: ICD-10-CM

## 2018-02-12 DIAGNOSIS — C79.51 BONE METASTASES: Primary | ICD-10-CM

## 2018-02-12 DIAGNOSIS — D64.9 ANEMIA, UNSPECIFIED TYPE: ICD-10-CM

## 2018-02-12 PROCEDURE — 96372 THER/PROPH/DIAG INJ SC/IM: CPT | Mod: PN

## 2018-02-12 PROCEDURE — 63600175 PHARM REV CODE 636 W HCPCS: Mod: TB,PN,EA | Performed by: NURSE PRACTITIONER

## 2018-02-12 RX ADMIN — ERYTHROPOIETIN 40000 UNITS: 40000 INJECTION, SOLUTION INTRAVENOUS; SUBCUTANEOUS at 12:02

## 2018-02-13 RX ORDER — ISOSORBIDE MONONITRATE 60 MG/1
TABLET, EXTENDED RELEASE ORAL
Qty: 90 TABLET | Refills: 4 | Status: SHIPPED | OUTPATIENT
Start: 2018-02-13 | End: 2018-03-06

## 2018-02-20 ENCOUNTER — LAB VISIT (OUTPATIENT)
Dept: LAB | Facility: HOSPITAL | Age: 83
End: 2018-02-20
Attending: INTERNAL MEDICINE
Payer: MEDICARE

## 2018-02-20 ENCOUNTER — OFFICE VISIT (OUTPATIENT)
Dept: HEMATOLOGY/ONCOLOGY | Facility: CLINIC | Age: 83
End: 2018-02-20
Payer: MEDICARE

## 2018-02-20 VITALS
WEIGHT: 154.31 LBS | RESPIRATION RATE: 18 BRPM | TEMPERATURE: 98 F | SYSTOLIC BLOOD PRESSURE: 151 MMHG | HEART RATE: 77 BPM | BODY MASS INDEX: 21.6 KG/M2 | DIASTOLIC BLOOD PRESSURE: 67 MMHG | HEIGHT: 71 IN

## 2018-02-20 DIAGNOSIS — D63.0 ANEMIA IN NEOPLASTIC DISEASE: ICD-10-CM

## 2018-02-20 DIAGNOSIS — T38.0X5A STEROID-INDUCED DIABETES: ICD-10-CM

## 2018-02-20 DIAGNOSIS — R91.8 PULMONARY NODULES: ICD-10-CM

## 2018-02-20 DIAGNOSIS — E09.9 STEROID-INDUCED DIABETES: ICD-10-CM

## 2018-02-20 DIAGNOSIS — C61 PROSTATE CANCER: Primary | ICD-10-CM

## 2018-02-20 DIAGNOSIS — C79.51 BONE METASTASES: ICD-10-CM

## 2018-02-20 LAB
ANISOCYTOSIS BLD QL SMEAR: SLIGHT
BASOPHILS # BLD AUTO: 0.02 K/UL
BASOPHILS NFR BLD: 0.4 %
DIFFERENTIAL METHOD: ABNORMAL
EOSINOPHIL # BLD AUTO: 0.1 K/UL
EOSINOPHIL NFR BLD: 1.6 %
ERYTHROCYTE [DISTWIDTH] IN BLOOD BY AUTOMATED COUNT: 18.7 %
HCT VFR BLD AUTO: 33.9 %
HGB BLD-MCNC: 10.7 G/DL
HYPOCHROMIA BLD QL SMEAR: ABNORMAL
LYMPHOCYTES # BLD AUTO: 0.9 K/UL
LYMPHOCYTES NFR BLD: 17.1 %
MCH RBC QN AUTO: 31.3 PG
MCHC RBC AUTO-ENTMCNC: 31.6 G/DL
MCV RBC AUTO: 99 FL
MONOCYTES # BLD AUTO: 0.4 K/UL
MONOCYTES NFR BLD: 7.6 %
NEUTROPHILS # BLD AUTO: 4 K/UL
NEUTROPHILS NFR BLD: 73.3 %
NRBC BLD-RTO: 0 /100 WBC
OVALOCYTES BLD QL SMEAR: ABNORMAL
PLATELET # BLD AUTO: 116 K/UL
PLATELET BLD QL SMEAR: ABNORMAL
PMV BLD AUTO: 11.7 FL
POIKILOCYTOSIS BLD QL SMEAR: SLIGHT
POLYCHROMASIA BLD QL SMEAR: ABNORMAL
RBC # BLD AUTO: 3.42 M/UL
WBC # BLD AUTO: 5.5 K/UL

## 2018-02-20 PROCEDURE — 1125F AMNT PAIN NOTED PAIN PRSNT: CPT | Mod: S$GLB,,, | Performed by: INTERNAL MEDICINE

## 2018-02-20 PROCEDURE — 99999 PR PBB SHADOW E&M-EST. PATIENT-LVL III: CPT | Mod: PBBFAC,,, | Performed by: INTERNAL MEDICINE

## 2018-02-20 PROCEDURE — 85025 COMPLETE CBC W/AUTO DIFF WBC: CPT | Mod: PN

## 2018-02-20 PROCEDURE — 36415 COLL VENOUS BLD VENIPUNCTURE: CPT | Mod: PN

## 2018-02-20 PROCEDURE — 99213 OFFICE O/P EST LOW 20 MIN: CPT | Mod: S$GLB,,, | Performed by: INTERNAL MEDICINE

## 2018-02-20 PROCEDURE — 1159F MED LIST DOCD IN RCRD: CPT | Mod: S$GLB,,, | Performed by: INTERNAL MEDICINE

## 2018-02-20 PROCEDURE — 3008F BODY MASS INDEX DOCD: CPT | Mod: S$GLB,,, | Performed by: INTERNAL MEDICINE

## 2018-02-20 PROCEDURE — 85025 COMPLETE CBC W/AUTO DIFF WBC: CPT

## 2018-02-21 NOTE — PROGRESS NOTES
Date of Service: 02/20/2018  HISTORY OF PRESENT ILLNESS:  An 82-year-old white gentleman well known to me for   metastatic prostate carcinoma involving bone, who has been clinically stable on   abiraterone/prednisone/quarterly Xgeva.  He returns to clinic today earlier   than scheduled followup with difficulties with glucose running high.  He has   been seen and evaluated by Dr. Casiano in regard to this and given instructions   for how to adjust his medications and a sliding scale fashion for better   control.  The patient's course has also been complicated by treatment-associated   anemia of which he is taking Procrit.  He had an interval CBC in anticipation   of today's appointment.  No other pertinent findings on a 10-point review of   systems.    PHYSICAL EXAMINATION:  GENERAL:  Well-developed, well-nourished elderly white gentleman who is   ambulating with the assistance of a cane and has a weight of 154-1/2 pounds   (increased by 1-1/2 pounds).  VITAL SIGNS:  Documented in EMR and reviewed.  HEENT:  Normocephalic, atraumatic.  Oral mucosa pink and moist.  Lips without   lesions.  Tongue midline.  Oropharynx clear.  Nonicteric sclerae.   NECK:  Supple.  No adenopathy.  HEART:  Regular rate and rhythm without murmur, gallop or rub.               LUNGS:  Clear to auscultation bilaterally.                                   ABDOMEN:  Soft, nontender and nondistended with positive normoactive bowel   sounds.  No hepatosplenomegaly.  EXTREMITIES:  No cyanosis, clubbing or edema.  Distal pulses are intact.    LABORATORY:  White count 5.5, H and H 10.7 and 33.9, platelet count of 116.    IMPRESSION:  1.  Metastatic prostate carcinoma involving bone -- clinically stable.  2.  Indeterminate pulmonary nodules, which have been stable over serial imaging.  3.  Treatment-associated anemia, not currently in need of Procrit.  4.  Labile blood sugar, now on sliding scale protocol per Dr. Casiano.    PLAN:  1.  Continue with  current cycle of abiraterone/prednisone.  2.  Hold Procrit this week and next as patient's hemoglobin is in excess of 10.  3.  Return to clinic in two weeks with interval CBC, CMP, LDH, mag, and PSA   prior to 32nd cycle of therapy.      FADUMO/HN  dd: 02/20/2018 10:10:31 (CST)  td: 02/21/2018 00:13:53 (CST)  Doc ID   #[id number]  Job ID #[id number]    CC:

## 2018-02-27 ENCOUNTER — TELEPHONE (OUTPATIENT)
Dept: VASCULAR SURGERY | Facility: CLINIC | Age: 83
End: 2018-02-27

## 2018-02-27 NOTE — TELEPHONE ENCOUNTER
----- Message from Renita Barahona sent at 2/27/2018  9:35 AM CST -----  Contact: self  Patient needs to get in for a yearly follow up and patient was coming to [place] tomorrow with his wife and wanted to know if he could possibly get in to see  tomorrow     Please call back [phone]

## 2018-02-28 ENCOUNTER — OFFICE VISIT (OUTPATIENT)
Dept: VASCULAR SURGERY | Facility: CLINIC | Age: 83
End: 2018-02-28
Payer: MEDICARE

## 2018-02-28 VITALS
HEART RATE: 60 BPM | WEIGHT: 153.69 LBS | SYSTOLIC BLOOD PRESSURE: 131 MMHG | DIASTOLIC BLOOD PRESSURE: 64 MMHG | BODY MASS INDEX: 21.43 KG/M2

## 2018-02-28 DIAGNOSIS — I73.9 PAD (PERIPHERAL ARTERY DISEASE): Primary | ICD-10-CM

## 2018-02-28 DIAGNOSIS — G62.9 NEUROPATHY: ICD-10-CM

## 2018-02-28 PROCEDURE — 99024 POSTOP FOLLOW-UP VISIT: CPT | Mod: S$GLB,,, | Performed by: THORACIC SURGERY (CARDIOTHORACIC VASCULAR SURGERY)

## 2018-02-28 PROCEDURE — 99999 PR PBB SHADOW E&M-EST. PATIENT-LVL II: CPT | Mod: PBBFAC,,, | Performed by: THORACIC SURGERY (CARDIOTHORACIC VASCULAR SURGERY)

## 2018-02-28 RX ORDER — GABAPENTIN 300 MG/1
300 CAPSULE ORAL 2 TIMES DAILY
Qty: 60 CAPSULE | Refills: 11 | Status: SHIPPED | OUTPATIENT
Start: 2018-02-28 | End: 2019-11-01

## 2018-03-01 NOTE — PROGRESS NOTES
OFFICE NOTE    HISTORY OF PRESENT ILLNESS:  This is an 82-year-old gentleman with peripheral   arterial disease.  He has a remote history of femoral popliteal and popliteal   tibial bypass that up to six months ago has been patent.  He has some tingling   in the right foot at rest and with walking, but this has not been progress.  He   has metastatic prostate cancer for which he is on chemotherapy.  Medicines are   noted, they are part of the recent Epic record.  His problem list is reviewed.    On exam, perfusion to the legs and feet is satisfactory.  He actually has a   palpable dorsalis pedis pulse.  The left foot is warm as well.  At this point,   we will check an arterial duplex of the extremities to check the bypass graft,   see if there are any problems with it.    I will prescribe Neurontin for what feels probably neuropathy.  Based on results   of the ultrasound, we will make further recommendations, but I have encouraged   him to exercise and walk as tolerated.      ANA  dd: 02/28/2018 08:26:23 (CST)  td: 03/01/2018 00:35:49 (CST)  Doc ID   #[id number]  Job ID #[id number]    CC:

## 2018-03-06 ENCOUNTER — OFFICE VISIT (OUTPATIENT)
Dept: HEMATOLOGY/ONCOLOGY | Facility: CLINIC | Age: 83
End: 2018-03-06
Payer: MEDICARE

## 2018-03-06 ENCOUNTER — LAB VISIT (OUTPATIENT)
Dept: LAB | Facility: HOSPITAL | Age: 83
End: 2018-03-06
Attending: INTERNAL MEDICINE
Payer: MEDICARE

## 2018-03-06 VITALS
RESPIRATION RATE: 18 BRPM | BODY MASS INDEX: 21.64 KG/M2 | HEART RATE: 63 BPM | DIASTOLIC BLOOD PRESSURE: 57 MMHG | HEIGHT: 71 IN | SYSTOLIC BLOOD PRESSURE: 119 MMHG | WEIGHT: 154.56 LBS | TEMPERATURE: 98 F

## 2018-03-06 DIAGNOSIS — C61 PROSTATE CANCER: Primary | ICD-10-CM

## 2018-03-06 DIAGNOSIS — D63.0 ANEMIA IN NEOPLASTIC DISEASE: ICD-10-CM

## 2018-03-06 DIAGNOSIS — R91.8 PULMONARY NODULES: ICD-10-CM

## 2018-03-06 DIAGNOSIS — C79.51 BONE METASTASES: ICD-10-CM

## 2018-03-06 DIAGNOSIS — C61 PROSTATE CANCER: ICD-10-CM

## 2018-03-06 LAB
ALBUMIN SERPL BCP-MCNC: 3.6 G/DL
ALP SERPL-CCNC: 59 U/L
ALT SERPL W/O P-5'-P-CCNC: 26 U/L
ANION GAP SERPL CALC-SCNC: 10 MMOL/L
AST SERPL-CCNC: 17 U/L
BASOPHILS # BLD AUTO: 0.03 K/UL
BASOPHILS NFR BLD: 0.5 %
BILIRUB SERPL-MCNC: 0.4 MG/DL
BUN SERPL-MCNC: 36 MG/DL
CALCIUM SERPL-MCNC: 9.7 MG/DL
CHLORIDE SERPL-SCNC: 106 MMOL/L
CO2 SERPL-SCNC: 25 MMOL/L
COMPLEXED PSA SERPL-MCNC: 0.1 NG/ML
CREAT SERPL-MCNC: 1.09 MG/DL
DIFFERENTIAL METHOD: ABNORMAL
EOSINOPHIL # BLD AUTO: 0.1 K/UL
EOSINOPHIL NFR BLD: 1.1 %
ERYTHROCYTE [DISTWIDTH] IN BLOOD BY AUTOMATED COUNT: 16.2 %
EST. GFR  (AFRICAN AMERICAN): >60 ML/MIN/1.73 M^2
EST. GFR  (NON AFRICAN AMERICAN): >60 ML/MIN/1.73 M^2
GLUCOSE SERPL-MCNC: 128 MG/DL
HCT VFR BLD AUTO: 35.5 %
HGB BLD-MCNC: 11.1 G/DL
LDH SERPL L TO P-CCNC: 411 U/L
LYMPHOCYTES # BLD AUTO: 1 K/UL
LYMPHOCYTES NFR BLD: 15.1 %
MAGNESIUM SERPL-MCNC: 1.9 MG/DL
MCH RBC QN AUTO: 31.1 PG
MCHC RBC AUTO-ENTMCNC: 31.3 G/DL
MCV RBC AUTO: 99 FL
MONOCYTES # BLD AUTO: 0.4 K/UL
MONOCYTES NFR BLD: 5.9 %
NEUTROPHILS # BLD AUTO: 5.1 K/UL
NEUTROPHILS NFR BLD: 77.4 %
NRBC BLD-RTO: 0 /100 WBC
PLATELET # BLD AUTO: 137 K/UL
PMV BLD AUTO: 11.6 FL
POTASSIUM SERPL-SCNC: 4.2 MMOL/L
PROT SERPL-MCNC: 6.2 G/DL
RBC # BLD AUTO: 3.57 M/UL
SODIUM SERPL-SCNC: 141 MMOL/L
WBC # BLD AUTO: 6.56 K/UL

## 2018-03-06 PROCEDURE — 80053 COMPREHEN METABOLIC PANEL: CPT

## 2018-03-06 PROCEDURE — 83615 LACTATE (LD) (LDH) ENZYME: CPT

## 2018-03-06 PROCEDURE — 36415 COLL VENOUS BLD VENIPUNCTURE: CPT | Mod: PN

## 2018-03-06 PROCEDURE — 85025 COMPLETE CBC W/AUTO DIFF WBC: CPT

## 2018-03-06 PROCEDURE — 83615 LACTATE (LD) (LDH) ENZYME: CPT | Mod: PN

## 2018-03-06 PROCEDURE — 85025 COMPLETE CBC W/AUTO DIFF WBC: CPT | Mod: PN

## 2018-03-06 PROCEDURE — 3078F DIAST BP <80 MM HG: CPT | Mod: S$GLB,,, | Performed by: INTERNAL MEDICINE

## 2018-03-06 PROCEDURE — 3074F SYST BP LT 130 MM HG: CPT | Mod: S$GLB,,, | Performed by: INTERNAL MEDICINE

## 2018-03-06 PROCEDURE — 80053 COMPREHEN METABOLIC PANEL: CPT | Mod: PN

## 2018-03-06 PROCEDURE — 84153 ASSAY OF PSA TOTAL: CPT | Mod: PN

## 2018-03-06 PROCEDURE — 84153 ASSAY OF PSA TOTAL: CPT

## 2018-03-06 PROCEDURE — 83735 ASSAY OF MAGNESIUM: CPT

## 2018-03-06 PROCEDURE — 83735 ASSAY OF MAGNESIUM: CPT | Mod: PN

## 2018-03-06 PROCEDURE — 99999 PR PBB SHADOW E&M-EST. PATIENT-LVL III: CPT | Mod: PBBFAC,,, | Performed by: INTERNAL MEDICINE

## 2018-03-06 PROCEDURE — 99214 OFFICE O/P EST MOD 30 MIN: CPT | Mod: S$GLB,,, | Performed by: INTERNAL MEDICINE

## 2018-03-07 NOTE — PROGRESS NOTES
Date of Service: 03/06/2018  This is an 82-year-old white gentleman well known to me for metastatic prostate   carcinoma involving bone, who remains on abiraterone and prednisone.  The   patient is in clinic today to receive 32nd cycle of therapy.  His major   complaint is that of lower extremity weakness he had one day since his last   therapy cycle where it was very difficult for him to get up and ambulate.  He   has discontinued formal physical therapy, but he is exercising at home on his   own per their protocol.    No other complaints or pertinent on a 14-point review of systems.    PHYSICAL EXAMINATION:  GENERAL:  Well-developed, elderly, thin-appearing, white gentleman in no acute   distress.  VITAL SIGNS:  Weight 154-1/2 pounds (stable).  HEENT:  Normocephalic, atraumatic.  Oral mucosa pink and moist.  Lips without   lesions.  Tongue midline.  Oropharynx clear.  Nonicteric sclerae.   NECK:  Supple, no adenopathy.  No carotid bruits, thyromegaly or thyroid nodule.                                                                        HEART:  Regular rate and rhythm without murmur, gallop or rub.                LUNGS:  Clear to auscultation bilaterally.  Normal respiratory effort.       ABDOMEN:  Soft, nontender, nondistended with positive normoactive bowel sounds,   no hepatosplenomegaly.    EXTREMITIES:  No cyanosis, clubbing or edema.  Distal pulses are intact.                                              AXILLAE AND GROIN:  No palpable pathologic lymphadenopathy is appreciated.        SKIN:  Intact/turgor normal                                                              NEUROLOGIC:  Cranial nerves II-XII grossly intact.  Motor:  Good muscle bulk and   tone.  Strength/sensory 5/5 throughout.  Gait stable.     LABORATORY:  White count 6.6, H and H 11.1 and 35.5, platelets 137.  Sodium 141,   potassium 4.2, chloride 106, CO2 25, BUN 36, creatinine 1.1, glucose 128,   calcium 9.7, mag 1.9.  Liver function  tests are within normal limits.  LDH is   411.  PSA is stable at 0.10.    IMPRESSION:  1.  Metastatic prostate carcinoma involving bone -- remains clinically stable.  2.  Indeterminate pulmonary nodules, which have been stable on serial imaging.  3.  Treatment-associated anemia, not in need of Procrit.  4.  Lower extremity weakness, likely a complication of therapy.    PLAN:  1.  I have discussed with the patient the possibility of taking a holiday from   abiraterone/prednisone in light of his complaints of lower extremity weakness.    He is not particularly motivated toward that suggestion at this time, but will   consider it.  2.  Therefore, proceed with third cycle of therapy and reevaluate four weeks   from now with interval CBC, CMP, LDH, mag, and PSA prior to appointment.      FADUMO/HN  dd: 03/06/2018 12:16:20 (CST)  td: 03/07/2018 03:40:26 (CST)  Doc ID   #[id number]  Job ID #[id number]    CC:

## 2018-03-13 ENCOUNTER — HOSPITAL ENCOUNTER (OUTPATIENT)
Dept: RADIOLOGY | Facility: HOSPITAL | Age: 83
Discharge: HOME | End: 2018-03-13
Attending: THORACIC SURGERY (CARDIOTHORACIC VASCULAR SURGERY)
Payer: MEDICARE

## 2018-03-13 DIAGNOSIS — I73.9 PERIPHERAL VASCULAR DISEASE, UNSPECIFIED: ICD-10-CM

## 2018-03-13 PROCEDURE — 93922 UPR/L XTREMITY ART 2 LEVELS: CPT | Mod: TC,PO

## 2018-03-13 PROCEDURE — 93925 LOWER EXTREMITY STUDY: CPT | Mod: 26,,, | Performed by: RADIOLOGY

## 2018-03-13 PROCEDURE — 93922 UPR/L XTREMITY ART 2 LEVELS: CPT | Mod: 26,,, | Performed by: RADIOLOGY

## 2018-03-19 DIAGNOSIS — C61 PROSTATE CANCER: ICD-10-CM

## 2018-03-19 DIAGNOSIS — R19.7 DIARRHEA, UNSPECIFIED TYPE: ICD-10-CM

## 2018-03-19 DIAGNOSIS — C79.51 BONE METASTASES: ICD-10-CM

## 2018-03-19 DIAGNOSIS — E86.0 DEHYDRATION: ICD-10-CM

## 2018-03-19 RX ORDER — PREDNISONE 10 MG/1
10 TABLET ORAL DAILY
Qty: 30 TABLET | Refills: 2 | Status: SHIPPED | OUTPATIENT
Start: 2018-03-19 | End: 2018-04-24

## 2018-03-19 RX ORDER — ABIRATERONE ACETATE 250 MG/1
1000 TABLET ORAL DAILY
Qty: 112 TABLET | Refills: 2 | Status: SHIPPED | OUTPATIENT
Start: 2018-03-19 | End: 2018-04-03

## 2018-04-03 ENCOUNTER — LAB VISIT (OUTPATIENT)
Dept: LAB | Facility: HOSPITAL | Age: 83
End: 2018-04-03
Attending: INTERNAL MEDICINE
Payer: MEDICARE

## 2018-04-03 ENCOUNTER — OFFICE VISIT (OUTPATIENT)
Dept: HEMATOLOGY/ONCOLOGY | Facility: CLINIC | Age: 83
End: 2018-04-03
Payer: MEDICARE

## 2018-04-03 VITALS
SYSTOLIC BLOOD PRESSURE: 155 MMHG | HEIGHT: 71 IN | DIASTOLIC BLOOD PRESSURE: 68 MMHG | HEART RATE: 63 BPM | RESPIRATION RATE: 20 BRPM | TEMPERATURE: 97 F | BODY MASS INDEX: 22.25 KG/M2 | WEIGHT: 158.94 LBS

## 2018-04-03 DIAGNOSIS — C61 PROSTATE CANCER METASTATIC TO BONE: Primary | ICD-10-CM

## 2018-04-03 DIAGNOSIS — C79.51 BONE METASTASES: ICD-10-CM

## 2018-04-03 DIAGNOSIS — R91.8 PULMONARY NODULES: ICD-10-CM

## 2018-04-03 DIAGNOSIS — C79.51 PROSTATE CANCER METASTATIC TO BONE: Primary | ICD-10-CM

## 2018-04-03 DIAGNOSIS — D63.0 ANEMIA IN NEOPLASTIC DISEASE: ICD-10-CM

## 2018-04-03 DIAGNOSIS — C61 PROSTATE CANCER: ICD-10-CM

## 2018-04-03 LAB
ALBUMIN SERPL BCP-MCNC: 3.4 G/DL
ALP SERPL-CCNC: 62 U/L
ALT SERPL W/O P-5'-P-CCNC: 21 U/L
ANION GAP SERPL CALC-SCNC: 9 MMOL/L
AST SERPL-CCNC: 19 U/L
BASOPHILS # BLD AUTO: 0.02 K/UL
BASOPHILS NFR BLD: 0.4 %
BILIRUB SERPL-MCNC: 0.3 MG/DL
BUN SERPL-MCNC: 38 MG/DL
CALCIUM SERPL-MCNC: 10 MG/DL
CHLORIDE SERPL-SCNC: 105 MMOL/L
CO2 SERPL-SCNC: 29 MMOL/L
COMPLEXED PSA SERPL-MCNC: 0.09 NG/ML
CREAT SERPL-MCNC: 1.11 MG/DL
DIFFERENTIAL METHOD: ABNORMAL
EOSINOPHIL # BLD AUTO: 0.1 K/UL
EOSINOPHIL NFR BLD: 1.9 %
ERYTHROCYTE [DISTWIDTH] IN BLOOD BY AUTOMATED COUNT: 15 %
EST. GFR  (AFRICAN AMERICAN): >60 ML/MIN/1.73 M^2
EST. GFR  (NON AFRICAN AMERICAN): >60 ML/MIN/1.73 M^2
GLUCOSE SERPL-MCNC: 107 MG/DL
HCT VFR BLD AUTO: 31.7 %
HGB BLD-MCNC: 10.1 G/DL
LDH SERPL L TO P-CCNC: 436 U/L
LYMPHOCYTES # BLD AUTO: 0.9 K/UL
LYMPHOCYTES NFR BLD: 16.6 %
MAGNESIUM SERPL-MCNC: 1.9 MG/DL
MCH RBC QN AUTO: 30.7 PG
MCHC RBC AUTO-ENTMCNC: 31.9 G/DL
MCV RBC AUTO: 96 FL
MONOCYTES # BLD AUTO: 0.4 K/UL
MONOCYTES NFR BLD: 6.9 %
NEUTROPHILS # BLD AUTO: 4 K/UL
NEUTROPHILS NFR BLD: 74.2 %
NRBC BLD-RTO: 0 /100 WBC
PLATELET # BLD AUTO: 112 K/UL
PMV BLD AUTO: 12 FL
POTASSIUM SERPL-SCNC: 3.9 MMOL/L
PROT SERPL-MCNC: 5.7 G/DL
RBC # BLD AUTO: 3.29 M/UL
SODIUM SERPL-SCNC: 143 MMOL/L
WBC # BLD AUTO: 5.37 K/UL

## 2018-04-03 PROCEDURE — 99999 PR PBB SHADOW E&M-EST. PATIENT-LVL III: CPT | Mod: PBBFAC,,, | Performed by: NURSE PRACTITIONER

## 2018-04-03 PROCEDURE — 3077F SYST BP >= 140 MM HG: CPT | Mod: CPTII,S$GLB,, | Performed by: NURSE PRACTITIONER

## 2018-04-03 PROCEDURE — 83615 LACTATE (LD) (LDH) ENZYME: CPT | Mod: PN

## 2018-04-03 PROCEDURE — 84153 ASSAY OF PSA TOTAL: CPT

## 2018-04-03 PROCEDURE — 83615 LACTATE (LD) (LDH) ENZYME: CPT

## 2018-04-03 PROCEDURE — 85025 COMPLETE CBC W/AUTO DIFF WBC: CPT | Mod: PN

## 2018-04-03 PROCEDURE — 83735 ASSAY OF MAGNESIUM: CPT | Mod: PN

## 2018-04-03 PROCEDURE — 83735 ASSAY OF MAGNESIUM: CPT

## 2018-04-03 PROCEDURE — 3078F DIAST BP <80 MM HG: CPT | Mod: CPTII,S$GLB,, | Performed by: NURSE PRACTITIONER

## 2018-04-03 PROCEDURE — 36415 COLL VENOUS BLD VENIPUNCTURE: CPT | Mod: PN

## 2018-04-03 PROCEDURE — 80053 COMPREHEN METABOLIC PANEL: CPT

## 2018-04-03 PROCEDURE — 84153 ASSAY OF PSA TOTAL: CPT | Mod: PN

## 2018-04-03 PROCEDURE — 85025 COMPLETE CBC W/AUTO DIFF WBC: CPT

## 2018-04-03 PROCEDURE — 99214 OFFICE O/P EST MOD 30 MIN: CPT | Mod: S$GLB,,, | Performed by: NURSE PRACTITIONER

## 2018-04-03 PROCEDURE — 80053 COMPREHEN METABOLIC PANEL: CPT | Mod: PN

## 2018-04-03 NOTE — PROGRESS NOTES
HISTORY OF PRESENT ILLNESS:  This is an 82-year-old gentleman well known to Dr. Fernandez   for metastatic prostate carcinoma involving bone.  The patient also has indeterminate pulmonary nodules.    He is managed with Abiraterone/prednisone/quarterly Xgeva.  He returns to clinic for evaluation   prior to cycle 33 of treatment.  He complains of weakness in his lower extremities and disequilibrium   that is affecting his quality of life.  He has decided to hold off of treatment (Abiaterone/prednisone)   for a while.  He has discussed this with Dr. Fernandez and he is ready to proceed.  He denies any fevers,   chills, drenching night sweats, unexplained weight loss, abdominal discomfort, nausea, vomiting,   mouth sores, etc.  No other complaints or pertinent findings on a 14-point review of systems.    PHYSICAL EXAMINATION:  GENERAL:  Well-developed, elderly, frail-appearing white gentleman in no acute distress.  Alert & oriented x 3.  VITAL SIGNS:  Weight:  Gain of 4 1/2 pounds/4 weeks  Wt Readings from Last 3 Encounters:   04/03/18 72.1 kg (158 lb 15.2 oz)   03/06/18 70.1 kg (154 lb 8.7 oz)   02/28/18 69.7 kg (153 lb 10.6 oz)     Temp Readings from Last 3 Encounters:   04/03/18 97.4 °F (36.3 °C)   03/06/18 98.1 °F (36.7 °C)   02/20/18 97.6 °F (36.4 °C)     BP Readings from Last 3 Encounters:   04/03/18 (!) 155/68   03/06/18 (!) 119/57   02/28/18 131/64     Pulse Readings from Last 3 Encounters:   04/03/18 63   03/06/18 63   02/28/18 60     HEENT:  Normocephalic, atraumatic.  Oral mucosa pink and moist.  Lips without   lesions.  Tongue midline.  Oropharynx clear.  Nonicteric sclerae.   NECK:  Supple, no adenopathy.  No carotid bruits, thyromegaly or thyroid nodule  HEART:  Regular rate and rhythm without murmur, gallop or rub.   LUNGS:  Clear to auscultation bilaterally.  Normal respiratory effort.       ABDOMEN:  Soft, nontender, nondistended with positive normoactive bowel sounds,   no hepatosplenomegaly.  EXTREMITIES:   Mild edema to RLE, no cyanosis or clubbing.  Distal pulses are intact.  AXILLAE AND GROIN:  No palpable pathologic lymphadenopathy is appreciated.  SKIN:  Intact/turgor normal.  NEUROLOGIC:  Cranial nerves II-XII grossly intact.  Motor:  Good muscle bulk and   tone.  Strength/sensory 5/5 throughout.     LABORATORY DATA:    Lab Results   Component Value Date    WBC 5.37 04/03/2018    HGB 10.1 (L) 04/03/2018    HCT 31.7 (L) 04/03/2018    MCV 96 04/03/2018     (L) 04/03/2018     Differential remarkable for 74.2% grans, 16.6% lymph    CMP  Sodium   Date Value Ref Range Status   04/03/2018 143 136 - 145 mmol/L Final     Potassium   Date Value Ref Range Status   04/03/2018 3.9 3.5 - 5.1 mmol/L Final     Chloride   Date Value Ref Range Status   04/03/2018 105 95 - 110 mmol/L Final     CO2   Date Value Ref Range Status   04/03/2018 29 22 - 31 mmol/L Final     Glucose   Date Value Ref Range Status   04/03/2018 107 70 - 110 mg/dL Final     Comment:     The ADA recommends the following guidelines for fasting glucose:  Normal:       less than 100 mg/dL  Prediabetes:  100 mg/dL to 125 mg/dL  Diabetes:     126 mg/dL or higher       BUN, Bld   Date Value Ref Range Status   04/03/2018 38 (H) 9 - 21 mg/dL Final     Creatinine   Date Value Ref Range Status   04/03/2018 1.11 0.50 - 1.40 mg/dL Final     Calcium   Date Value Ref Range Status   04/03/2018 10.0 8.4 - 10.2 mg/dL Final     Total Protein   Date Value Ref Range Status   04/03/2018 5.7 (L) 6.0 - 8.4 g/dL Final     Albumin   Date Value Ref Range Status   04/03/2018 3.4 (L) 3.5 - 5.2 g/dL Final     Total Bilirubin   Date Value Ref Range Status   04/03/2018 0.3 0.2 - 1.3 mg/dL Final     Alkaline Phosphatase   Date Value Ref Range Status   04/03/2018 62 38 - 145 U/L Final     AST   Date Value Ref Range Status   04/03/2018 19 17 - 59 U/L Final     ALT   Date Value Ref Range Status   04/03/2018 21 10 - 44 U/L Final     Anion Gap   Date Value Ref Range Status   04/03/2018 9 8  - 16 mmol/L Final     eGFR if    Date Value Ref Range Status   04/03/2018 >60 >60 mL/min/1.73 m^2 Final     eGFR if non    Date Value Ref Range Status   04/03/2018 >60 >60 mL/min/1.73 m^2 Final     Comment:     Calculation used to obtain the estimated glomerular filtration  rate (eGFR) is the CKD-EPI equation.        , Magnesium 1.9  PSA 0.09    IMPRESSION:  1.  Metastatic prostate carcinoma involving bone -- clinically stable.  2.  Indeterminate pulmonary nodules -- stable.  3.  Treatment-associated anemia, not in need of Procrit.  4.  Mild treatment associated thrombocytopenia, not in need of intervention.    PLAN:  1.  HOLD Abiraterone/Prednisone in light of lower extremity weakness/disequilibrium; Dr. Fernandez aware.  2.  Continue XGEVA - due 04/10/18  3.  Continue calcium supplementation with vitamin D.  4.  Return to clinic in 3 months from 04/10/18 with interval CBC, CMP, LDH, magnesium and PSA prior.      Assessment/plan reviewed and approved by Dr. Fernandez.

## 2018-04-24 ENCOUNTER — PROCEDURE VISIT (OUTPATIENT)
Dept: UROLOGY | Facility: CLINIC | Age: 83
End: 2018-04-24
Payer: MEDICARE

## 2018-04-24 VITALS
BODY MASS INDEX: 21.94 KG/M2 | SYSTOLIC BLOOD PRESSURE: 118 MMHG | DIASTOLIC BLOOD PRESSURE: 70 MMHG | HEIGHT: 71 IN | WEIGHT: 156.75 LBS | HEART RATE: 69 BPM

## 2018-04-24 DIAGNOSIS — R33.9 INCOMPLETE BLADDER EMPTYING: ICD-10-CM

## 2018-04-24 DIAGNOSIS — C67.9 MALIGNANT NEOPLASM OF URINARY BLADDER, UNSPECIFIED SITE: Primary | ICD-10-CM

## 2018-04-24 DIAGNOSIS — C61 MALIGNANT NEOPLASM OF PROSTATE: ICD-10-CM

## 2018-04-24 LAB
BILIRUB SERPL-MCNC: NORMAL MG/DL
BLOOD URINE, POC: NORMAL
COLOR, POC UA: YELLOW
GLUCOSE UR QL STRIP: NORMAL
KETONES UR QL STRIP: NORMAL
LEUKOCYTE ESTERASE URINE, POC: NORMAL
NITRITE, POC UA: NORMAL
PH, POC UA: 6.5
PROTEIN, POC: NORMAL
SPECIFIC GRAVITY, POC UA: 1.01
UROBILINOGEN, POC UA: NORMAL

## 2018-04-24 PROCEDURE — 51798 US URINE CAPACITY MEASURE: CPT | Mod: S$GLB,,, | Performed by: UROLOGY

## 2018-04-24 PROCEDURE — 52000 CYSTOURETHROSCOPY: CPT | Mod: S$GLB,,, | Performed by: UROLOGY

## 2018-04-24 PROCEDURE — 81002 URINALYSIS NONAUTO W/O SCOPE: CPT | Mod: S$GLB,,, | Performed by: UROLOGY

## 2018-04-24 RX ORDER — SAW PALMETTO 160 MG
160 CAPSULE ORAL 2 TIMES DAILY
COMMUNITY

## 2018-05-23 DIAGNOSIS — I65.23 BILATERAL CAROTID ARTERY STENOSIS: Primary | ICD-10-CM

## 2018-06-07 ENCOUNTER — HOSPITAL ENCOUNTER (OUTPATIENT)
Dept: RADIOLOGY | Facility: HOSPITAL | Age: 83
Discharge: HOME | End: 2018-06-07
Attending: THORACIC SURGERY (CARDIOTHORACIC VASCULAR SURGERY)
Payer: MEDICARE

## 2018-06-07 DIAGNOSIS — I65.23 BILATERAL CAROTID ARTERY STENOSIS: ICD-10-CM

## 2018-06-07 PROCEDURE — 93880 EXTRACRANIAL BILAT STUDY: CPT | Mod: TC,PO

## 2018-06-07 PROCEDURE — 93880 EXTRACRANIAL BILAT STUDY: CPT | Mod: 26,,, | Performed by: RADIOLOGY

## 2018-06-19 ENCOUNTER — OFFICE VISIT (OUTPATIENT)
Dept: VASCULAR SURGERY | Facility: CLINIC | Age: 83
End: 2018-06-19
Payer: MEDICARE

## 2018-06-19 VITALS
DIASTOLIC BLOOD PRESSURE: 68 MMHG | SYSTOLIC BLOOD PRESSURE: 108 MMHG | BODY MASS INDEX: 21.14 KG/M2 | WEIGHT: 151 LBS | HEIGHT: 71 IN | HEART RATE: 86 BPM

## 2018-06-19 DIAGNOSIS — Z98.890 HISTORY OF CAROTID ENDARTERECTOMY: ICD-10-CM

## 2018-06-19 PROCEDURE — 3078F DIAST BP <80 MM HG: CPT | Mod: CPTII,S$GLB,, | Performed by: THORACIC SURGERY (CARDIOTHORACIC VASCULAR SURGERY)

## 2018-06-19 PROCEDURE — 3074F SYST BP LT 130 MM HG: CPT | Mod: CPTII,S$GLB,, | Performed by: THORACIC SURGERY (CARDIOTHORACIC VASCULAR SURGERY)

## 2018-06-19 PROCEDURE — 99213 OFFICE O/P EST LOW 20 MIN: CPT | Mod: S$GLB,,, | Performed by: THORACIC SURGERY (CARDIOTHORACIC VASCULAR SURGERY)

## 2018-06-19 PROCEDURE — 99999 PR PBB SHADOW E&M-EST. PATIENT-LVL III: CPT | Mod: PBBFAC,,, | Performed by: THORACIC SURGERY (CARDIOTHORACIC VASCULAR SURGERY)

## 2018-06-19 RX ORDER — TAMSULOSIN HYDROCHLORIDE 0.4 MG/1
1 CAPSULE ORAL DAILY
Refills: 3 | COMMUNITY
Start: 2018-06-04 | End: 2018-11-15

## 2018-06-19 NOTE — PROGRESS NOTES
Subjective:       Patient ID: Cameron Bridges is a 82 y.o. male.    Chief Complaint: Carotid Artery Disease (F/U Carotid US/HX L/CEA 2010/R/stent 2000)     Patient had undergone left carotid endarterectomy in 2010 and right carotid stent placement as well.  He has had no lateralizing neurologic complaints.  He has had some chronic weakness in his legs mostly associated with his chemotherapy for prostate cancer.  He denies amaurosis fugax.      Review of Systems   Constitutional: Negative for appetite change.        He is drinking protein shakes daily   HENT: Negative for hearing loss and trouble swallowing.    Eyes:        No amaurosis fugax   Respiratory: Negative for cough and shortness of breath.    Cardiovascular: Positive for chest pain (  Mild, occurs once every 2-3 months and is resolved with 1 nitroglycerin).   Gastrointestinal: Negative for blood in stool and vomiting.   Musculoskeletal:        Positive weakness in the muscles of his legs bilaterally.  He uses a cane to ambulate safely for balance   Neurological: Positive for weakness ( both legs). Negative for light-headedness and headaches.   Hematological: Bruises/bleeds easily.       Objective:      Physical Exam   Constitutional: He is oriented to person, place, and time.   Slender   HENT:   Head: Normocephalic.   Eyes: Pupils are equal, round, and reactive to light.   Neck: No JVD present.   Right carotid bruit   Cardiovascular: Normal rate and regular rhythm.    No murmur heard.  Pulmonary/Chest: Breath sounds normal.   Musculoskeletal: Normal range of motion.   Neurological: He is alert and oriented to person, place, and time.   Skin: Skin is warm.   Ecchymosis in skin   Psychiatric: He has a normal mood and affect. His behavior is normal. Thought content normal.       US Carotid Bilateral   Status: Final result   MyChart Results Release     [employer] Status: Active Results Release   PACS Images     Show images for US Carotid Bilateral   Reviewed By  List     Pete Law MD on 6/7/2018 14:22   External Result Report     External Result Report   Narrative     EXAMINATION:  US CAROTID BILATERAL    CLINICAL HISTORY:  Occlusion and stenosis of bilateral carotid arteries    TECHNIQUE:  Color flow Doppler and duplex Doppler imaging of the bilateral carotid and vertebral arteries was performed.  Stenosis is reported using NASCET measurement criteria.    COMPARISON:  05/26/2015    FINDINGS:  There is a right internal carotid artery stent which is widely patent.  Homogeneous plaque is present present proximal to the stent within the carotid bulb resulting in no significant stenosis.  Peak systolic velocity in the right internal carotid artery is 104 centimeters/second, diastolic velocity is 23 centimeters/second, systolic velocity ratio is 1.2, and diastolic velocity ratio is 1.8.  Estimated diameter reduction of the right carotid bulb and right internal carotid artery is less than 50% and there has been no change.  There is chronic retrograde flow in the right vertebral artery indicating a high-grade right subclavian artery stenosis.    There is minimal plaque in the left internal carotid artery resulting in no significant stenosis.  The peak systolic velocity is 93 centimeters/second, diastolic velocity is 28 centimeters/second, systolic velocity ratio is 0.8, and diastolic velocity ratio is 1.3.  Normal antegrade flow is present in the left vertebral artery.   Impression       1. Right internal carotid artery stent which is widely patent.  2. Homogeneous plaque in the distal right common carotid artery and bulb resulting in less than 50% diameter stenosis which is unchanged.  3. Minimal plaque in the left internal carotid artery resulting in no significant stenosis.  4. Chronic retrograde flow in the right vertebral artery indicating a high-grade report right subclavian artery stenosis.      Electronically signed by: Pio Tran MD  Date: 06/07/2018  Time:  11:14              Assessment:        no sign of recurrent carotid stenosis.   his carotid ultrasound has been stable over several years  Plan:        patient should continue aspirin 81 mg daily.  I see no need for follow-up carotid ultrasound since he has had no changes.    Should he develop neurologic symptoms, carotid ultrasound could be performed.   He will follow with me as needed

## 2018-07-06 DIAGNOSIS — R42 VERTIGO: ICD-10-CM

## 2018-07-06 DIAGNOSIS — R26.89 BALANCE PROBLEM: ICD-10-CM

## 2018-07-06 DIAGNOSIS — R42 DIZZINESS: Primary | ICD-10-CM

## 2018-07-10 ENCOUNTER — INFUSION (OUTPATIENT)
Dept: INFUSION THERAPY | Facility: HOSPITAL | Age: 83
End: 2018-07-10
Attending: INTERNAL MEDICINE
Payer: MEDICARE

## 2018-07-10 ENCOUNTER — OFFICE VISIT (OUTPATIENT)
Dept: HEMATOLOGY/ONCOLOGY | Facility: CLINIC | Age: 83
End: 2018-07-10
Payer: MEDICARE

## 2018-07-10 VITALS
HEART RATE: 65 BPM | DIASTOLIC BLOOD PRESSURE: 59 MMHG | BODY MASS INDEX: 21.91 KG/M2 | TEMPERATURE: 98 F | RESPIRATION RATE: 20 BRPM | HEIGHT: 71 IN | WEIGHT: 156.5 LBS | SYSTOLIC BLOOD PRESSURE: 120 MMHG

## 2018-07-10 VITALS
HEART RATE: 65 BPM | DIASTOLIC BLOOD PRESSURE: 59 MMHG | RESPIRATION RATE: 20 BRPM | SYSTOLIC BLOOD PRESSURE: 120 MMHG | TEMPERATURE: 98 F

## 2018-07-10 DIAGNOSIS — C61 PROSTATE CANCER METASTATIC TO BONE: Primary | ICD-10-CM

## 2018-07-10 DIAGNOSIS — D69.59 CHEMOTHERAPY-INDUCED THROMBOCYTOPENIA: ICD-10-CM

## 2018-07-10 DIAGNOSIS — C79.51 BONE METASTASES: Primary | ICD-10-CM

## 2018-07-10 DIAGNOSIS — C61 PROSTATE CANCER: ICD-10-CM

## 2018-07-10 DIAGNOSIS — R91.8 PULMONARY NODULES: ICD-10-CM

## 2018-07-10 DIAGNOSIS — C79.51 PROSTATE CANCER METASTATIC TO BONE: Primary | ICD-10-CM

## 2018-07-10 DIAGNOSIS — D63.0 ANEMIA IN NEOPLASTIC DISEASE: ICD-10-CM

## 2018-07-10 DIAGNOSIS — C79.51 BONE METASTASES: ICD-10-CM

## 2018-07-10 DIAGNOSIS — T45.1X5A CHEMOTHERAPY-INDUCED THROMBOCYTOPENIA: ICD-10-CM

## 2018-07-10 PROCEDURE — 99214 OFFICE O/P EST MOD 30 MIN: CPT | Mod: S$GLB,,, | Performed by: INTERNAL MEDICINE

## 2018-07-10 PROCEDURE — 96372 THER/PROPH/DIAG INJ SC/IM: CPT | Mod: PN

## 2018-07-10 PROCEDURE — 3078F DIAST BP <80 MM HG: CPT | Mod: CPTII,S$GLB,, | Performed by: INTERNAL MEDICINE

## 2018-07-10 PROCEDURE — 99999 PR PBB SHADOW E&M-EST. PATIENT-LVL III: CPT | Mod: PBBFAC,,, | Performed by: INTERNAL MEDICINE

## 2018-07-10 PROCEDURE — 3074F SYST BP LT 130 MM HG: CPT | Mod: CPTII,S$GLB,, | Performed by: INTERNAL MEDICINE

## 2018-07-10 PROCEDURE — 63600175 PHARM REV CODE 636 W HCPCS: Mod: TB,PN | Performed by: NURSE PRACTITIONER

## 2018-07-10 RX ADMIN — DENOSUMAB 120 MG: 120 INJECTION SUBCUTANEOUS at 11:07

## 2018-07-10 NOTE — PROGRESS NOTES
HISTORY OF PRESENT ILLNESS:  The patient is an 83-year-old white gentleman well   known to me for metastatic prostate carcinoma involving bone.  The patient also   has indeterminate pulmonary nodules.  He is currently managed with quarterly   Xgeva.  Abiraterone and prednisone have been placed on hold due to difficulties   with muscle weakness and disequilibrium.  The patient is having no worsening   bone pain.  He has no urinary complaints at this time.  Disequilibrium and   muscle weakness appears stable.  He has been evaluated and told that he has some   vascular insufficiency involving the central nervous system.  A Neurology   Clinic appointment has been made.  No other pertinent findings on a 14-point   review of system.    PHYSICAL EXAMINATION:  GENERAL:  Well-developed, elderly, thin-appearing, white gentleman in no acute   distress, with a weight of 156.5 pounds (decreased by 2.5 pounds).  VITAL SIGNS:  Documented in EMR and reviewed.  HEENT:  Normocephalic, atraumatic.  Oral mucosa pink and moist.  Lips without   lesions.  Tongue midline.  Oropharynx clear.  Nonicteric sclerae.   NECK:  Supple, no adenopathy.  No carotid bruits, thyromegaly or thyroid nodule.  HEART:  Regular rate and rhythm without murmur, gallop or rub.                LUNGS:  Clear to auscultation bilaterally.  Normal respiratory effort.       ABDOMEN:  Soft, nontender, nondistended with positive normoactive bowel sounds,   no hepatosplenomegaly.    EXTREMITIES:  No cyanosis, clubbing or edema.  Distal pulses are intact.                                              AXILLAE AND GROIN:  No palpable pathologic lymphadenopathy is appreciated.        SKIN:  Intact/turgor normal.  NEUROLOGIC:  Cranial nerves II-XII grossly intact.  Motor:  Good muscle bulk and   tone.  Strength/sensory 5/5 throughout.    LABORATORY:  White count 4.8, H and H 9.8 and 30.0, platelet count of 129.    Chemistry:  Sodium 139, potassium 4.5, chloride 104, CO2 28,  BUN 36, creatinine   1.4, calcium 9.2, mag 2.0.  Liver function tests are within normal limits.  PSA   is relatively stable at 0.14 despite holding therapy.    IMPRESSION:  1.  Metastatic prostate carcinoma involving bone -- clinically stable.  2.  Indeterminate pulmonary nodules.  3.  Mild treatment associated anemia, not in need of therapy.  4.  Mild treatment associated thrombocytopenia, not in need of intervention.    PLAN:  1.  Continue to hold abiraterone and prednisone.  2.  Continue calcium supplementation with vitamin D.  3.  Repeat Xgeva 120 subQ.  4.  Keep Neurology appointment.  5.  Reevaluate in three months from now with interval CBC, CMP, LDH, mag, and   PSA prior to appointment.      FADUMO/HN  dd: 07/10/2018 11:36:42 (CDT)  td: 07/11/2018 04:36:39 (CDT)  Doc ID   #[id number]  Job ID #[id number]    CC:

## 2018-07-24 ENCOUNTER — PROCEDURE VISIT (OUTPATIENT)
Dept: UROLOGY | Facility: CLINIC | Age: 83
End: 2018-07-24
Payer: MEDICARE

## 2018-07-24 VITALS
WEIGHT: 155.19 LBS | HEIGHT: 71 IN | HEART RATE: 76 BPM | SYSTOLIC BLOOD PRESSURE: 100 MMHG | BODY MASS INDEX: 21.73 KG/M2 | DIASTOLIC BLOOD PRESSURE: 55 MMHG

## 2018-07-24 DIAGNOSIS — C61 MALIGNANT NEOPLASM OF PROSTATE: ICD-10-CM

## 2018-07-24 DIAGNOSIS — C67.9 MALIGNANT NEOPLASM OF URINARY BLADDER, UNSPECIFIED SITE: ICD-10-CM

## 2018-07-24 LAB
BILIRUB SERPL-MCNC: NORMAL MG/DL
BLOOD URINE, POC: NORMAL
COLOR, POC UA: YELLOW
GLUCOSE UR QL STRIP: NORMAL
KETONES UR QL STRIP: NORMAL
LEUKOCYTE ESTERASE URINE, POC: NORMAL
NITRITE, POC UA: NORMAL
PH, POC UA: 7
PROTEIN, POC: NORMAL
SPECIFIC GRAVITY, POC UA: 1.01
UROBILINOGEN, POC UA: NORMAL

## 2018-07-24 PROCEDURE — 52000 CYSTOURETHROSCOPY: CPT | Mod: S$GLB,,, | Performed by: UROLOGY

## 2018-07-24 PROCEDURE — 81002 URINALYSIS NONAUTO W/O SCOPE: CPT | Mod: S$GLB,,, | Performed by: UROLOGY

## 2018-07-24 RX ORDER — OXYBUTYNIN CHLORIDE 5 MG/1
5 TABLET ORAL NIGHTLY
Refills: 2 | COMMUNITY
Start: 2018-07-07 | End: 2018-11-15

## 2018-07-24 NOTE — PROGRESS NOTES
UROLOGY [place]  4 24 18     c-c here for cysto     Age 83, comes in today for urologic f/u after his hx of bladder ca.   Has hx of turbt of a 4 cm growth on the R lateral wall of the bladder in august 2017. 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 Pt did well after his turbt. His follow up cystoscopy again showed a recurrence, this time 3 x 3 cm in size in R lateral wall, and also a bladder stone 3 cm in size made up of soft material. The stone and the second tumor were removed in jan 2018. 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 Extensive dystrophic calcification noted within the muscular bladder wall.      In addition to the bladder cancer history, pt has a previous hx of high grade prostate cancer that has spread outside of the gland. Pt has done well with hormonal treatment for his prostate cancer, which he gets intermittently. He initially received 3 years of LHRH, for a total of 6 injections of trelstar 22.5 mg, given every 6 months. After that, he has been getting the hormonal treatment intermittently.      Has nocturia x 1-2, no significant daytime frequency, no urgency or incontinence. No pains or burning. Stream is slightly diminished but no need to strain.      His psa was 12 on presentation. It was 0.14 two weeks ago and 0.09 three months ago.      He has been bothered with constipation and fecal impaction, and has needed to take a number of medication for it, including magnesium citrate and miralax. He is well now from that.      CYSTOSCOPY today 7 24 18: olympus flexible. Anterior urethra normal. Posterior urethra 4 cm, with no bladder outlet obstruction. Bladder cavity distends equally and symmetrically when filled with water. There are no exophytic growths, no suspicious erythematous areas, and no  ulcerations. There are some areas of scarring from previous tumor resections. No bleeding points. There is mild trabeculation and some cellulae formation. There is also a large diverticulum on the R lateral wall, no change from before. Pt tolerated the procedure well.        IMP  Bladder ca, s/p resection x 2  Bladder stone, s/p removal 6 months ago  Chronic constipation, on proper treatment at this time  Prostate ca, in intermittent hormonal ablation.   RTC 6 mo for cysto

## 2018-08-14 ENCOUNTER — OFFICE VISIT (OUTPATIENT)
Dept: NEUROLOGY | Facility: CLINIC | Age: 83
End: 2018-08-14
Payer: MEDICARE

## 2018-08-14 VITALS
BODY MASS INDEX: 22.12 KG/M2 | WEIGHT: 158 LBS | HEIGHT: 71 IN | DIASTOLIC BLOOD PRESSURE: 53 MMHG | SYSTOLIC BLOOD PRESSURE: 94 MMHG | HEART RATE: 76 BPM

## 2018-08-14 DIAGNOSIS — M51.9 LUMBAR DISC DISEASE: ICD-10-CM

## 2018-08-14 DIAGNOSIS — I95.1 ORTHOSTATIC HYPOTENSION: Primary | ICD-10-CM

## 2018-08-14 DIAGNOSIS — Z92.21 HISTORY OF CHEMOTHERAPY: ICD-10-CM

## 2018-08-14 DIAGNOSIS — I70.90 ATHEROSCLEROSIS: ICD-10-CM

## 2018-08-14 DIAGNOSIS — R93.0 WHITE MATTER LOW ATTENUATION ON CT OF BRAIN: ICD-10-CM

## 2018-08-14 PROCEDURE — 99205 OFFICE O/P NEW HI 60 MIN: CPT | Mod: S$GLB,,, | Performed by: PSYCHIATRY & NEUROLOGY

## 2018-08-14 PROCEDURE — 99999 PR PBB SHADOW E&M-EST. PATIENT-LVL V: CPT | Mod: PBBFAC,,, | Performed by: PSYCHIATRY & NEUROLOGY

## 2018-08-14 PROCEDURE — 3074F SYST BP LT 130 MM HG: CPT | Mod: CPTII,S$GLB,, | Performed by: PSYCHIATRY & NEUROLOGY

## 2018-08-14 PROCEDURE — 3078F DIAST BP <80 MM HG: CPT | Mod: CPTII,S$GLB,, | Performed by: PSYCHIATRY & NEUROLOGY

## 2018-08-14 NOTE — PROGRESS NOTES
"NEUROLOGY  Outpatient CONSULT    [facility]  [address], [place] [address]  [phone] (office) / [phone] (fax)    Patient Name:  Cameron Bridges  :  1935  MR #:  [id number]  Acct #:  [id number]    Date of Neurology Consult: 2018  Name of Neurologist: Zee Kaplan D.O, ABPN, AOBNP, ABTIO    Other Physicians:  Dariel Casiano MD (Primary Care Physician); Ashley Nation MD (Referring)      Chief Complaint: Dizziness      History of Present Illness (HPI):  Cameron Bridges is a 83 y.o. male referred by ENT for consultation regarding dizziness.    Patient states he doesn't really know why he is here, he no longer finds the dizziness an issue as he has learned to work around it.  He will pause when he gets up from a chair until he feels he is able to start walking.  The dizziness has been present "a long time". It may have started around the time he began treatment (Xgeva) for prostate and bone cancer.  He states he developed weakness from this treatment, it may or may not have coincided with the dizziness.  He stopped the medication for his cancer and has now been off this for about 4 months.  He still feels weak as a result of that.      His dizzy spells are brief.  They last about 5-6 minutes, usually less.  When he had his orthostatics performed today, he was dizzy with his typical symptoms.  He states that he gets dizzy when he sits for a long time and then gets up.  He does not get dizzy while laying down or while seated.  His dizziness does not occur with head turns, rolling over, or looking up and down.      If he sits back down, the dizziness goes away.      He was sent to see an ENT.  He is not sure what was done there or why they referred him to neurology.  He had a CT brain and was told he needed a neurologist to evaluate this.    He is on Gabapentin, he is not sure what for.  He mentions today that he has narrowing of the disc space in his lower " spine.  He has some pain that radiates down the back of his left leg.  He is following with orthopedics for this, but that is not who put him on Gabapentin.  He states the orthopedist had told him to mention his leg pain to neurology.     He is on metoprolol, Imdur, lisinopril, oxybutinin, terazosin, and tamsulosin per his medication list.  He states he is on a lot of medications and would like to minimize this list.      He does not take a baby aspirin everyday.  He does take his clopidogrel daily.  He has a history of a carotid artery stent in addition to the coronary arteries.  He also has had vascular surgery in the legs.      Treatment to date:    Antivert - no benefit    Review of Systems:   General: Weight gain: No, Weight Loss: Yes, Fatigue: Yes,   Fever: No, Chills: No, Night Sweats: No, Insomnia: No, Excessive sleeping: No   Respiratory:  Cough: Yes, Shortness of Breath: No,   Wheezing: No, Excessive Snoring: No, Coughing up blood: No  Endocrine: Heat Intolerance: No, Cold Intolerance: Yes,   Excessive Thirst: No, Excessive Hunger: No,   Eyes:  Blurred Vision: No, Double Vision: Yes,   Light Sensitivity: No, Eye pain: No  Musculoskeletal: Muscle Aches/Pain: Yes, Joint Pain/Swelling: No, Muscle Cramps: Yes, Muscle Weakness: Yes, Neck Pain: No, Back Pain: No   Neurological: Difficulty Walking/Falls: Yes, Headache Migraine: No, Dizziness/Vertigo: Yes, Fainting: No, Difficulty with Speech: No, Weakness: Yes, Tingling/Numbness: No, Tremors: No, Memory Problems: No, Seizures: No, Difficulty Swallowing: No, Altered Taste: No.  Cardiovascular: Chest Pain: No, Shortness of Breath: Yes,   Palpitations: No,  Gastrointestinal: Nausea/Vomiting: No, Constipation: No, Diarrhea: No, Bloody Stools: No   Psych/Cog:  Depression: No, Anxiety: No, Hallucinations: No, Problems Concentrating: No  : Frequent Urination: Yes, Incontinence: No, Blood of Urine: No, Urinary Infections: No, Changes in Sex Drive: No   ENT:Hearing  Loss: Yes, Earache: No, Ringing in Ears: No,   Facial Pain: No, Chronic Congestion: No   Immune: Seasonal Allergies: No, Hives and/or Rashes: No  The remainder of the review of twelve body systems was reviewed and normal.    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    d/t cancer meds    Hearing loss in left ear     Hyperlipidemia     Hypertension     Hypertrophy (benign) of prostate     Light smoker     Peripheral vascular disease     pain at night, mild claudication, but no Hx clots    Peripheral vascular disease with claudication 3/31/2017    Prostate cancer     Prostate & bone cancer    Rectal prolapse 2012    sometimes bleeds    Rotator cuff tear     bilateral    Ulcer     in  past     Past Surgical History:   Procedure Laterality Date    APPENDECTOMY      CARDIAC SURGERY      CAROTID STENT      right    CHOLECYSTECTOMY      CORONARY ARTERY BYPASS GRAFT  2001    X 4    CORONARY STENT PLACEMENT  last 2009    total 4    CYSTOSCOPY      FEMORAL ARTERY STENT Left     FEMORAL BYPASS Right 2017    HERNIA REPAIR      umbillical    VASCULAR SURGERY  2010    carotid endarterectomy,left 2010    VASECTOMY       Family History   Adopted: Yes   Problem Relation Age of Onset    Stroke Father     Heart disease Mother     Hypertension Mother      Alcohol use:  reports that he drinks about 3.0 oz of alcohol per week.   (Of note, 0.6 oz = 1 beer or 6  oz = 10 beers).  Tobacco use:  reports that he has been smoking cigarettes.  He has a 30.00 pack-year smoking history. His smokeless tobacco use includes chew.  Street drug use:  reports that he does not use drugs.  Allergies: No known drug allergies.    Home Medications:     Current Outpatient Medications:     AMINO AC/WHEY PROT CONC, ISOL (PROCEL WHEY PROTEIN ORAL), Take 1 Dose by mouth once daily. I  Scoop with a day with water., Disp: , Rfl:     aspirin (ECOTRIN) 81 MG EC tablet, Take 1 tablet by mouth Every 3 (three) days. , Disp: , Rfl:     atorvastatin (LIPITOR) 40 MG tablet, Take 1 tablet by mouth every evening. , Disp: , Rfl:     bismuth subsalicylate (PEPTO BISMOL) 262 mg/15 mL suspension, Take 15 mLs by mouth every 6 (six) hours as needed for Indigestion., Disp: , Rfl:     calcium carbonate-vit D3-min (CALCIUM-VITAMIN D) 600 mg calcium- 400 unit Tab, Take 1 tablet by mouth once daily. , Disp: , Rfl:     clopidogrel (PLAVIX) 75 mg tablet, Take 75 mg by mouth once daily. , Disp: , Rfl:     fenofibrate (TRIGLIDE) 160 MG tablet, Take 160 mg by mouth once daily.  , Disp: , Rfl:     gabapentin (NEURONTIN) 300 MG capsule, Take 1 capsule (300 mg total) by mouth 2 (two) times daily., Disp: 60 capsule, Rfl: 11    glimepiride (AMARYL) 2 MG tablet, Take 2 mg by mouth daily with breakfast. Per pt 1/2 to whole tablet a day, Disp: , Rfl:     HYDROcodone-acetaminophen (NORCO) 7.5-325 mg per tablet, Take 1 tablet by mouth every 12 (twelve) hours as needed for Pain., Disp: 50 tablet, Rfl: 0    isosorbide mononitrate (IMDUR) 60 MG 24 hr tablet, Take 60 mg by mouth once daily., Disp: , Rfl:     lisinopril (PRINIVIL,ZESTRIL) 2.5 MG tablet, Take 2.5 mg by mouth once daily.  , Disp: , Rfl:     meclizine (ANTIVERT) 25 mg tablet, Take 1 tablet (25 mg total) by mouth 3 (three) times daily as needed., Disp: 100 tablet, Rfl: 3    metoprolol (TOPROL XL) 50 MG 24 hr tablet, Take 50 mg by mouth once daily. , Disp: , Rfl:  "    Multi-Vitamin tablet, Take 1 tablet by mouth once daily. , Disp: , Rfl:     nitroGLYCERIN (NITROSTAT) 0.4 MG SL tablet, Place 1 tablet (0.4 mg total) under the tongue every 5 (five) minutes as needed for Chest pain., Disp: 30 tablet, Rfl: 12    omeprazole (PRILOSEC) 20 MG capsule, Take 20 mg by mouth once daily., Disp: , Rfl: 3    ONE TOUCH ULTRA TEST Strp, , Disp: , Rfl:     oxybutynin (DITROPAN) 5 MG Tab, Take 5 mg by mouth every evening., Disp: , Rfl: 2    phenazopyridine (PYRIDIUM) 200 MG tablet, Take 200 mg by mouth 3 (three) times daily as needed for Pain., Disp: , Rfl:     ropinirole (REQUIP) 4 MG tablet, Take 4-8 mg by mouth nightly. , Disp: , Rfl: 2    saw palmetto 160 MG capsule, Take 160 mg by mouth 2 (two) times daily., Disp: , Rfl:     tamsulosin (FLOMAX) 0.4 mg Cp24, Take 1 capsule by mouth once daily., Disp: , Rfl: 3    terazosin (HYTRIN) 1 MG capsule, Take 1 capsule by mouth Twice daily. , Disp: , Rfl:     Current Facility-Administered Medications:     sodium chloride 0.9% flush 10 mL, 10 mL, Intravenous, PRN, Paper Order, 10 mL at 04/26/17 0930    Physical Examination:  BP (!) 94/53 (BP Location: Left arm, Patient Position: Standing, BP Method: Medium (Automatic))   Pulse 76   Ht 5' 11" (1.803 m)   Wt 71.7 kg (158 lb)   BMI 22.04 kg/m²     Vitals:    08/14/18 0909 08/14/18 0914 08/14/18 0916 08/14/18 0918   BP: (!) 95/53 129/60 118/73 (!) 94/53   BP Location: Left arm Left arm Left arm Left arm   Patient Position: Sitting Lying Sitting Standing   BP Method: Medium (Automatic) Medium (Automatic) Medium (Automatic) Medium (Automatic)   Pulse: 74 69 73 76   Weight: 71.7 kg (158 lb)      Height: 5' 11" (1.803 m)            GENERAL:  General appearance: Well, non-toxic appearing.  No apparent distress.  Fundi exam: normal.  Neck: supple.  Carotid auscultation: normal.  Heart auscultation: normal.  Peripheral pulses: normal.  Extremities: extremely limited ROM in the left shoulder more " than the right shoulder, no extremity edema.    MENTAL STATUS:  Alertness, attention span & concentration: normal.  Language: normal.  Orientation to self, place & time:  normal.  Memory, recent & remote: normal.  Fund of knowledge: normal.    SPEECH:  Clear and fluent.  Follows complex commands.    CRANIAL NERVES:  Cranial Nerves II-XII were examined.  II - Visual fields: normal.  III, IV, VI: PERRL, EOMI, No ptosis, No nystagmus.  V - Facial sensation: normal.  VII - Face symmetry & mobility: normal.  VIII - Hearing: normal.  IX, X - Palate: mobile & midline.  XI - Shoulder shrug: normal.  XII - Tongue protrusion: normal.    GROSS MOTOR:  Gait & station: uses a cane, but overall normal aside from being slightly antalgic.  Tone: normal.  Abnormal movements: none.  Finger-nose & Heel-knee-shin: normal.  Rapid alternating movements & drift: normal.  Romberg: absent    MUSCLE STRENGTH:     Fascics Atrophy RIGHT    LEFT Atrophy Fascics     5 Neck Ext. 5       5 Neck Flex 5       Limited ROM Deltoids Limited ROM       Limited ROM Sh.Ext.Rot. Limited ROM       Limited ROM Sh.Int.Rot. Limited ROM       5 Biceps 5       5 Triceps 5       5 Forearm.Pr. 5       5 Wrist Ext. 5       5 Wrist Flex 5       5 Finger Ext. 5       5 Finger Flex 5       5 FPL 5       5 Inteross. 5                         5 Iliopsoas 5       5 Hip Abduct 5       5 Hip Adduct 5       5 Quads 5       5 Hams 5       5 Dorsiflex 5       5 Plantar Flex 5       5 Ankle Jose 5       5 Ankle Invert 5       5 Toe Ext. 5       5 Toe Flex 5                         REFLEXES:    RIGHT Reflex   LEFT   2 Biceps 2   2 Brachiorad. 2   2 Triceps 2        2 Patellar 2   tr Ankle tr        Down PLANTAR Down     SENSORY:  Light touch: Normal throughout.  Sharp touch: Normal throughout.  Vibration: lost in distal feet (normal with age).    Diagnostic Data Reviewed:   Records were reviewed, however there were no records from ENT or his primary physician.  He has been seen  in orthopedics and a bone scan was recommended.  He follows with hematology for his history of cancer.  Abiraterone and prednisone have been placed on hold due to difficulties with muscle weakness and disequilibrium per their records.  He is on quarterly Xgeva.    Imaging is that his CT of the head performed with and without contrast on 07/05/2018 were reviewed.  There is mild small vessel disease present but no significant posterior fossa changes.  There is mild atrophy present as well.  There is no enlargement of the ventricles and the atrophy is consistent with his age.    Assessment and Plan:  Cameron Bridges is a 83 y.o., right handed man with a history of metastatic prostate cancer treated with chemotherapy, anemia, and diffuse large vessel atherosclerotic disease who suffers from positional dizziness occurring when rising to a standing position.  On examination today we were able to reproduce these symptoms while checking his orthostatic vital signs.  He is markedly orthostatic and due to the presence of beta blockers is unable to create compensatory tachycardia.  His dizziness is due to orthostatic hypotension and bradycardia.  The management requires adjustment of his medications.  His cardiologist, PCP, and urologist will be notified of this issue.    His dizziness is not secondary to changes on his CT or vascular disease.  He has normal aging changes in the brain.  There are no concerning findings.  Microvascular disease is expected in this age group, but prevention is reasonable given his diffuse atherosclerotic history.  He is on preventive therapy with clopidogrel daily.  From a neurology perspective, he would only need to be on ASA or clopidogrel.  Given his history of large vessel disease, clopidogrel is likely the better choice.  He should also maintain a blood pressure less than 130/80 and LDL < 100.  He does not have diabetes, but certainly monitoring to keep his A1c < 6.3 is also important.  No  other testing is needed at this time.    His records from ENT and his primary physician will be requested to be sure nothing is missed in regards to their referral.      He mentions problems with his back and left leg, no information provided on this today. He rerports they found an issue with his back.  If he needs a neurology consult for this, his doctors will need to submit that infrmation, otherwise it sounds like he may be appropriate for the [facility] in [place].  This is staffed by PMR, Pain Management and Neurosurgery.  Dr. Pelayo is a neurosurgeon in that clinic who performs minimally invasive surgeries if needed.  This would be a good screening clinic to find the best management approach for his condition. Of note, his strength, sensation and reflexes were symmetric on exam today.    No need for follow up in neurology clinic at this time.    Important to note, also  has a past medical history of Anemia (3/26/2013), Anticoagulant long-term use, Arthritis, Cerebrovascular disease, Colon polyp, Complication of anesthesia, COPD (chronic obstructive pulmonary disease), Coronary artery disease, Diabetes mellitus, Elevated PSA, Encounter for blood transfusion, Equilibrium disorder (02/2017), Hearing loss in left ear, Hyperlipidemia, Hypertension, Hypertrophy (benign) of prostate, Light smoker, Peripheral vascular disease, Peripheral vascular disease with claudication (3/31/2017), Prostate cancer, Rectal prolapse (2012), Rotator cuff tear, and Ulcer.        Zee Kaplan D.O, ABPN, AOBNP, ABEM    This note was created with voice recognition software.  Grammatical, syntax and spelling errors may be inevitable.

## 2018-08-14 NOTE — LETTER
August 14, 2018      Ashley Nation MD  [address] [place] [address] [address] [place] [id number]           [facility]  [address] [place] [address]  Phone: [phone]  Fax: [phone]          Patient: Cameron Bridges   MR Number: [id number]   YOB: 1935   Date of Visit: 8/14/2018       Dear Dr. Ashley Nation:    Thank you for referring Cameron Bridges to me for evaluation. Attached you will find relevant portions of my assessment and plan of care.    If you have questions, please do not hesitate to call me. I look forward to following Cameron Bridges along with you.    Sincerely,    Zee Kaplan, DO    Enclosure  CC:  No Recipients    If you would like to receive this communication electronically, please contact [email or web address] or [phone] to request more information on [facility] Link access.    For providers and/or their staff who would like to refer a patient to [facility], please contact us through our one-stop-shop provider referral line, [facility] , at [phone].    If you feel you have received this communication in error or would no longer like to receive these types of communications, please e-mail [email or web address]

## 2018-08-14 NOTE — PATIENT INSTRUCTIONS
Be sure to count to 10 in your head before walking when you stand up.    You are dizzy because your blood pressure and heart rate are too low when you stand up.  This is called orthostatic hypotension.  Adjusting your medications will help this.  Your doctors will be notified.    Your back issues may be best addressed in the [facility] Sp[facility] clinic in [place].  Dr. Pelayo is a neurosurgeon who does minimally invasive spine surgery.

## 2018-08-28 ENCOUNTER — OFFICE VISIT (OUTPATIENT)
Dept: PAIN MEDICINE | Facility: CLINIC | Age: 83
End: 2018-08-28
Payer: MEDICARE

## 2018-08-28 VITALS
HEIGHT: 71 IN | WEIGHT: 152 LBS | BODY MASS INDEX: 21.28 KG/M2 | HEART RATE: 57 BPM | SYSTOLIC BLOOD PRESSURE: 125 MMHG | DIASTOLIC BLOOD PRESSURE: 65 MMHG

## 2018-08-28 DIAGNOSIS — M51.36 DDD (DEGENERATIVE DISC DISEASE), LUMBAR: Primary | ICD-10-CM

## 2018-08-28 DIAGNOSIS — C61 PROSTATE CANCER: ICD-10-CM

## 2018-08-28 DIAGNOSIS — M54.16 LUMBAR RADICULOPATHY: Primary | ICD-10-CM

## 2018-08-28 DIAGNOSIS — C79.51 BONE METASTASIS: ICD-10-CM

## 2018-08-28 DIAGNOSIS — M54.16 LUMBAR RADICULITIS: ICD-10-CM

## 2018-08-28 PROCEDURE — 99999 PR PBB SHADOW E&M-EST. PATIENT-LVL III: CPT | Mod: PBBFAC,,, | Performed by: ANESTHESIOLOGY

## 2018-08-28 PROCEDURE — 99204 OFFICE O/P NEW MOD 45 MIN: CPT | Mod: S$GLB,,, | Performed by: ANESTHESIOLOGY

## 2018-08-28 PROCEDURE — 3078F DIAST BP <80 MM HG: CPT | Mod: CPTII,S$GLB,, | Performed by: ANESTHESIOLOGY

## 2018-08-28 PROCEDURE — 3074F SYST BP LT 130 MM HG: CPT | Mod: CPTII,S$GLB,, | Performed by: ANESTHESIOLOGY

## 2018-08-28 NOTE — H&P (VIEW-ONLY)
This note was completed with dictation software and grammatical errors may exist.    Referring Physician: Zee Kaplan,*    PCP: Dariel Casiano MD      CC:  Left leg pain    HPI:   Cameron Bridges is a 83 y.o. male referred to us for left leg pain. Pain has been present over 6 months.  No recent traumatic incident.  He has significant history of prostate cancer with bony mets to his hips, knees.  Pain is a constant aching, throbbing pain in his lower back.  Pain radiates down his left leg into his left ankle.  Pain worsens with walking, getting up in the morning.  Pain improves with rest.  He recently had x-rays of his lumbar spine which showed degenerative changes at L4-5 and L5-S1.  He has tried physical therapy with minimal benefit.  He has not had any interventional procedures.  He denies any worsening weakness.  No bowel bladder changes.    ROS:  CONSTITUTIONAL: No fevers, chills, night sweats, wt. loss, appetite changes  SKIN: no rashes or itching  ENT: No headaches, head trauma, vision changes, or eye pain  LYMPH NODES: None noticed   CV: No chest pain, palpitations.   RESP: No shortness of breath, dyspnea on exertion, cough, wheezing, or hemoptysis  GI: No nausea, emesis, diarrhea, constipation, melena, hematochezia, pain.    : No dysuria, hematuria, urgency, or frequency   HEME: No easy bruising, bleeding problems  PSYCHIATRIC: No depression, anxiety, psychosis, hallucinations.  NEURO: No seizures, memory loss, dizziness or difficulty sleeping  MSK:  Positive HPI      Past Medical History:   Diagnosis Date    Anemia 3/26/2013    Anticoagulant long-term use     Arthritis     osteo    Cerebrovascular disease     Colon polyp     Complication of anesthesia     post-op confusion    COPD (chronic obstructive pulmonary disease)     has inhalers, does not use, no oxygen    Coronary artery disease     previous ejection fraction 32%,last echo 2012 now  65%; stents & CABG    Diabetes mellitus      type II, controlled, per spouse    Elevated PSA     Encounter for blood transfusion     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/2017    d/t cancer meds    Hearing loss in left ear     Hyperlipidemia     Hypertension     Hypertrophy (benign) of prostate     Light smoker     Peripheral vascular disease     pain at night, mild claudication, but no Hx clots    Peripheral vascular disease with claudication 3/31/2017    Prostate cancer     Prostate & bone cancer    Rectal prolapse 2012    sometimes bleeds    Rotator cuff tear     bilateral    Ulcer     in  past     Past Surgical History:   Procedure Laterality Date    APPENDECTOMY      CARDIAC SURGERY      CAROTID STENT      right    CHOLECYSTECTOMY      CORONARY ARTERY BYPASS GRAFT  2001    X 4    CORONARY STENT PLACEMENT  last 2009    total 4    CYSTOSCOPY      FEMORAL ARTERY STENT Left     FEMORAL BYPASS Right 2017    HERNIA REPAIR      umbillical    VASCULAR SURGERY  2010    carotid endarterectomy,left 2010    VASECTOMY       Family History   Adopted: Yes   Problem Relation Age of Onset    Stroke Father     Heart disease Mother     Hypertension Mother      Social History     Socioeconomic History    Marital status:      Spouse name: None    Number of children: None    Years of education: None    Highest education level: None   Social Needs    Financial resource strain: None    Food insecurity - worry: None    Food insecurity - inability: None    Transportation needs - medical: None    Transportation needs - non-medical: None   Occupational History    Occupation: [employer]   Tobacco Use    Smoking status: Current Every Day Smoker     Packs/day: 0.50     Years: 60.00     Pack years: 30.00     Types: Cigarettes    Smokeless tobacco: Current User     Types: Chew   Substance and Sexual Activity    Alcohol use: Yes     Alcohol/week: 3.0 oz     Types: 5 Shots of liquor per week     Comment: 1 shot of bourbon or a beer a day    Drug use:  "No    Sexual activity: None   Other Topics Concern    None   Social History Narrative    None         Medications/Allergies: See med card    Vitals:    08/28/18 0828   BP: 125/65   Pulse: (!) 57   Weight: 68.9 kg (152 lb)   Height: 5' 11" (1.803 m)   PainSc:   5   PainLoc: Back         Physical exam:    GENERAL: A and O x3, the patient appears well groomed and is in no acute distress.  Skin: No rashes or obvious lesions  HEENT: normocephalic, atraumatic  CARDIOVASCULAR:  Palpable peripheral pulses  LUNGS: easy work of breathing  ABDOMEN: soft, nontender   UPPER EXTREMITIES: Normal alignment, normal range of motion, no atrophy, no skin changes,  hair growth and nail growth normal and equal bilaterally. No swelling, no tenderness.    LOWER EXTREMITIES:  Normal alignment, normal range of motion, no atrophy, no skin changes,  hair growth and nail growth normal and equal bilaterally. No swelling, no tenderness.    LUMBAR SPINE  Lumbar spine: ROM is mildly limited with flexion extension and oblique extension with moderate increased pain.    Neo's test causes no increased pain on either side.    Supine straight leg raise is negative bilaterally.    Internal and external rotation of the hip causes no increased pain on either side.  Myofascial exam: No tenderness to palpation across lumbar paraspinous muscles.      MENTAL STATUS: normal orientation, speech, language, and fund of knowledge for social situation.  Emotional state appropriate.    CRANIAL NERVES:  II:  PERRL bilaterally,   III,IV,VI: EOMI.    V:  Facial sensation equal bilaterally  VII:  Facial motor function normal.  VIII:  Hearing equal to finger rub bilaterally  IX/X: Gag normal, palate symmetric  XI:  Shoulder shrug equal, head turn equal  XII:  Tongue midline without fasciculations      MOTOR: Tone and bulk: normal bilateral upper and lower Strength: normal "   Delt Bi Tri WE WF     R 5 5 5 5 5 5   L 5 5 5 5 5 5     IP ADD ABD Quad TA Gas HAM  R 5 5 5 5 5 5 5  L 5 5 5 5 5 5 5    SENSATION: Light touch and pinprick intact bilaterally  REFLEXES: normal, symmetric, nonbrisk.  Toes down, no clonus. No hoffmans.  GAIT: normal rise, base, steps, and arm swing.        Imaging:  X-ray lumbar spine 08/09/2018   1. Extensive sclerotic metastatic disease secondary to patient's known prostate cancer.  No acute osseous finding.    2.  Multilevel degenerative changes most significant at L4-L5 and L5-S1 as above.    Assessment:  Patient referred for low back and left leg pain  1. DDD (degenerative disc disease), lumbar    2. Lumbar radiculitis    3. Prostate cancer    4. Bone metastasis          Plan:  1. I have stressed the importance of physical activity and exercise to improve overall health  2. Reviewed imaging with patient  3. I think that the patient's back pain and radicular leg symptoms are due to degenerative disc disease and have recommended a lumbar epidural steroid injection to the Left L4-5 and L5-S1 level(s).  4. May consider lumbar MRI if above is not helpful  5. Follow up after procedure      Thank you for referring this interesting patient, and I look forward to continuing to collaborate in his care.

## 2018-08-28 NOTE — PROGRESS NOTES
This note was completed with dictation software and grammatical errors may exist.    Referring Physician: Zee Kaplan,*    PCP: Dariel Casiano MD      CC:  Left leg pain    HPI:   Cameron Bridges is a 83 y.o. male referred to us for left leg pain. Pain has been present over 6 months.  No recent traumatic incident.  He has significant history of prostate cancer with bony mets to his hips, knees.  Pain is a constant aching, throbbing pain in his lower back.  Pain radiates down his left leg into his left ankle.  Pain worsens with walking, getting up in the morning.  Pain improves with rest.  He recently had x-rays of his lumbar spine which showed degenerative changes at L4-5 and L5-S1.  He has tried physical therapy with minimal benefit.  He has not had any interventional procedures.  He denies any worsening weakness.  No bowel bladder changes.    ROS:  CONSTITUTIONAL: No fevers, chills, night sweats, wt. loss, appetite changes  SKIN: no rashes or itching  ENT: No headaches, head trauma, vision changes, or eye pain  LYMPH NODES: None noticed   CV: No chest pain, palpitations.   RESP: No shortness of breath, dyspnea on exertion, cough, wheezing, or hemoptysis  GI: No nausea, emesis, diarrhea, constipation, melena, hematochezia, pain.    : No dysuria, hematuria, urgency, or frequency   HEME: No easy bruising, bleeding problems  PSYCHIATRIC: No depression, anxiety, psychosis, hallucinations.  NEURO: No seizures, memory loss, dizziness or difficulty sleeping  MSK:  Positive HPI      Past Medical History:   Diagnosis Date    Anemia 3/26/2013    Anticoagulant long-term use     Arthritis     osteo    Cerebrovascular disease     Colon polyp     Complication of anesthesia     post-op confusion    COPD (chronic obstructive pulmonary disease)     has inhalers, does not use, no oxygen    Coronary artery disease     previous ejection fraction 32%,last echo 2012 now  65%; stents & CABG    Diabetes mellitus      type II, controlled, per spouse    Elevated PSA     Encounter for blood transfusion     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/2017    d/t cancer meds    Hearing loss in left ear     Hyperlipidemia     Hypertension     Hypertrophy (benign) of prostate     Light smoker     Peripheral vascular disease     pain at night, mild claudication, but no Hx clots    Peripheral vascular disease with claudication 3/31/2017    Prostate cancer     Prostate & bone cancer    Rectal prolapse 2012    sometimes bleeds    Rotator cuff tear     bilateral    Ulcer     in  past     Past Surgical History:   Procedure Laterality Date    APPENDECTOMY      CARDIAC SURGERY      CAROTID STENT      right    CHOLECYSTECTOMY      CORONARY ARTERY BYPASS GRAFT  2001    X 4    CORONARY STENT PLACEMENT  last 2009    total 4    CYSTOSCOPY      FEMORAL ARTERY STENT Left     FEMORAL BYPASS Right 2017    HERNIA REPAIR      umbillical    VASCULAR SURGERY  2010    carotid endarterectomy,left 2010    VASECTOMY       Family History   Adopted: Yes   Problem Relation Age of Onset    Stroke Father     Heart disease Mother     Hypertension Mother      Social History     Socioeconomic History    Marital status:      Spouse name: None    Number of children: None    Years of education: None    Highest education level: None   Social Needs    Financial resource strain: None    Food insecurity - worry: None    Food insecurity - inability: None    Transportation needs - medical: None    Transportation needs - non-medical: None   Occupational History    Occupation: [employer]   Tobacco Use    Smoking status: Current Every Day Smoker     Packs/day: 0.50     Years: 60.00     Pack years: 30.00     Types: Cigarettes    Smokeless tobacco: Current User     Types: Chew   Substance and Sexual Activity    Alcohol use: Yes     Alcohol/week: 3.0 oz     Types: 5 Shots of liquor per week     Comment: 1 shot of bourbon or a beer a day    Drug use:  "No    Sexual activity: None   Other Topics Concern    None   Social History Narrative    None         Medications/Allergies: See med card    Vitals:    08/28/18 0828   BP: 125/65   Pulse: (!) 57   Weight: 68.9 kg (152 lb)   Height: 5' 11" (1.803 m)   PainSc:   5   PainLoc: Back         Physical exam:    GENERAL: A and O x3, the patient appears well groomed and is in no acute distress.  Skin: No rashes or obvious lesions  HEENT: normocephalic, atraumatic  CARDIOVASCULAR:  Palpable peripheral pulses  LUNGS: easy work of breathing  ABDOMEN: soft, nontender   UPPER EXTREMITIES: Normal alignment, normal range of motion, no atrophy, no skin changes,  hair growth and nail growth normal and equal bilaterally. No swelling, no tenderness.    LOWER EXTREMITIES:  Normal alignment, normal range of motion, no atrophy, no skin changes,  hair growth and nail growth normal and equal bilaterally. No swelling, no tenderness.    LUMBAR SPINE  Lumbar spine: ROM is mildly limited with flexion extension and oblique extension with moderate increased pain.    Neo's test causes no increased pain on either side.    Supine straight leg raise is negative bilaterally.    Internal and external rotation of the hip causes no increased pain on either side.  Myofascial exam: No tenderness to palpation across lumbar paraspinous muscles.      MENTAL STATUS: normal orientation, speech, language, and fund of knowledge for social situation.  Emotional state appropriate.    CRANIAL NERVES:  II:  PERRL bilaterally,   III,IV,VI: EOMI.    V:  Facial sensation equal bilaterally  VII:  Facial motor function normal.  VIII:  Hearing equal to finger rub bilaterally  IX/X: Gag normal, palate symmetric  XI:  Shoulder shrug equal, head turn equal  XII:  Tongue midline without fasciculations      MOTOR: Tone and bulk: normal bilateral upper and lower Strength: normal "   Delt Bi Tri WE WF     R 5 5 5 5 5 5   L 5 5 5 5 5 5     IP ADD ABD Quad TA Gas HAM  R 5 5 5 5 5 5 5  L 5 5 5 5 5 5 5    SENSATION: Light touch and pinprick intact bilaterally  REFLEXES: normal, symmetric, nonbrisk.  Toes down, no clonus. No hoffmans.  GAIT: normal rise, base, steps, and arm swing.        Imaging:  X-ray lumbar spine 08/09/2018   1. Extensive sclerotic metastatic disease secondary to patient's known prostate cancer.  No acute osseous finding.    2.  Multilevel degenerative changes most significant at L4-L5 and L5-S1 as above.    Assessment:  Patient referred for low back and left leg pain  1. DDD (degenerative disc disease), lumbar    2. Lumbar radiculitis    3. Prostate cancer    4. Bone metastasis          Plan:  1. I have stressed the importance of physical activity and exercise to improve overall health  2. Reviewed imaging with patient  3. I think that the patient's back pain and radicular leg symptoms are due to degenerative disc disease and have recommended a lumbar epidural steroid injection to the Left L4-5 and L5-S1 level(s).  4. May consider lumbar MRI if above is not helpful  5. Follow up after procedure      Thank you for referring this interesting patient, and I look forward to continuing to collaborate in his care.

## 2018-08-30 ENCOUNTER — TELEPHONE (OUTPATIENT)
Dept: HEMATOLOGY/ONCOLOGY | Facility: CLINIC | Age: 83
End: 2018-08-30

## 2018-08-30 NOTE — TELEPHONE ENCOUNTER
Scheduled appt 9/18 @ 11:40 with labs    ----- Message from Mackenzie Reed sent at 8/29/2018  3:44 PM CDT -----  Contact: [place]/wife [phone]  States that she is calling to schedule appt for pt for follow up appt. States that pt feels like he needs to be seen before his scheduled appt. Please call back at [phone]//thank you acc

## 2018-09-11 ENCOUNTER — HOSPITAL ENCOUNTER (OUTPATIENT)
Facility: AMBULARY SURGERY CENTER | Age: 83
Discharge: HOME | End: 2018-09-11
Attending: ANESTHESIOLOGY | Admitting: ANESTHESIOLOGY
Payer: MEDICARE

## 2018-09-11 DIAGNOSIS — M54.16 LUMBAR RADICULITIS: Primary | ICD-10-CM

## 2018-09-11 LAB — POCT GLUCOSE: 100 MG/DL (ref 70–110)

## 2018-09-11 PROCEDURE — 64483 NJX AA&/STRD TFRM EPI L/S 1: CPT | Mod: LT,,, | Performed by: ANESTHESIOLOGY

## 2018-09-11 PROCEDURE — 64484 NJX AA&/STRD TFRM EPI L/S EA: CPT | Performed by: ANESTHESIOLOGY

## 2018-09-11 PROCEDURE — 64483 NJX AA&/STRD TFRM EPI L/S 1: CPT | Performed by: ANESTHESIOLOGY

## 2018-09-11 PROCEDURE — 64484 NJX AA&/STRD TFRM EPI L/S EA: CPT | Mod: LT,,, | Performed by: ANESTHESIOLOGY

## 2018-09-11 PROCEDURE — 99152 MOD SED SAME PHYS/QHP 5/>YRS: CPT | Mod: ,,, | Performed by: ANESTHESIOLOGY

## 2018-09-11 RX ORDER — MIDAZOLAM HYDROCHLORIDE 1 MG/ML
INJECTION INTRAMUSCULAR; INTRAVENOUS
Status: DISCONTINUED | OUTPATIENT
Start: 2018-09-11 | End: 2018-09-11 | Stop reason: HOSPADM

## 2018-09-11 RX ORDER — DEXAMETHASONE SODIUM PHOSPHATE 10 MG/ML
INJECTION INTRAMUSCULAR; INTRAVENOUS
Status: DISCONTINUED | OUTPATIENT
Start: 2018-09-11 | End: 2018-09-11 | Stop reason: HOSPADM

## 2018-09-11 RX ORDER — SODIUM CHLORIDE, SODIUM LACTATE, POTASSIUM CHLORIDE, CALCIUM CHLORIDE 600; 310; 30; 20 MG/100ML; MG/100ML; MG/100ML; MG/100ML
INJECTION, SOLUTION INTRAVENOUS ONCE AS NEEDED
Status: COMPLETED | OUTPATIENT
Start: 2018-09-11 | End: 2018-09-11

## 2018-09-11 RX ORDER — FENTANYL CITRATE 50 UG/ML
INJECTION, SOLUTION INTRAMUSCULAR; INTRAVENOUS
Status: DISCONTINUED | OUTPATIENT
Start: 2018-09-11 | End: 2018-09-11 | Stop reason: HOSPADM

## 2018-09-11 RX ORDER — LIDOCAINE HYDROCHLORIDE 10 MG/ML
INJECTION, SOLUTION EPIDURAL; INFILTRATION; INTRACAUDAL; PERINEURAL
Status: DISCONTINUED | OUTPATIENT
Start: 2018-09-11 | End: 2018-09-11 | Stop reason: HOSPADM

## 2018-09-11 RX ORDER — FENTANYL CITRATE 50 UG/ML
INJECTION, SOLUTION INTRAMUSCULAR; INTRAVENOUS
Status: DISCONTINUED
Start: 2018-09-11 | End: 2018-09-11 | Stop reason: HOSPADM

## 2018-09-11 RX ORDER — MIDAZOLAM HYDROCHLORIDE 1 MG/ML
INJECTION INTRAMUSCULAR; INTRAVENOUS
Status: DISCONTINUED
Start: 2018-09-11 | End: 2018-09-11 | Stop reason: HOSPADM

## 2018-09-11 RX ORDER — DEXAMETHASONE SODIUM PHOSPHATE 10 MG/ML
INJECTION INTRAMUSCULAR; INTRAVENOUS
Status: DISCONTINUED
Start: 2018-09-11 | End: 2018-09-11 | Stop reason: HOSPADM

## 2018-09-11 RX ADMIN — SODIUM CHLORIDE, SODIUM LACTATE, POTASSIUM CHLORIDE, CALCIUM CHLORIDE: 600; 310; 30; 20 INJECTION, SOLUTION INTRAVENOUS at 11:09

## 2018-09-11 NOTE — DISCHARGE SUMMARY
[facility]  Discharge Note  Short Stay    Admit Date: 9/11/2018    Discharge Date and Time: 9/11/2018    Attending Physician: Dexter Rdz MD     Discharge Provider: Dexter Rdz    Diagnoses:  Active Hospital Problems    Diagnosis  POA    *Lumbar radiculitis [M54.16]  Yes      Resolved Hospital Problems   No resolved problems to display.       Hospital Course: Lumbar [facility]  Discharged Condition: Good    Final Diagnoses:   Active Hospital Problems    Diagnosis  POA    *Lumbar radiculitis [M54.16]  Yes      Resolved Hospital Problems   No resolved problems to display.       Disposition: Home or Self Care    Follow up/Patient Instructions:    Medications:  Reconciled Home Medications:      Medication List      CONTINUE taking these medications    aspirin 81 MG EC tablet  Commonly known as:  ECOTRIN  Take 1 tablet by mouth Every 3 (three) days.     bismuth subsalicylate 262 mg/15 mL suspension  Commonly known as:  PEPTO BISMOL  Take 15 mLs by mouth every 6 (six) hours as needed for Indigestion.     CALCIUM-VITAMIN D 600 mg calcium- 400 unit Tab  Generic drug:  calcium carbonate-vit D3-min  Take 1 tablet by mouth once daily.     clopidogrel 75 mg tablet  Commonly known as:  PLAVIX  Take 75 mg by mouth once daily.     fenofibrate 160 MG Tab  Take 160 mg by mouth once daily.     gabapentin 300 MG capsule  Commonly known as:  NEURONTIN  Take 1 capsule (300 mg total) by mouth 2 (two) times daily.     glimepiride 2 MG tablet  Commonly known as:  AMARYL  Take 2 mg by mouth daily with breakfast. Per pt 1/2 to whole tablet a day     HYDROcodone-acetaminophen 7.5-325 mg per tablet  Commonly known as:  NORCO  Take 1 tablet by mouth every 12 (twelve) hours as needed for Pain.     isosorbide mononitrate 60 MG 24 hr tablet  Commonly known as:  IMDUR  Take 60 mg by mouth once daily.     LIPITOR 40 MG tablet  Generic drug:  atorvastatin  Take 1 tablet by mouth every evening.     lisinopril 2.5 MG tablet  Commonly known as:   PRINIVIL,ZESTRIL  Take 2.5 mg by mouth once daily.     MULTI-VITAMIN per tablet  Generic drug:  multivitamin  Take 1 tablet by mouth once daily.     nitroGLYCERIN 0.4 MG SL tablet  Commonly known as:  NITROSTAT  Place 1 tablet (0.4 mg total) under the tongue every 5 (five) minutes as needed for Chest pain.     omeprazole 20 MG capsule  Commonly known as:  PRILOSEC  Take 20 mg by mouth once daily.     ONETOUCH ULTRA TEST Strp  Generic drug:  blood sugar diagnostic     oxybutynin 5 MG Tab  Commonly known as:  DITROPAN  Take 5 mg by mouth every evening.     phenazopyridine 200 MG tablet  Commonly known as:  PYRIDIUM  Take 200 mg by mouth 3 (three) times daily as needed for Pain.     PROCEL WHEY PROTEIN ORAL  Take 1 Dose by mouth once daily. I  Scoop with a day with water.     rOPINIRole 4 MG tablet  Commonly known as:  REQUIP  Take 4-8 mg by mouth nightly.     saw palmetto 160 MG capsule  Take 160 mg by mouth 2 (two) times daily.     tamsulosin 0.4 mg Cap  Commonly known as:  FLOMAX  Take 1 capsule by mouth once daily.     terazosin 1 MG capsule  Commonly known as:  HYTRIN  Take 1 capsule by mouth Twice daily.     TOPROL XL 50 MG 24 hr tablet  Generic drug:  metoprolol succinate  Take 50 mg by mouth once daily.          Discharge Procedure Orders   Call MD for:  temperature >100.4     Call MD for:  persistent nausea and vomiting or diarrhea     Call MD for:  severe uncontrolled pain     Call MD for:  redness, tenderness, or signs of infection (pain, swelling, redness, odor or green/yellow discharge around incision site)     Call MD for:  difficulty breathing or increased cough     Call MD for:  severe persistent headache        Follow up with MD in 2-3 weeks    Discharge Procedure Orders (must include Diet, Follow-up, Activity):   Discharge Procedure Orders (must include Diet, Follow-up, Activity)   Call MD for:  temperature >100.4     Call MD for:  persistent nausea and vomiting or diarrhea     Call MD for:  severe  uncontrolled pain     Call MD for:  redness, tenderness, or signs of infection (pain, swelling, redness, odor or green/yellow discharge around incision site)     Call MD for:  difficulty breathing or increased cough     Call MD for:  severe persistent headache

## 2018-09-11 NOTE — DISCHARGE INSTRUCTIONS
Recovery After Procedural Sedation (Adult)  You have been given medicine by vein to make you sleep during your surgery. This may have included both a pain medicine and sleeping medicine. Most of the effects have worn off. But you may still have some drowsiness for the next 6 to 8 hours.  Home care  Follow these guidelines when you get home:  · For the next 8 hours, you should be watched by a responsible adult. This person should make sure your condition is not getting worse.  · Don't drink any alcohol for the next 24 hours.  · Don't drive, operate dangerous machinery, or make important business or personal decisions during the next 24 hours.  Note: Your healthcare provider may tell you not to take any medicine by mouth for pain or sleep in the next 4 hours. These medicines may react with the medicines you were given in the hospital. This could cause a much stronger response than usual.  Follow-up care  Follow up with your healthcare provider if you are not alert and back to your usual level of activity within 12 hours.  When to seek medical advice  Call your healthcare provider right away if any of these occur:  · Drowsiness gets worse  · Weakness or dizziness gets worse  · Repeated vomiting  · You can't be awakened   Date Last Reviewed: 10/18/2016  © 1235-6080 [employer]. [address] [place] [address], [place] [address]. All rights reserved. This information is not intended as a substitute for professional medical care. Always follow your healthcare professional's instructions.      Pain injection instructions:     Steroids take about a 2 weeks to relieve pain.  Initially you may get pain relief from the local anesthetic but this may wear off before the steroid works.    No driving for 24 hrs.   Activity as tolerated- gradually increase activities.  Dont lift over 10 lbs for 24 hrs   No heat at injection sites x 2 days. No heating pads, hot tubs, saunas, or swimming in any body of water or pool for 2  days.  Use ice pack for mild swelling and for comfort , apply for 20 minutes, remove for 20 minute intervals. No direct contact of ice itself  to skin.  May shower today. No tub baths for two days.      Resume Aspirin, Plavix, or Coumadin the day after the procedure unless otherwise instructed.   If diabetic,monitor your glucose carefully as steroids can increase your glucose level    Seek immediate medical help for:   Severe increase in your usual pain or appearance of new pain.  Prolonged (mor than 8 hours) or increasing weakness or numbness in the legs or arms.    - Numbing medicine was injected that affects nerves that carry information from       muscles to the  brain and the brain to the muscles.  This numbness can last 6-8 hrs so be very careful and get assistance when standing or walking.    Fever above 101 ,Drainage,redness,active bleeding, or increased swelling at the injection site.  Headache, shortness of breath, chest pain, or breathing problems.

## 2018-09-11 NOTE — INTERVAL H&P NOTE
The patient has been examined and the H&P has been reviewed:    I concur with the findings and no changes have occurred since H&P was written.   This patient has been cleared for surgery in an ambulatory surgical facility    ASA 3,  Mallampatti Score 3  No history of anesthetic complications  Plan for RN IV sedation      Anesthesia/Surgery risks, benefits and alternative options discussed and understood by patient/family.          Active Hospital Problems    Diagnosis  POA    Lumbar radiculitis [M54.16]  Yes      Resolved Hospital Problems   No resolved problems to display.

## 2018-09-11 NOTE — PLAN OF CARE
Pt states ready to go home , stable, tolerating fluids. Denies pain. Weakness noted to left leg. Pt and family instructed on fall precautions regarding left leg, understanding verbalized.Injection site ANA no drainage noted. Wheelchair to car accompanied by nurse. Home w/ family.

## 2018-09-11 NOTE — OP NOTE
PROCEDURE DATE: 9/11/2018    PROCEDURE: Left L4-5 and L5-S1 transforaminal epidural steroid injection under fluoroscopy    DIAGNOSIS: Lumbar disc displacement without myelopathy  Post op diagnosis: Same    PHYSICIAN: Dexter Rdz MD    MEDICATIONS INJECTED:  Dexamethasone 5mg (0.5ml) and 1.5ml 0.25% bupivicaine at each nerve root.     LOCAL ANESTHETIC INJECTED:  Lidocaine 1%. 2 ml per site.    SEDATION MEDICATIONS: RN IV sedation    ESTIMATED BLOOD LOSS:  None    COMPLICATIONS:  None    TECHNIQUE:   A time-out was taken to identify patient and procedure side prior to starting the procedure. The patient was placed in a prone position, prepped and draped in the usual sterile fashion using ChloraPrep and sterile towels.  The area to be injected was determined under fluoroscopic guidance in AP and oblique view.  Local anesthetic was given by raising a wheal and going down to the hub of a 25-gauge 1.5 inch needle.  In oblique view, a 3.5 inch 22-gauge bent-tip spinal needle was introduced towards 6 oclock position of the pedicle of each above named nerve root level.  The needle was walked medially then hinged into the neural foramen and position was confirmed in AP and lateral views.  1ml contrast dye was injected to confirm appropriate placement and that there was no vascular uptake.  After negative aspiration for blood or CSF, the medication was then injected. This was performed at the left L4-5 and L5-S1 level(s). The patient tolerated the procedure well.    The patient was monitored after the procedure.  Patient was given post procedure and discharge instructions to follow at home. The patient was discharged in a stable condition.

## 2018-09-13 VITALS
TEMPERATURE: 98 F | DIASTOLIC BLOOD PRESSURE: 72 MMHG | RESPIRATION RATE: 18 BRPM | HEART RATE: 59 BPM | HEIGHT: 71 IN | BODY MASS INDEX: 21.28 KG/M2 | SYSTOLIC BLOOD PRESSURE: 155 MMHG | WEIGHT: 152 LBS | OXYGEN SATURATION: 100 %

## 2018-09-18 ENCOUNTER — OFFICE VISIT (OUTPATIENT)
Dept: HEMATOLOGY/ONCOLOGY | Facility: CLINIC | Age: 83
End: 2018-09-18
Payer: MEDICARE

## 2018-09-18 ENCOUNTER — LAB VISIT (OUTPATIENT)
Dept: LAB | Facility: HOSPITAL | Age: 83
End: 2018-09-18
Attending: INTERNAL MEDICINE
Payer: MEDICARE

## 2018-09-18 VITALS
RESPIRATION RATE: 18 BRPM | TEMPERATURE: 99 F | SYSTOLIC BLOOD PRESSURE: 90 MMHG | BODY MASS INDEX: 21.58 KG/M2 | HEART RATE: 69 BPM | HEIGHT: 71 IN | WEIGHT: 154.13 LBS | DIASTOLIC BLOOD PRESSURE: 52 MMHG

## 2018-09-18 DIAGNOSIS — T45.1X5A CHEMOTHERAPY-INDUCED THROMBOCYTOPENIA: ICD-10-CM

## 2018-09-18 DIAGNOSIS — C79.51 PROSTATE CANCER METASTATIC TO BONE: ICD-10-CM

## 2018-09-18 DIAGNOSIS — C61 PROSTATE CANCER: Primary | ICD-10-CM

## 2018-09-18 DIAGNOSIS — D63.0 ANEMIA IN NEOPLASTIC DISEASE: ICD-10-CM

## 2018-09-18 DIAGNOSIS — D69.59 CHEMOTHERAPY-INDUCED THROMBOCYTOPENIA: ICD-10-CM

## 2018-09-18 DIAGNOSIS — R91.8 PULMONARY NODULES: ICD-10-CM

## 2018-09-18 DIAGNOSIS — C79.51 BONE METASTASES: ICD-10-CM

## 2018-09-18 DIAGNOSIS — C61 PROSTATE CANCER METASTATIC TO BONE: ICD-10-CM

## 2018-09-18 LAB
ALBUMIN SERPL BCP-MCNC: 3.6 G/DL
ALP SERPL-CCNC: 48 U/L
ALT SERPL W/O P-5'-P-CCNC: 24 U/L
ANION GAP SERPL CALC-SCNC: 8 MMOL/L
AST SERPL-CCNC: 21 U/L
BASOPHILS # BLD AUTO: 0.04 K/UL
BASOPHILS NFR BLD: 0.7 %
BILIRUB SERPL-MCNC: 0.3 MG/DL
BUN SERPL-MCNC: 35 MG/DL
CALCIUM SERPL-MCNC: 9.7 MG/DL
CHLORIDE SERPL-SCNC: 105 MMOL/L
CO2 SERPL-SCNC: 25 MMOL/L
COMPLEXED PSA SERPL-MCNC: 0.26 NG/ML
CREAT SERPL-MCNC: 1.4 MG/DL
DIFFERENTIAL METHOD: ABNORMAL
EOSINOPHIL # BLD AUTO: 0.2 K/UL
EOSINOPHIL NFR BLD: 2.5 %
ERYTHROCYTE [DISTWIDTH] IN BLOOD BY AUTOMATED COUNT: 15.2 %
EST. GFR  (AFRICAN AMERICAN): 53 ML/MIN/1.73 M^2
EST. GFR  (NON AFRICAN AMERICAN): 46 ML/MIN/1.73 M^2
GLUCOSE SERPL-MCNC: 127 MG/DL
HCT VFR BLD AUTO: 31 %
HGB BLD-MCNC: 10 G/DL
LDH SERPL L TO P-CCNC: 425 U/L
LYMPHOCYTES # BLD AUTO: 1 K/UL
LYMPHOCYTES NFR BLD: 17.2 %
MAGNESIUM SERPL-MCNC: 2 MG/DL
MCH RBC QN AUTO: 31 PG
MCHC RBC AUTO-ENTMCNC: 32.3 G/DL
MCV RBC AUTO: 96 FL
MONOCYTES # BLD AUTO: 0.6 K/UL
MONOCYTES NFR BLD: 9.3 %
NEUTROPHILS # BLD AUTO: 4.2 K/UL
NEUTROPHILS NFR BLD: 70.3 %
NRBC BLD-RTO: 0 /100 WBC
PLATELET # BLD AUTO: 135 K/UL
PMV BLD AUTO: 10.3 FL
POTASSIUM SERPL-SCNC: 4.7 MMOL/L
PROT SERPL-MCNC: 6.2 G/DL
RBC # BLD AUTO: 3.23 M/UL
SODIUM SERPL-SCNC: 138 MMOL/L
WBC # BLD AUTO: 5.92 K/UL

## 2018-09-18 PROCEDURE — 1101F PT FALLS ASSESS-DOCD LE1/YR: CPT | Mod: CPTII,,, | Performed by: NURSE PRACTITIONER

## 2018-09-18 PROCEDURE — 83735 ASSAY OF MAGNESIUM: CPT | Mod: PN

## 2018-09-18 PROCEDURE — 84153 ASSAY OF PSA TOTAL: CPT

## 2018-09-18 PROCEDURE — 3074F SYST BP LT 130 MM HG: CPT | Mod: CPTII,,, | Performed by: NURSE PRACTITIONER

## 2018-09-18 PROCEDURE — 3078F DIAST BP <80 MM HG: CPT | Mod: CPTII,,, | Performed by: NURSE PRACTITIONER

## 2018-09-18 PROCEDURE — 85025 COMPLETE CBC W/AUTO DIFF WBC: CPT

## 2018-09-18 PROCEDURE — 80053 COMPREHEN METABOLIC PANEL: CPT | Mod: PN

## 2018-09-18 PROCEDURE — 83615 LACTATE (LD) (LDH) ENZYME: CPT | Mod: PN

## 2018-09-18 PROCEDURE — 83735 ASSAY OF MAGNESIUM: CPT

## 2018-09-18 PROCEDURE — 84153 ASSAY OF PSA TOTAL: CPT | Mod: PN

## 2018-09-18 PROCEDURE — 99215 OFFICE O/P EST HI 40 MIN: CPT | Mod: PBBFAC,PN | Performed by: NURSE PRACTITIONER

## 2018-09-18 PROCEDURE — 83615 LACTATE (LD) (LDH) ENZYME: CPT

## 2018-09-18 PROCEDURE — 99213 OFFICE O/P EST LOW 20 MIN: CPT | Mod: S$PBB,,, | Performed by: NURSE PRACTITIONER

## 2018-09-18 PROCEDURE — 36415 COLL VENOUS BLD VENIPUNCTURE: CPT | Mod: PN

## 2018-09-18 PROCEDURE — 85025 COMPLETE CBC W/AUTO DIFF WBC: CPT | Mod: PN

## 2018-09-18 PROCEDURE — 80053 COMPREHEN METABOLIC PANEL: CPT

## 2018-09-18 PROCEDURE — 99999 PR PBB SHADOW E&M-EST. PATIENT-LVL V: CPT | Mod: PBBFAC,,, | Performed by: NURSE PRACTITIONER

## 2018-09-18 NOTE — PROGRESS NOTES
HISTORY OF PRESENT ILLNESS:  This is an 83-year-old gentleman well known   to Dr. Fernandez for metastatic prostate carcinoma involving bone.  The patient also   has indeterminate pulmonary nodules.  His treatment of Abiraterone/prednisone   has been placed on hold.  He continues with quarterly Xgeva.  He returns to clinic   earlier than scheduled per his request for evaluation and labs.  He denies any fevers,   chills, drenching night sweats, unexplained weight loss, abdominal discomfort,   nausea, vomiting, mouth sores, etc.  No other complaints or pertinent findings on   a 14-point review of systems.    PHYSICAL EXAMINATION:  GENERAL:  Well-developed, elderly, frail-appearing white gentleman in no   acute distress.  Alert & oriented x 3.  VITAL SIGNS:  Weight:  Loss of 2 pounds/8 weeks  Wt Readings from Last 3 Encounters:   09/18/18 69.9 kg (154 lb 1.6 oz)   09/10/18 68.9 kg (152 lb)   08/28/18 68.9 kg (152 lb)     Temp Readings from Last 3 Encounters:   09/18/18 98.5 °F (36.9 °C)   09/11/18 97.5 °F (36.4 °C) (Skin)   07/10/18 98.2 °F (36.8 °C)     BP Readings from Last 3 Encounters:   09/18/18 (!) 90/52   09/11/18 (!) 155/72   08/28/18 125/65     Pulse Readings from Last 3 Encounters:   09/18/18 69   09/11/18 (!) 59   08/28/18 (!) 57     HEENT:  Normocephalic, atraumatic.  Oral mucosa pink and moist.  Lips without   lesions.  Tongue midline.  Oropharynx clear.  Nonicteric sclerae.   NECK:  Supple, no adenopathy.  No carotid bruits, thyromegaly or thyroid nodule  HEART:  Regular rate and rhythm without murmur, gallop or rub.   LUNGS:  Clear to auscultation bilaterally.  Normal respiratory effort.       ABDOMEN:  Soft, nontender, nondistended with positive normoactive bowel sounds,   no hepatosplenomegaly.  EXTREMITIES:  Mild ankle edema bilateral, no cyanosis or clubbing.  Distal pulses are intact.  AXILLAE AND GROIN:  No palpable pathologic lymphadenopathy is appreciated.  SKIN:  Intact/turgor normal.  NEUROLOGIC:   Cranial nerves II-XII grossly intact.  Motor:  Good muscle bulk and   tone.  Strength/sensory 5/5 throughout.     LABORATORY DATA:    Lab Results   Component Value Date    WBC 5.92 09/18/2018    HGB 10.0 (L) 09/18/2018    HCT 31.0 (L) 09/18/2018    MCV 96 09/18/2018     (L) 09/18/2018     Differential remarkable for 17.2% lymph    CMP  Sodium   Date Value Ref Range Status   09/18/2018 138 136 - 145 mmol/L Final     Potassium   Date Value Ref Range Status   09/18/2018 4.7 3.5 - 5.1 mmol/L Final     Chloride   Date Value Ref Range Status   09/18/2018 105 95 - 110 mmol/L Final     CO2   Date Value Ref Range Status   09/18/2018 25 22 - 31 mmol/L Final     Glucose   Date Value Ref Range Status   09/18/2018 127 (H) 70 - 110 mg/dL Final     Comment:     The ADA recommends the following guidelines for fasting glucose:  Normal:       less than 100 mg/dL  Prediabetes:  100 mg/dL to 125 mg/dL  Diabetes:     126 mg/dL or higher       BUN, Bld   Date Value Ref Range Status   09/18/2018 35 (H) 9 - 21 mg/dL Final     Creatinine   Date Value Ref Range Status   09/18/2018 1.40 0.50 - 1.40 mg/dL Final     Calcium   Date Value Ref Range Status   09/18/2018 9.7 8.4 - 10.2 mg/dL Final     Total Protein   Date Value Ref Range Status   09/18/2018 6.2 6.0 - 8.4 g/dL Final     Albumin   Date Value Ref Range Status   09/18/2018 3.6 3.5 - 5.2 g/dL Final     Total Bilirubin   Date Value Ref Range Status   09/18/2018 0.3 0.2 - 1.3 mg/dL Final     Alkaline Phosphatase   Date Value Ref Range Status   09/18/2018 48 38 - 145 U/L Final     AST   Date Value Ref Range Status   09/18/2018 21 17 - 59 U/L Final     ALT   Date Value Ref Range Status   09/18/2018 24 10 - 44 U/L Final     Anion Gap   Date Value Ref Range Status   09/18/2018 8 8 - 16 mmol/L Final     eGFR if    Date Value Ref Range Status   09/18/2018 53 (A) >60 mL/min/1.73 m^2 Final     eGFR if non    Date Value Ref Range Status   09/18/2018 46 (A)  >60 mL/min/1.73 m^2 Final     Comment:     Calculation used to obtain the estimated glomerular filtration  rate (eGFR) is the CKD-EPI equation.        , Magnesium 2.0  PSA 0.26    IMPRESSION:  1.  Metastatic prostate carcinoma involving bone -- clinically stable.  2.  Indeterminate pulmonary nodules -- stable.  3.  Treatment-associated anemia - stable  4.  Mild treatment associated thrombocytopenia, not in need of intervention.    PLAN:  1.  Continue to HOLD Abiraterone/Prednisone in light of lower extremity weakness/disequilibrium.  2.  Continue XGEVA - due 10/10/18  3.  Continue calcium supplementation with vitamin D.  4.  Return to clinic in 1 month as scheduled with interval CBC, CMP, LDH, magnesium and PSA prior.      Assessment/plan reviewed and approved by Dr. Fernandez.

## 2018-10-01 ENCOUNTER — OFFICE VISIT (OUTPATIENT)
Dept: PAIN MEDICINE | Facility: CLINIC | Age: 83
End: 2018-10-01
Payer: MEDICARE

## 2018-10-01 VITALS
WEIGHT: 154 LBS | DIASTOLIC BLOOD PRESSURE: 58 MMHG | SYSTOLIC BLOOD PRESSURE: 93 MMHG | HEIGHT: 71 IN | BODY MASS INDEX: 21.56 KG/M2 | HEART RATE: 72 BPM

## 2018-10-01 DIAGNOSIS — C61 PROSTATE CANCER: ICD-10-CM

## 2018-10-01 DIAGNOSIS — M54.16 LUMBAR RADICULITIS: ICD-10-CM

## 2018-10-01 DIAGNOSIS — M51.36 DDD (DEGENERATIVE DISC DISEASE), LUMBAR: Primary | ICD-10-CM

## 2018-10-01 PROCEDURE — 99214 OFFICE O/P EST MOD 30 MIN: CPT | Mod: S$PBB,,, | Performed by: ANESTHESIOLOGY

## 2018-10-01 PROCEDURE — 3078F DIAST BP <80 MM HG: CPT | Mod: CPTII,,, | Performed by: ANESTHESIOLOGY

## 2018-10-01 PROCEDURE — 3074F SYST BP LT 130 MM HG: CPT | Mod: CPTII,,, | Performed by: ANESTHESIOLOGY

## 2018-10-01 PROCEDURE — 99999 PR PBB SHADOW E&M-EST. PATIENT-LVL III: CPT | Mod: PBBFAC,,, | Performed by: ANESTHESIOLOGY

## 2018-10-01 PROCEDURE — 1100F PTFALLS ASSESS-DOCD GE2>/YR: CPT | Mod: CPTII,,, | Performed by: ANESTHESIOLOGY

## 2018-10-01 PROCEDURE — 3288F FALL RISK ASSESSMENT DOCD: CPT | Mod: CPTII,,, | Performed by: ANESTHESIOLOGY

## 2018-10-01 PROCEDURE — 99213 OFFICE O/P EST LOW 20 MIN: CPT | Mod: PBBFAC,PN | Performed by: ANESTHESIOLOGY

## 2018-10-01 RX ORDER — SPIRONOLACTONE 25 MG/1
25 TABLET ORAL 2 TIMES DAILY PRN
Refills: 0 | COMMUNITY
Start: 2018-09-10

## 2018-10-01 NOTE — PROGRESS NOTES
This note was completed with dictation software and grammatical errors may exist.    Referring Physician: No ref. provider found    PCP: Dariel Casiano MD      CC:  Left leg pain    Interval history:  Patient returns to our clinic.  He underwent a left L4-5 L5-S1 TFESI on 09/14/2018.  He reports greater than 50% relief of his radiating left leg pain.  He does have some distal left leg pain due to peripheral neuropathy.  Overall, he feels his pain is much improved.  He is ambulating more with less pain.  He is doing more exercises.  He is pleased with his progress.  He denies any worsening weakness.  No bowel bladder changes.    Prior HPI:   Cameron Bridges is a 83 y.o. male referred to us for left leg pain. Pain has been present over 6 months.  No recent traumatic incident.  He has significant history of prostate cancer with bony mets to his hips, knees.  Pain is a constant aching, throbbing pain in his lower back.  Pain radiates down his left leg into his left ankle.  Pain worsens with walking, getting up in the morning.  Pain improves with rest.  He recently had x-rays of his lumbar spine which showed degenerative changes at L4-5 and L5-S1.  He has tried physical therapy with minimal benefit.  He has not had any interventional procedures.  He denies any worsening weakness.  No bowel bladder changes.    ROS:  CONSTITUTIONAL: No fevers, chills, night sweats, wt. loss, appetite changes  SKIN: no rashes or itching  ENT: No headaches, head trauma, vision changes, or eye pain  LYMPH NODES: None noticed   CV: No chest pain, palpitations.   RESP: No shortness of breath, dyspnea on exertion, cough, wheezing, or hemoptysis  GI: No nausea, emesis, diarrhea, constipation, melena, hematochezia, pain.    : No dysuria, hematuria, urgency, or frequency   HEME: No easy bruising, bleeding problems  PSYCHIATRIC: No depression, anxiety, psychosis, hallucinations.  NEURO: No seizures, memory loss, dizziness or difficulty  sleeping  MSK:  Positive HPI      Past Medical History:   Diagnosis Date    Anemia 3/26/2013    Anticoagulant long-term use     Arthritis     osteo    Cerebrovascular disease     Colon polyp     Complication of anesthesia     post-op confusion    COPD (chronic obstructive pulmonary disease)     has inhalers, does not use, no oxygen    Coronary artery disease     previous ejection fraction 32%,last echo 2012 now  65%; stents & CABG    Diabetes mellitus     type II, controlled, per spouse    Elevated PSA     Encounter for blood transfusion     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/2017    d/t cancer meds    Hearing loss in left ear     Hyperlipidemia     Hypertension     Hypertrophy (benign) of prostate     Light smoker     Peripheral vascular disease     pain at night, mild claudication, but no Hx clots    Peripheral vascular disease with claudication 3/31/2017    Prostate cancer     Prostate & bone cancer    Rectal prolapse 2012    sometimes bleeds    Rotator cuff tear     bilateral    Ulcer     in  past     Past Surgical History:   Procedure Laterality Date    AORTOGRAM WITH RUNOFF Right 2/17/2017    [facility] CATH    AORTOGRAM WITH RUNOFF Bilateral 9/1/2016    [facility] at [facility] CATH    APPENDECTOMY      BIOPSY, PROSTATE Bilateral 5/14/2012    [facility] at [facility] OR    IIVQJB-HVGFGP-ZC N/A 8/31/2015    [facility] OR 2ND FLR    FTATTX-XEDDYFZ-[id number] Right 3/29/2017    [facility] at [facility] OR    CARDIAC SURGERY      CAROTID STENT      right    CHOLECYSTECTOMY      CORONARY ARTERY BYPASS GRAFT  2001    X 4    CORONARY STENT PLACEMENT  last 2009    total 4    CYSTOSCOPY      CYSTOSCOPY WITH RETROGRADE PYELOGRAM Bilateral 1/22/2018    Performed [facility] OR    CYSTOSCOPY WITH RETROGRADE PYELOGRAM Bilateral 8/28/2017    Performed  [facility] at [facility] OR    CYSTOSCOPY, FLEXIBLE N/A 5/14/2012    [facility] OR    EXCISION-BLADDER TUMOR-TRANSURETHRAL (TURBT) N/A 8/28/2017    Performed by [facility] at [facility] OR    EXTRACTION - STONE N/A 8/28/2017    Performed by [facility] at [facility] OR    FEMORAL ARTERY STENT Left     FEMORAL BYPASS Right 2017    HERNIA REPAIR      umbillical    Injection,steroid,epidural,transforaminal approach Left 9/11/2018    [facility] OR    LITHOTRIPSY-LASER - Bladder Stone N/A 1/22/2018    Performed by Thai Delaney MD at [facility] OR    TUMOR-BLADDER-TRANSURETHRAL RESECTION N/A 1/22/2018    Performed by Thai [facility]aney [facility] at [facility] OR    VASCULAR SURGERY  2010    carotid endarterectomy,left 2010    VASECTOMY       Family History   Adopted: Yes   Problem Relation Age of Onset    Stroke Father     Heart disease Mother     Hypertension Mother      Social History     Socioeconomic History    Marital status:      Spouse name: None    Number of children: None    Years of education: None    Highest education level: None   Social Needs    Financial resource strain: None    Food insecurity - worry: None    Food insecurity - inability: None    Transportation needs - medical: None    Transportation needs - non-medical: None   Occupational History    Occupation: [employer]   Tobacco Use    Smoking status: Current Every Day Smoker     Packs/day: 0.50     Years: 60.00     Pack years: 30.00     Types: Cigarettes    Smokeless tobacco: Current User     Types: Chew   Substance and Sexual Activity    Alcohol use: Yes     Alcohol/week: 3.0 oz     Types: 5 Shots of liquor per week     Comment: 1 shot of bourbon or a beer a day    Drug use: No    Sexual activity: None   Other Topics Concern    None   Social History Narrative    None         Medications/Allergies: See med card    Vitals:    10/01/18 1056   BP:  "(!) 93/58   Pulse: 72   Weight: 69.9 kg (154 lb)   Height: 5' 11" (1.803 m)   PainSc:   4   PainLoc: Back         Physical exam:    GENERAL: A and O x3, the patient appears well groomed and is in no acute distress.  Skin: No rashes or obvious lesions  HEENT: normocephalic, atraumatic  CARDIOVASCULAR:  Palpable peripheral pulses  LUNGS: easy work of breathing  ABDOMEN: soft, nontender   UPPER EXTREMITIES: Normal alignment, normal range of motion, no atrophy, no skin changes,  hair growth and nail growth normal and equal bilaterally. No swelling, no tenderness.    LOWER EXTREMITIES:  Normal alignment, normal range of motion, no atrophy, no skin changes,  hair growth and nail growth normal and equal bilaterally. No swelling, no tenderness.    LUMBAR SPINE  Lumbar spine: ROM is mildly limited with flexion extension and oblique extension with moderate increased pain.    Neo's test causes no increased pain on either side.    Supine straight leg raise is negative bilaterally.    Internal and external rotation of the hip causes no increased pain on either side.  Myofascial exam: No tenderness to palpation across lumbar paraspinous muscles.      MENTAL STATUS: normal orientation, speech, language, and fund of knowledge for social situation.  Emotional state appropriate.    CRANIAL NERVES:  II:  PERRL bilaterally,   III,IV,VI: EOMI.    V:  Facial sensation equal bilaterally  VII:  Facial motor function normal.  VIII:  Hearing equal to finger rub bilaterally  IX/X: Gag normal, palate symmetric  XI:  Shoulder shrug equal, head turn equal  XII:  Tongue midline without fasciculations      MOTOR: Tone and bulk: normal bilateral upper and lower Strength: normal   Delt Bi Tri WE WF     R 5 5 5 5 5 5   L 5 5 5 5 5 5     IP ADD ABD Quad TA Gas HAM  R 5 5 5 5 5 5 5  L 5 5 5 5 5 5 5    SENSATION: Light touch and pinprick intact bilaterally  REFLEXES: normal, symmetric, nonbrisk.  Toes down, no clonus. No hoffmans.  GAIT: normal " rise, base, steps, and arm swing.        Imaging:  X-ray lumbar spine 08/09/2018   1. Extensive sclerotic metastatic disease secondary to patient's known prostate cancer.  No acute osseous finding.    2.  Multilevel degenerative changes most significant at L4-L5 and L5-S1 as above.    Assessment:  Patient referred for low back and left leg pain  1. DDD (degenerative disc disease), lumbar    2. Lumbar radiculitis    3. Prostate cancer          Plan:  1. I have stressed the importance of physical activity and exercise to improve overall health  2. Reviewed imaging with patient  3. Patient with significant benefit following Lumbar MARIA DE JESUS.  Patient will continue to monitor his progress.  May consider repeat procedure in future if pain returns or worsens.   4. Follow up as needed

## 2018-10-10 ENCOUNTER — OFFICE VISIT (OUTPATIENT)
Dept: HEMATOLOGY/ONCOLOGY | Facility: CLINIC | Age: 83
End: 2018-10-10
Payer: MEDICARE

## 2018-10-10 ENCOUNTER — INFUSION (OUTPATIENT)
Dept: INFUSION THERAPY | Facility: HOSPITAL | Age: 83
End: 2018-10-10
Attending: INTERNAL MEDICINE
Payer: MEDICARE

## 2018-10-10 VITALS
BODY MASS INDEX: 21.73 KG/M2 | RESPIRATION RATE: 18 BRPM | WEIGHT: 155.19 LBS | DIASTOLIC BLOOD PRESSURE: 51 MMHG | SYSTOLIC BLOOD PRESSURE: 102 MMHG | TEMPERATURE: 99 F | HEART RATE: 88 BPM | HEIGHT: 71 IN

## 2018-10-10 DIAGNOSIS — C61 PROSTATE CANCER: ICD-10-CM

## 2018-10-10 DIAGNOSIS — E86.0 DEHYDRATION: Primary | ICD-10-CM

## 2018-10-10 DIAGNOSIS — C61 PROSTATE CANCER: Primary | ICD-10-CM

## 2018-10-10 DIAGNOSIS — C79.51 BONE METASTASES: ICD-10-CM

## 2018-10-10 DIAGNOSIS — R19.7 DIARRHEA, UNSPECIFIED TYPE: ICD-10-CM

## 2018-10-10 DIAGNOSIS — R91.8 PULMONARY NODULES: ICD-10-CM

## 2018-10-10 PROCEDURE — 96372 THER/PROPH/DIAG INJ SC/IM: CPT | Mod: PN

## 2018-10-10 PROCEDURE — 1101F PT FALLS ASSESS-DOCD LE1/YR: CPT | Mod: CPTII,,, | Performed by: INTERNAL MEDICINE

## 2018-10-10 PROCEDURE — 99214 OFFICE O/P EST MOD 30 MIN: CPT | Mod: S$PBB,,, | Performed by: INTERNAL MEDICINE

## 2018-10-10 PROCEDURE — 3078F DIAST BP <80 MM HG: CPT | Mod: CPTII,,, | Performed by: INTERNAL MEDICINE

## 2018-10-10 PROCEDURE — 63600175 PHARM REV CODE 636 W HCPCS: Mod: TB,PN | Performed by: INTERNAL MEDICINE

## 2018-10-10 PROCEDURE — 99999 PR PBB SHADOW E&M-EST. PATIENT-LVL III: CPT | Mod: PBBFAC,,, | Performed by: INTERNAL MEDICINE

## 2018-10-10 PROCEDURE — 3074F SYST BP LT 130 MM HG: CPT | Mod: CPTII,,, | Performed by: INTERNAL MEDICINE

## 2018-10-10 PROCEDURE — 99213 OFFICE O/P EST LOW 20 MIN: CPT | Mod: PBBFAC,PN | Performed by: INTERNAL MEDICINE

## 2018-10-10 RX ADMIN — DENOSUMAB 120 MG: 120 INJECTION SUBCUTANEOUS at 11:10

## 2018-10-10 NOTE — PROGRESS NOTES
HISTORY OF PRESENT ILLNESS:  An 83-year-old gentleman with androgen-independent   prostate carcinoma involving bone, who had responded well to abiraterone and   prednisone, but discontinued this medication due to difficulties with   disequilibrium and lower extremity weakness.  He continues to receive quarterly   Xgeva for prevention of skeletal adverse event and returns to clinic for same.    He is struggling with his wife's health.  She is currently in the ER with a   swollen foot.  Physicians have been debating whether this represents an   infection or gout.  No other complaints or pertinent findings on a 14-point   review of systems.    PHYSICAL EXAMINATION:  GENERAL:  Well-developed, well-nourished, thin-appearing white gentleman, in no   acute distress, with a weight of 155-1/2 pounds (increased by 1-1/2 pounds).  VITAL SIGNS:  Documented in EMR and reviewed.  HEENT:  Normocephalic, atraumatic.  Oral mucosa pink and moist.  Lips without   lesions.  Tongue midline.  Oropharynx clear.  Nonicteric sclerae.   NECK:  Supple, no adenopathy.  No carotid bruits, thyromegaly or thyroid nodule.  HEART:  Regular rate and rhythm without murmur, gallop or rub.                LUNGS:  Clear to auscultation bilaterally.  Normal respiratory effort.       ABDOMEN:  Soft, nontender, nondistended with positive normoactive bowel sounds,   no hepatosplenomegaly.    EXTREMITIES:  No cyanosis, clubbing or edema.  Distal pulses are intact.                                              AXILLAE AND GROIN:  No palpable pathologic lymphadenopathy is appreciated.        SKIN:  Intact/turgor normal.  NEUROLOGIC:  Cranial nerves II-XII grossly intact.  Motor:  Good muscle bulk and   tone.  Strength/sensory 5/5 throughout.  Gait stable.    LABORATORY:  White count 5.9, H and H 10.3 and 30.9, platelet count of 128, ANC   4500.  Sodium 137, potassium 4.5, chloride 105, CO2 23, BUN 48, creatinine 1.5,   glucose 145, calcium 9.7.  Liver function  tests within normal limits.  .    GFR 42.    IMPRESSION:  Androgen-independent prostate carcinoma involving bone.    PLAN:  1.  Repeat Xgeva.  2.  Continue calcium supplementation with vitamin D.  3.  Abiraterone/prednisone to remain on hold.  4.  Return to clinic three months from now with interval CBC, CMP, LDH, mag, and   PSA prior to appointment.  5.  Review today's pending PSA as it becomes available.      FADUMO/HN  dd: 10/10/2018 11:36:20 (CDT)  td: 10/11/2018 01:42:56 (CDT)  Doc ID   #[id number]  Job ID #[id number]    CC:

## 2018-10-18 ENCOUNTER — TELEPHONE (OUTPATIENT)
Dept: SPINE | Facility: CLINIC | Age: 83
End: 2018-10-18

## 2018-10-18 NOTE — TELEPHONE ENCOUNTER
----- Message from Mackenzie Reed sent at 10/18/2018  4:43 PM CDT -----  Contact: kamadou [phone]  States that he is having pain in the calf of his leg. Please call back at [phone]//thank you acc

## 2018-10-22 ENCOUNTER — TELEPHONE (OUTPATIENT)
Dept: PAIN MEDICINE | Facility: CLINIC | Age: 83
End: 2018-10-22

## 2018-10-22 NOTE — TELEPHONE ENCOUNTER
----- Message from Melani De Oliveira sent at 10/22/2018  9:39 AM CDT -----  Contact: self  Pt calling to discuss leg pains. Please call [phone].

## 2018-10-22 NOTE — TELEPHONE ENCOUNTER
Pt called in c/o left calf pain. He had a lumbar TF MARIA DE JESUS on 9/11/18. He stated the injection helped with his leg pain but not his calf pain.

## 2018-11-05 ENCOUNTER — HOSPITAL ENCOUNTER (OUTPATIENT)
Dept: RADIOLOGY | Facility: HOSPITAL | Age: 83
Discharge: HOME | End: 2018-11-05
Attending: THORACIC SURGERY (CARDIOTHORACIC VASCULAR SURGERY)
Payer: MEDICARE

## 2018-11-05 DIAGNOSIS — I73.9 PERIPHERAL VASCULAR DISEASE, UNSPECIFIED: ICD-10-CM

## 2018-11-05 DIAGNOSIS — I73.9 PERIPHERAL VASCULAR DISEASE, UNSPECIFIED: Primary | ICD-10-CM

## 2018-11-05 PROCEDURE — 93922 UPR/L XTREMITY ART 2 LEVELS: CPT | Mod: 26,,, | Performed by: RADIOLOGY

## 2018-11-05 PROCEDURE — 93922 UPR/L XTREMITY ART 2 LEVELS: CPT | Mod: TC,PO

## 2018-11-05 PROCEDURE — 93925 LOWER EXTREMITY STUDY: CPT | Mod: 26,,, | Performed by: RADIOLOGY

## 2018-11-08 ENCOUNTER — OFFICE VISIT (OUTPATIENT)
Dept: VASCULAR SURGERY | Facility: CLINIC | Age: 83
End: 2018-11-08
Payer: MEDICARE

## 2018-11-08 VITALS
HEIGHT: 71 IN | WEIGHT: 155.63 LBS | DIASTOLIC BLOOD PRESSURE: 57 MMHG | HEART RATE: 75 BPM | SYSTOLIC BLOOD PRESSURE: 108 MMHG | BODY MASS INDEX: 21.79 KG/M2

## 2018-11-08 DIAGNOSIS — I73.9 PERIPHERAL VASCULAR DISEASE, UNSPECIFIED: Primary | ICD-10-CM

## 2018-11-08 DIAGNOSIS — I73.9 PAD (PERIPHERAL ARTERY DISEASE): Primary | ICD-10-CM

## 2018-11-08 PROCEDURE — 3074F SYST BP LT 130 MM HG: CPT | Mod: CPTII,S$GLB,, | Performed by: THORACIC SURGERY (CARDIOTHORACIC VASCULAR SURGERY)

## 2018-11-08 PROCEDURE — 3078F DIAST BP <80 MM HG: CPT | Mod: CPTII,S$GLB,, | Performed by: THORACIC SURGERY (CARDIOTHORACIC VASCULAR SURGERY)

## 2018-11-08 PROCEDURE — 99213 OFFICE O/P EST LOW 20 MIN: CPT | Mod: S$GLB,,, | Performed by: THORACIC SURGERY (CARDIOTHORACIC VASCULAR SURGERY)

## 2018-11-08 PROCEDURE — 1101F PT FALLS ASSESS-DOCD LE1/YR: CPT | Mod: CPTII,S$GLB,, | Performed by: THORACIC SURGERY (CARDIOTHORACIC VASCULAR SURGERY)

## 2018-11-08 PROCEDURE — 99999 PR PBB SHADOW E&M-EST. PATIENT-LVL III: CPT | Mod: PBBFAC,,, | Performed by: THORACIC SURGERY (CARDIOTHORACIC VASCULAR SURGERY)

## 2018-11-08 RX ORDER — TEMAZEPAM 30 MG/1
30 CAPSULE ORAL NIGHTLY
Refills: 0 | COMMUNITY
Start: 2018-10-22

## 2018-11-08 RX ORDER — HYDROCODONE BITARTRATE AND ACETAMINOPHEN 10; 325 MG/1; MG/1
1 TABLET ORAL EVERY 12 HOURS PRN
Refills: 0 | COMMUNITY
Start: 2018-09-22

## 2018-11-08 RX ORDER — LIDOCAINE HYDROCHLORIDE 10 MG/ML
1 INJECTION, SOLUTION EPIDURAL; INFILTRATION; INTRACAUDAL; PERINEURAL ONCE
Status: DISCONTINUED | OUTPATIENT
Start: 2018-11-08 | End: 2018-11-15

## 2018-11-08 NOTE — PROGRESS NOTES
OFFICE NOTE    This is an 83-year-old gentleman with peripheral arterial disease.  I have done   a femoral popliteal bypass grafting procedure on the gentleman within the last   few years.  He has claudication in the left lower extremity now and occasional   rest pain.  Recent ultrasound shows markedly diminished ankle brachial index on   the left and very diminished pressures in the calf and foot as well as flow.  He   has been a long time smoker.  He has had previous coronary bypass grafting on   two separate occasions.  I believe his heart he describes is doing well.  He has   a history of prostate cancer that is controlled medically fairly well.  He has   no shortness of breath or chest pain.  He is quite active despite his age.  His   medicines are within the EPIC record.    PHYSICAL EXAMINATION:    GENERAL:  Today, he is awake, alert, in no obvious distress.  HEENT:  Pupils are equal, round, reactive to light.  Nose and throat are clear.  NECK:  Supple.  CHEST:  Clear to auscultation.  HEART:  Regular rate and rhythm.  ABDOMEN:  Benign.  EXTREMITIES:  Femoral pulses are diminished on the left compared to the right.    Pedal pulses are markedly diminished and monophasic on the left compared to the   right.    Recent ultrasound is noted.  The patient has significant superficial femoral   artery disease on the left and he is quite symptomatic.  We will arrange for   angiography of the extremities and hopefully percutaneous intervention in the   left lower extremity.      ANA  dd: 11/08/2018 08:23:45 (CST)  td: 11/08/2018 12:19:06 (CST)  Doc ID   #[id number]  Job ID #[id number]    CC:

## 2018-12-06 ENCOUNTER — OFFICE VISIT (OUTPATIENT)
Dept: VASCULAR SURGERY | Facility: CLINIC | Age: 83
End: 2018-12-06
Payer: MEDICARE

## 2018-12-06 VITALS
HEIGHT: 71 IN | HEART RATE: 79 BPM | BODY MASS INDEX: 21.96 KG/M2 | SYSTOLIC BLOOD PRESSURE: 100 MMHG | DIASTOLIC BLOOD PRESSURE: 49 MMHG | WEIGHT: 156.88 LBS

## 2018-12-06 DIAGNOSIS — Z98.62 S/P ANGIOPLASTY: ICD-10-CM

## 2018-12-06 PROCEDURE — 99211 OFF/OP EST MAY X REQ PHY/QHP: CPT | Mod: S$GLB,,, | Performed by: THORACIC SURGERY (CARDIOTHORACIC VASCULAR SURGERY)

## 2018-12-06 PROCEDURE — 3074F SYST BP LT 130 MM HG: CPT | Mod: CPTII,S$GLB,, | Performed by: THORACIC SURGERY (CARDIOTHORACIC VASCULAR SURGERY)

## 2018-12-06 PROCEDURE — 3078F DIAST BP <80 MM HG: CPT | Mod: CPTII,S$GLB,, | Performed by: THORACIC SURGERY (CARDIOTHORACIC VASCULAR SURGERY)

## 2018-12-06 PROCEDURE — 99999 PR PBB SHADOW E&M-EST. PATIENT-LVL III: CPT | Mod: PBBFAC,,, | Performed by: THORACIC SURGERY (CARDIOTHORACIC VASCULAR SURGERY)

## 2018-12-06 NOTE — PROGRESS NOTES
OFFICE NOTE    HISTORY OF PRESENT ILLNESS:  This is an 83-year-old gentleman status post recent   angiography and angioplasty of the left superficial femoral and popliteal   arteries.  He comes back to the office today in followup.  He has done well.  He   actually has little to no claudication.  He is in good spirits today.  His   medicines are noted.    PHYSICAL EXAMINATION:  The puncture sites related to the angiogram are intact,   but perfusion to the left leg and foot are satisfactory.    At this point, he can resume his usual activities without restriction.  We   should get a repeat arterial duplex of both extremities in two months and then   based on this make further recommendations.      ANA  dd: 12/06/2018 10:50:21 (CST)  td: 12/06/2018 23:54:34 (CST)  Doc ID   #[id number]  Job ID #[id number]    CC:

## 2019-01-09 ENCOUNTER — INFUSION (OUTPATIENT)
Dept: INFUSION THERAPY | Facility: HOSPITAL | Age: 84
End: 2019-01-09
Attending: INTERNAL MEDICINE
Payer: MEDICARE

## 2019-01-09 ENCOUNTER — OFFICE VISIT (OUTPATIENT)
Dept: HEMATOLOGY/ONCOLOGY | Facility: CLINIC | Age: 84
End: 2019-01-09
Payer: MEDICARE

## 2019-01-09 VITALS
RESPIRATION RATE: 18 BRPM | DIASTOLIC BLOOD PRESSURE: 53 MMHG | WEIGHT: 156.31 LBS | BODY MASS INDEX: 21.88 KG/M2 | TEMPERATURE: 99 F | SYSTOLIC BLOOD PRESSURE: 91 MMHG | HEIGHT: 71 IN | HEART RATE: 84 BPM

## 2019-01-09 DIAGNOSIS — C61 PROSTATE CANCER: Primary | ICD-10-CM

## 2019-01-09 DIAGNOSIS — C79.51 BONE METASTASES: ICD-10-CM

## 2019-01-09 DIAGNOSIS — C61 PROSTATE CANCER: ICD-10-CM

## 2019-01-09 DIAGNOSIS — R19.7 DIARRHEA, UNSPECIFIED TYPE: ICD-10-CM

## 2019-01-09 DIAGNOSIS — E86.0 DEHYDRATION: Primary | ICD-10-CM

## 2019-01-09 PROCEDURE — 3078F DIAST BP <80 MM HG: CPT | Mod: CPTII,S$GLB,, | Performed by: INTERNAL MEDICINE

## 2019-01-09 PROCEDURE — 63600175 PHARM REV CODE 636 W HCPCS: Mod: JG,PN | Performed by: INTERNAL MEDICINE

## 2019-01-09 PROCEDURE — 3078F PR MOST RECENT DIASTOLIC BLOOD PRESSURE < 80 MM HG: ICD-10-PCS | Mod: CPTII,S$GLB,, | Performed by: INTERNAL MEDICINE

## 2019-01-09 PROCEDURE — 99999 PR PBB SHADOW E&M-EST. PATIENT-LVL III: CPT | Mod: PBBFAC,,, | Performed by: INTERNAL MEDICINE

## 2019-01-09 PROCEDURE — 99999 PR PBB SHADOW E&M-EST. PATIENT-LVL III: ICD-10-PCS | Mod: PBBFAC,,, | Performed by: INTERNAL MEDICINE

## 2019-01-09 PROCEDURE — 1101F PT FALLS ASSESS-DOCD LE1/YR: CPT | Mod: CPTII,S$GLB,, | Performed by: INTERNAL MEDICINE

## 2019-01-09 PROCEDURE — 3074F SYST BP LT 130 MM HG: CPT | Mod: CPTII,S$GLB,, | Performed by: INTERNAL MEDICINE

## 2019-01-09 PROCEDURE — 99214 OFFICE O/P EST MOD 30 MIN: CPT | Mod: S$GLB,,, | Performed by: INTERNAL MEDICINE

## 2019-01-09 PROCEDURE — 99214 PR OFFICE/OUTPT VISIT, EST, LEVL IV, 30-39 MIN: ICD-10-PCS | Mod: S$GLB,,, | Performed by: INTERNAL MEDICINE

## 2019-01-09 PROCEDURE — 1101F PR PT FALLS ASSESS DOC 0-1 FALLS W/OUT INJ PAST YR: ICD-10-PCS | Mod: CPTII,S$GLB,, | Performed by: INTERNAL MEDICINE

## 2019-01-09 PROCEDURE — 96372 THER/PROPH/DIAG INJ SC/IM: CPT | Mod: PN

## 2019-01-09 PROCEDURE — 3074F PR MOST RECENT SYSTOLIC BLOOD PRESSURE < 130 MM HG: ICD-10-PCS | Mod: CPTII,S$GLB,, | Performed by: INTERNAL MEDICINE

## 2019-01-09 RX ADMIN — DENOSUMAB 120 MG: 120 INJECTION SUBCUTANEOUS at 10:01

## 2019-01-09 NOTE — PROGRESS NOTES
HISTORY OF PRESENT ILLNESS:  The patient is an 83-year-old white gentleman well   known to me for diagnosis of androgen-independent prostate carcinoma.  He had a   nice response to abiraterone/prednisone.  He discontinued this medication due to   difficulties with muscle wasting, fatigue and loss of equilibrium.  He   continues to struggle with equilibrium despite use of meclizine.  His PSA has   remained at castrate levels off therapy.  He receives quarterly Xgeva for   prevention of skeletal adverse event and is in the clinic for this today.  His   wife recently been hospitalized with pneumonia and he is fatigued from caring   from her.  No other pertinent findings on a 14-point review of systems.    PHYSICAL EXAMINATION:  GENERAL:  Well-developed, elderly, thin-appearing, white gentleman, with a   weight of 156.5 pounds (increased by 1 pound).  VITAL SIGNS:  Documented in EMR and reviewed.  HEENT:  Normocephalic, atraumatic.  Oral mucosa pink and moist.  Lips without   lesions.  Tongue midline.  Oropharynx clear.  Nonicteric sclerae.   NECK:  Supple, no adenopathy.  No carotid bruits, thyromegaly or thyroid nodule.  HEART:  Regular rate and rhythm without murmur, gallop or rub.                LUNGS:  Clear to auscultation bilaterally.  Normal respiratory effort.       ABDOMEN:  Soft, nontender, nondistended with positive normoactive bowel sounds,   no hepatosplenomegaly.    EXTREMITIES:  No cyanosis, clubbing or edema.  Distal pulses are intact.                                              AXILLAE AND GROIN:  No palpable pathologic lymphadenopathy is appreciated.        SKIN:  Intact/turgor normal.  NEUROLOGIC:  Cranial nerves II-XII grossly intact.  Motor:  Good muscle bulk and   tone.  Strength/sensory 5/5 throughout.    LABORATORY:  White count 4.5, H and H 8.2 and 26.3, platelet count 197.  Sodium   138, potassium 4.5, chloride 106, CO2 24, BUN 41, creatinine 1.3, glucose 107,   calcium 8.8.  Liver function  tests are within normal limits.  LDH is 167.    Today's PSA is pending.  Last PSA was stable at 0.22.    IMPRESSION:  1.  Androgen-independent prostate carcinoma involving bone.  2.  Normocytic anemia worse than last evaluation, but remains asymptomatic.    PLAN:  1.  Repeat Xgeva.  2.  Continue calcium supplementation with vitamin D.  3.  Continue to hold abiraterone/prednisone.  4.  Return to clinic three months from now with interval CBC, CMP, LDH, mag, and   PSA prior to appointment.      FADUMO/HN  dd: 01/09/2019 10:38:39 (CST)  td: 01/10/2019 02:16:41 (CST)  Doc ID   #[id number]  Job ID #[id number]    CC:

## 2019-02-15 RX ORDER — ISOSORBIDE MONONITRATE 60 MG/1
TABLET, EXTENDED RELEASE ORAL
Qty: 90 TABLET | Refills: 4 | Status: SHIPPED | OUTPATIENT
Start: 2019-02-15 | End: 2019-04-26 | Stop reason: SDUPTHER

## 2019-03-13 ENCOUNTER — OFFICE VISIT (OUTPATIENT)
Dept: UROLOGY | Facility: CLINIC | Age: 84
End: 2019-03-13
Payer: MEDICARE

## 2019-03-13 VITALS
BODY MASS INDEX: 22.12 KG/M2 | HEIGHT: 71 IN | SYSTOLIC BLOOD PRESSURE: 120 MMHG | DIASTOLIC BLOOD PRESSURE: 60 MMHG | HEART RATE: 88 BPM | WEIGHT: 158 LBS

## 2019-03-13 DIAGNOSIS — C61 MALIGNANT NEOPLASM OF PROSTATE: ICD-10-CM

## 2019-03-13 DIAGNOSIS — Z85.51 HX OF BLADDER CANCER: ICD-10-CM

## 2019-03-13 DIAGNOSIS — R35.0 INCREASED URINARY FREQUENCY: Primary | ICD-10-CM

## 2019-03-13 LAB
BILIRUB SERPL-MCNC: NORMAL MG/DL
BLOOD URINE, POC: NORMAL
COLOR, POC UA: NORMAL
GLUCOSE UR QL STRIP: NORMAL
KETONES UR QL STRIP: NORMAL
LEUKOCYTE ESTERASE URINE, POC: NORMAL
NITRITE, POC UA: NORMAL
PH, POC UA: 6
PROTEIN, POC: NORMAL
SPECIFIC GRAVITY, POC UA: 1.01
UROBILINOGEN, POC UA: 0.2

## 2019-03-13 PROCEDURE — 99215 OFFICE O/P EST HI 40 MIN: CPT | Mod: 25,S$GLB,, | Performed by: UROLOGY

## 2019-03-13 PROCEDURE — 81002 POCT URINE DIPSTICK WITHOUT MICROSCOPE: ICD-10-PCS | Mod: S$GLB,,, | Performed by: UROLOGY

## 2019-03-13 PROCEDURE — 3078F PR MOST RECENT DIASTOLIC BLOOD PRESSURE < 80 MM HG: ICD-10-PCS | Mod: CPTII,S$GLB,, | Performed by: UROLOGY

## 2019-03-13 PROCEDURE — 99999 PR PBB SHADOW E&M-EST. PATIENT-LVL III: ICD-10-PCS | Mod: PBBFAC,,, | Performed by: UROLOGY

## 2019-03-13 PROCEDURE — 1101F PR PT FALLS ASSESS DOC 0-1 FALLS W/OUT INJ PAST YR: ICD-10-PCS | Mod: CPTII,S$GLB,, | Performed by: UROLOGY

## 2019-03-13 PROCEDURE — 81002 URINALYSIS NONAUTO W/O SCOPE: CPT | Mod: S$GLB,,, | Performed by: UROLOGY

## 2019-03-13 PROCEDURE — 88112 CYTOPATH CELL ENHANCE TECH: CPT | Performed by: PATHOLOGY

## 2019-03-13 PROCEDURE — 99999 PR PBB SHADOW E&M-EST. PATIENT-LVL III: CPT | Mod: PBBFAC,,, | Performed by: UROLOGY

## 2019-03-13 PROCEDURE — 3078F DIAST BP <80 MM HG: CPT | Mod: CPTII,S$GLB,, | Performed by: UROLOGY

## 2019-03-13 PROCEDURE — 1101F PT FALLS ASSESS-DOCD LE1/YR: CPT | Mod: CPTII,S$GLB,, | Performed by: UROLOGY

## 2019-03-13 PROCEDURE — 3074F SYST BP LT 130 MM HG: CPT | Mod: CPTII,S$GLB,, | Performed by: UROLOGY

## 2019-03-13 PROCEDURE — 3074F PR MOST RECENT SYSTOLIC BLOOD PRESSURE < 130 MM HG: ICD-10-PCS | Mod: CPTII,S$GLB,, | Performed by: UROLOGY

## 2019-03-13 PROCEDURE — 99215 PR OFFICE/OUTPT VISIT, EST, LEVL V, 40-54 MIN: ICD-10-PCS | Mod: 25,S$GLB,, | Performed by: UROLOGY

## 2019-03-13 PROCEDURE — 88112 CYTOLOGY SPECIMEN-URINE: ICD-10-PCS | Mod: 26,,, | Performed by: PATHOLOGY

## 2019-03-13 PROCEDURE — 88112 CYTOPATH CELL ENHANCE TECH: CPT | Mod: 26,,, | Performed by: PATHOLOGY

## 2019-03-13 NOTE — PROGRESS NOTES
UROLOGY [place]  3 13 19    Cc urologic f/u    Age 83, says when he voids he has a brief burning sensation in the beginning, and then goes away. Stream is good, no intermittency or need to strain. Nocturia x 2, no abnormal daytime frequency.     psa 0.31 done two months ago.     Has hx of turbt of a 4 cm growth on the R lateral wall of the bladder in august 2017. 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 Pt did well after his turbt. His follow up cystoscopy again showed a recurrence, this time 3 x 3 cm in size in R lateral wall, and also a bladder stone 3 cm in size made up of soft material. The stone and the second tumor were removed in jan 2018. 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 Extensive dystrophic calcification noted within the muscular bladder wall.      In addition to the bladder cancer history, pt has a previous hx of high grade prostate cancer that has spread outside of the gland. Pt has done well with hormonal treatment for his prostate cancer, which he gets intermittently. He initially received 3 years of LHRH, for a total of 6 injections of trelstar 22.5 mg, given every 6 months. After that, he has been getting the hormonal treatment intermittently.      Has nocturia x 1-2, no significant daytime frequency, no urgency or incontinence. No pains or burning. Stream is slightly diminished but no need to strain.      His psa was 12 on presentation. It was 0.31 two months ago and 0.22 six months  ago     He has been bothered with constipation and fecal impaction, and has needed to take a number of medication for it, including magnesium citrate and miralax. He is well now from that.      [facility]     Surgical:  has a past surgical history that includes Appendectomy; Cholecystectomy; Cystoscopy; Vasectomy;  Coronary artery bypass graft (2001); Carotid stent; Coronary stent placement (last 2009); Cardiac surgery; Hernia repair; and Vascular surgery (2010).     Medical:  has a past medical history of Anemia; Arthritis; Cerebrovascular disease; Colon polyp; Complication of anesthesia; COPD (chronic obstructive pulmonary disease); Coronary artery disease; Diabetes mellitus; Elevated PSA; Equilibrium disorder; Hearing loss in left ear; Hyperlipidemia; Hypertension; Hypertrophy (benign) of prostate; Light smoker; Peripheral vascular disease; Peripheral vascular disease with claudication; Prostate cancer; Rectal prolapse; Rotator cuff tear; and Ulcer.     Familial: no fh of renal disease. Father had a stroke, mother had heart disease     Social: , lives in [place], [place]                 Current Outpatient Prescriptions on File Prior to Visit   Medication Sig Dispense Refill    abiraterone 250 mg Tab Take 4 tab 112 tablet 2    aspirin (ECOTRIN) 81 MG EC tablet Take 1 tablet by m        atorvastatin (LIPITOR) 40 MG tablet Take 1 tablet by mouth kayla        bismuth subsalicylate (PEPTO BISMOL) 262 mg/15 mL suspension Take 15 mLs by mouth every 6 (six) hours as nee        calcium carbonate-vit D3-min (CALCIUM-VITAMIN D) 600 mg  Take 1 tablet by mouth once daily.         clopidogrel (PLAVIX) 75 mg tablet Take 75 m   3    diphenoxylate-atropine 2.5-0.025 mg (LOMOTIL) 2.5-0.025 mg per TAKE 2 TABLETS BY MOUTH FOUR    2    fenofibrate (TRIGLIDE) 160 M Take 160 .          glimepiride (AMARYL) 2 MG tablet Take 2         hydrocodone-acetaminophen 5-325mg (NORCO) 5-325 m Take 1 tablet by mouth ever 40 tablet 0    isosorbide mononitrate (IMDUR) 60 MG 24 hr tablet TAKE 1 TABLET BY MOUTH DAILY 90 tablet 5    lisinopril (PRINIVIL,ZESTRIL) 2.5 MG tablet Take 2.5 mg by mouth once daily.          metoprolol (TOPROL XL) 50 MG 24 hr tablet Take 50 mg by mouth once daily.         Multi-Vitamin tablet Take 1 tab        nitroGLYCERIN  (NITROSTAT) 0.4 MG SL tablet Place 1 tablet (0.4 mg total) unde 30 tablet 12    omeprazole (PRILOSEC) 20 MG capsule Take 20 mg by mouth once daily.   3    ONE TOUCH ULTRA TEST Strp          oxybutynin (DITROPAN) 5 MG Tab TAKE 1 TABLET BY MOUTH E 30 tablet 11    predniSONE (DELTASONE) 10 MG tablet TAKE ONE TABL 30 tablet 0    ropinirole (REQUIP) 4 MG tablet Take ightly.   2    tamsulosin (FLOMAX) 0.4 mg Cp24 Take 1 capsule by mouth every         terazosin (HYTRIN) 1 MG capsule 1 capsule Twic        tramadol (ULTRAM) 50 mg tablet Take 50   0      Pt alert, oriented, no distress  HEENT: wnl.  Neck: supple, no JVD, no lymphadenopathy  Chest: CV NSR  Lungs: normal auscultation  Abdomen flat, nontender, no organomegaly, no masses.  No hernias  Penis nl, meatus nl  Testes nl, epi nl, scrotum nl  SEE: suggests he doesn't need it ('had enough')  Extremities: no edema, peripheral pulses nl  Neuro: preserved        IMP  Bladder ca, s/p resection x 2  Bladder stone, s/p removal 12 months ago  Chronic constipation, on proper treatment at this time  Hx of prostate ca, with involvement of all sextants, high grade, jenn 9 (5+4) in all of them, with a participation of between 90 and 100 percent of the tissue camples.   also showed innumerable bony sclerotic lesions on metastatic w/u and lymphadenopathy in the paraaortic nodes T3c N1 M1. Is on intermittent hormonal ablation, no need for injection at this time.   RTC for cysto next available appointment

## 2019-04-03 ENCOUNTER — PROCEDURE VISIT (OUTPATIENT)
Dept: UROLOGY | Facility: CLINIC | Age: 84
End: 2019-04-03
Payer: MEDICARE

## 2019-04-03 VITALS
DIASTOLIC BLOOD PRESSURE: 56 MMHG | SYSTOLIC BLOOD PRESSURE: 92 MMHG | HEART RATE: 81 BPM | WEIGHT: 158.94 LBS | HEIGHT: 71 IN | BODY MASS INDEX: 22.25 KG/M2

## 2019-04-03 DIAGNOSIS — Z85.51 HX OF BLADDER CANCER: ICD-10-CM

## 2019-04-03 DIAGNOSIS — C67.2 MALIGNANT NEOPLASM OF LATERAL WALL OF URINARY BLADDER: ICD-10-CM

## 2019-04-03 LAB
BILIRUB SERPL-MCNC: NORMAL MG/DL
BLOOD URINE, POC: NORMAL
COLOR, POC UA: YELLOW
GLUCOSE UR QL STRIP: NORMAL
KETONES UR QL STRIP: NORMAL
LEUKOCYTE ESTERASE URINE, POC: NORMAL
NITRITE, POC UA: NORMAL
PH, POC UA: 5.5
PROTEIN, POC: NORMAL
SPECIFIC GRAVITY, POC UA: 1.02
UROBILINOGEN, POC UA: NORMAL

## 2019-04-03 PROCEDURE — 52000 PR CYSTOURETHROSCOPY: ICD-10-PCS | Mod: S$GLB,,, | Performed by: UROLOGY

## 2019-04-03 PROCEDURE — 81002 POCT URINE DIPSTICK WITHOUT MICROSCOPE: ICD-10-PCS | Mod: S$GLB,,, | Performed by: UROLOGY

## 2019-04-03 PROCEDURE — 52000 CYSTOURETHROSCOPY: CPT | Mod: S$GLB,,, | Performed by: UROLOGY

## 2019-04-03 PROCEDURE — 81002 URINALYSIS NONAUTO W/O SCOPE: CPT | Mod: S$GLB,,, | Performed by: UROLOGY

## 2019-04-03 RX ORDER — TAMSULOSIN HYDROCHLORIDE 0.4 MG/1
1 CAPSULE ORAL NIGHTLY
Refills: 4 | COMMUNITY
Start: 2019-03-20

## 2019-04-03 RX ORDER — ACETAMINOPHEN, CAFFEINE, DIHYDROCODEINE BITARTRATE 320.5; 30; 16 MG/1; MG/1; MG/1
CAPSULE ORAL
Refills: 0 | COMMUNITY
Start: 2019-04-01 | End: 2019-11-01

## 2019-04-03 RX ORDER — CEPHALEXIN 250 MG/1
CAPSULE ORAL
Refills: 0 | COMMUNITY
Start: 2019-03-29 | End: 2019-04-26

## 2019-04-04 NOTE — PROGRESS NOTES
UROLOGY [place]  4 3 19    Cc bladder ca follow up    Age 83, here for cysto.    CYSTOSCOPY olympus flexible. Anterior urethra normal. Posterior urethra 4 cm, with bilobar hypertrophy. Bladder cavity distends equally and symmetrically when filled with water. On the R lateral wall there is a 3 cm area with irregular mucosa that is suspicious for a possible recurrence. There is also a diverticulum that is not involved in the recurrence. No other suspicious lesions. Trigone normal. Pt tolerated the procedure well.      IMP  Bladder ca, possible recurrence today  Will do cystoscopy, bilat retro, turbt R lat wall, 3 cm

## 2019-04-09 ENCOUNTER — OFFICE VISIT (OUTPATIENT)
Dept: HEMATOLOGY/ONCOLOGY | Facility: CLINIC | Age: 84
End: 2019-04-09
Payer: MEDICARE

## 2019-04-09 ENCOUNTER — INFUSION (OUTPATIENT)
Dept: INFUSION THERAPY | Facility: HOSPITAL | Age: 84
End: 2019-04-09
Attending: INTERNAL MEDICINE
Payer: MEDICARE

## 2019-04-09 VITALS
RESPIRATION RATE: 18 BRPM | HEART RATE: 84 BPM | DIASTOLIC BLOOD PRESSURE: 47 MMHG | SYSTOLIC BLOOD PRESSURE: 87 MMHG | TEMPERATURE: 98 F

## 2019-04-09 VITALS
DIASTOLIC BLOOD PRESSURE: 47 MMHG | HEART RATE: 84 BPM | RESPIRATION RATE: 18 BRPM | WEIGHT: 158.31 LBS | BODY MASS INDEX: 22.16 KG/M2 | HEIGHT: 71 IN | TEMPERATURE: 98 F | SYSTOLIC BLOOD PRESSURE: 87 MMHG

## 2019-04-09 DIAGNOSIS — C61 PROSTATE CANCER: Primary | ICD-10-CM

## 2019-04-09 DIAGNOSIS — C61 PROSTATE CANCER: ICD-10-CM

## 2019-04-09 DIAGNOSIS — D63.0 ANEMIA IN NEOPLASTIC DISEASE: ICD-10-CM

## 2019-04-09 DIAGNOSIS — C79.51 BONE METASTASES: ICD-10-CM

## 2019-04-09 DIAGNOSIS — E86.0 DEHYDRATION: Primary | ICD-10-CM

## 2019-04-09 DIAGNOSIS — R19.7 DIARRHEA, UNSPECIFIED TYPE: ICD-10-CM

## 2019-04-09 DIAGNOSIS — N18.30 CKD (CHRONIC KIDNEY DISEASE), STAGE III: ICD-10-CM

## 2019-04-09 PROBLEM — D64.9 ABSOLUTE ANEMIA: Status: ACTIVE | Noted: 2019-04-09

## 2019-04-09 PROCEDURE — 1101F PT FALLS ASSESS-DOCD LE1/YR: CPT | Mod: CPTII,S$GLB,, | Performed by: INTERNAL MEDICINE

## 2019-04-09 PROCEDURE — 3078F PR MOST RECENT DIASTOLIC BLOOD PRESSURE < 80 MM HG: ICD-10-PCS | Mod: CPTII,S$GLB,, | Performed by: INTERNAL MEDICINE

## 2019-04-09 PROCEDURE — 99499 UNLISTED E&M SERVICE: CPT | Mod: S$GLB,,, | Performed by: INTERNAL MEDICINE

## 2019-04-09 PROCEDURE — 99999 PR PBB SHADOW E&M-EST. PATIENT-LVL III: ICD-10-PCS | Mod: PBBFAC,,, | Performed by: INTERNAL MEDICINE

## 2019-04-09 PROCEDURE — 96372 THER/PROPH/DIAG INJ SC/IM: CPT | Mod: PN

## 2019-04-09 PROCEDURE — 99215 OFFICE O/P EST HI 40 MIN: CPT | Mod: S$GLB,,, | Performed by: INTERNAL MEDICINE

## 2019-04-09 PROCEDURE — 99999 PR PBB SHADOW E&M-EST. PATIENT-LVL III: CPT | Mod: PBBFAC,,, | Performed by: INTERNAL MEDICINE

## 2019-04-09 PROCEDURE — 3074F PR MOST RECENT SYSTOLIC BLOOD PRESSURE < 130 MM HG: ICD-10-PCS | Mod: CPTII,S$GLB,, | Performed by: INTERNAL MEDICINE

## 2019-04-09 PROCEDURE — 3074F SYST BP LT 130 MM HG: CPT | Mod: CPTII,S$GLB,, | Performed by: INTERNAL MEDICINE

## 2019-04-09 PROCEDURE — 99215 PR OFFICE/OUTPT VISIT, EST, LEVL V, 40-54 MIN: ICD-10-PCS | Mod: S$GLB,,, | Performed by: INTERNAL MEDICINE

## 2019-04-09 PROCEDURE — 99499 RISK ADDL DX/OHS AUDIT: ICD-10-PCS | Mod: S$GLB,,, | Performed by: INTERNAL MEDICINE

## 2019-04-09 PROCEDURE — 3078F DIAST BP <80 MM HG: CPT | Mod: CPTII,S$GLB,, | Performed by: INTERNAL MEDICINE

## 2019-04-09 PROCEDURE — 1101F PR PT FALLS ASSESS DOC 0-1 FALLS W/OUT INJ PAST YR: ICD-10-PCS | Mod: CPTII,S$GLB,, | Performed by: INTERNAL MEDICINE

## 2019-04-09 PROCEDURE — 63600175 PHARM REV CODE 636 W HCPCS: Mod: JG,PN | Performed by: INTERNAL MEDICINE

## 2019-04-09 RX ORDER — OXYBUTYNIN CHLORIDE 5 MG/1
5 TABLET ORAL NIGHTLY
COMMUNITY
Start: 2019-04-05

## 2019-04-09 RX ADMIN — DENOSUMAB 120 MG: 120 INJECTION SUBCUTANEOUS at 12:04

## 2019-04-09 NOTE — PROGRESS NOTES
History of present illness:  The patient is an 83-year-old white gentleman well known to me for androgen independent prostate carcinoma.  Patient had excellent response to abiraterone/prednisone.  He discontinue this medication due to difficulties with muscle wasting fatigue and loss of equilibrium.  His PSA levels have remained low, and the patient has fared well clinically.  Of late, he has been having difficulty with low back pain and radiculopathy.  He has been scheduled for but not yet undergone epidural steroid injection.  Patient is experiencing increasing fatigue.  He denies any signs/symptoms of GI blood loss. He returns for quarterly administration of Xgeva for prevention of skeletal adverse event.  No other pertinent findings on a 14 point review of systems.    Physical examination:  Well-developed, thin appearing, elderly, white male with a weight of 158.5 lb (increased by 2 lb).  VITAL SIGNS: Documented  and reviewed this visit.  HEENT: Normocephalic, atraumatic. Oral mucosa pink and moist. Lips without   lesions. Tongue midline. Oropharynx clear. Nonicteric sclerae.   NECK: Supple, no adenopathy. No carotid bruits, thyromegaly or thyroid nodule.   HEART: Regular rate and rhythm without murmur, gallop or rub.   LUNGS: Clear to auscultation bilaterally. Normal respiratory effort.   ABDOMEN: Soft, nontender, nondistended with positive normoactive bowel sounds,   no hepatosplenomegaly.   EXTREMITIES: No cyanosis, clubbing or edema. Distal pulses are intact.   AXILLAE AND GROIN: No palpable pathologic lymphadenopathy is appreciated.   SKIN: Intact/turgor normal   NEUROLOGIC: Cranial nerves II-XII grossly intact. Motor: Good muscle bulk and   tone. Strength/sensory 5/5 throughout. Gait stable.     Laboratory:  White count 5.5, hemoglobin 8.2, hematocrit 26.4, MCV 90, RDW 19.7, platelets 144, absolute neutrophil count 4300.  Sodium 137, potassium 4.6, chloride 108, CO2 24, BUN 42, creatinine 1.3, glucose 163,  calcium 9.1, magnesium 1.9, liver function test within normal limits, , GFR 51.  PSA marginally increased in 0.58.    Impression:  1.  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  2.  Worsening normocytic anemia (2 g fall in hemoglobin since office evaluation 3 months ago).  3.  Stage 3 chronic kidney disease.    Plan:  1.  Remain off abiraterone and prednisone at this time.  2.  Continue calcium supplementation with vitamin-D.  3.  Repeat Xgeva 120 mg subcu  4.  Obtain iron studies and stools for occult blood.  5.  Review results of iron studies and stool cards by phone.  6.  Otherwise, return to clinic 3 months from now with interval CBC, CMP, LDH, magnesium, and PSA prior to appointment.

## 2019-04-09 NOTE — PROGRESS NOTES
Patient, Cameron Bridges (MRN #[id number]), presented with a recent Platelet count less than 150 K/uL consistent with the definition of thrombocytopenia (ICD10 - D69.6).    Platelets   Date Value Ref Range Status   04/09/2019 144 (L) 150 - 350 K/uL Final     The patient's thrombocytopenia was monitored, evaluated, addressed and/or treated. This addendum to the medical record is made on 04/09/2019.

## 2019-04-10 ENCOUNTER — DOCUMENTATION ONLY (OUTPATIENT)
Dept: INFUSION THERAPY | Facility: HOSPITAL | Age: 84
End: 2019-04-10

## 2019-04-10 RX ORDER — SODIUM CHLORIDE 0.9 % (FLUSH) 0.9 %
10 SYRINGE (ML) INJECTION
Status: CANCELLED | OUTPATIENT
Start: 2019-04-26

## 2019-04-10 RX ORDER — FAMOTIDINE 20 MG/50ML
20 INJECTION, SOLUTION INTRAVENOUS
Status: CANCELLED
Start: 2019-04-26

## 2019-04-10 RX ORDER — HEPARIN 100 UNIT/ML
500 SYRINGE INTRAVENOUS
Status: CANCELLED | OUTPATIENT
Start: 2019-04-26

## 2019-04-18 ENCOUNTER — TELEPHONE (OUTPATIENT)
Dept: CARDIOLOGY | Facility: CLINIC | Age: 84
End: 2019-04-18

## 2019-04-18 NOTE — TELEPHONE ENCOUNTER
----- Message from Jovana Shah sent at 4/18/2019 10:09 AM CDT -----  Contact: Chito song/Dr Conrado Moon [phone]  She is calling to check the status of the release form that was faxed to you on 3/29.  She will refax it today.  Thank you!

## 2019-04-23 ENCOUNTER — TELEPHONE (OUTPATIENT)
Dept: CARDIOLOGY | Facility: CLINIC | Age: 84
End: 2019-04-23

## 2019-04-23 NOTE — TELEPHONE ENCOUNTER
----- Message from Parker Figueroa sent at 4/23/2019  9:47 AM CDT -----  Contact: pt  Type: Needs Medical Advice    Who Called:  pt    Best Call Back Number: [phone]  Additional Information: pt states he is having a procedure scheduled with Dr Fisher and is needing a release for this. Please call to advise.    Dr Moon phone: [phone]

## 2019-04-26 ENCOUNTER — OFFICE VISIT (OUTPATIENT)
Dept: CARDIOLOGY | Facility: CLINIC | Age: 84
End: 2019-04-26
Payer: MEDICARE

## 2019-04-26 ENCOUNTER — INFUSION (OUTPATIENT)
Dept: INFUSION THERAPY | Facility: HOSPITAL | Age: 84
End: 2019-04-26
Attending: INTERNAL MEDICINE
Payer: MEDICARE

## 2019-04-26 VITALS
RESPIRATION RATE: 16 BRPM | SYSTOLIC BLOOD PRESSURE: 140 MMHG | HEART RATE: 64 BPM | OXYGEN SATURATION: 95 % | HEIGHT: 71 IN | WEIGHT: 158.75 LBS | DIASTOLIC BLOOD PRESSURE: 66 MMHG | BODY MASS INDEX: 22.23 KG/M2 | TEMPERATURE: 98 F

## 2019-04-26 VITALS
DIASTOLIC BLOOD PRESSURE: 64 MMHG | HEART RATE: 80 BPM | HEIGHT: 71 IN | WEIGHT: 159.81 LBS | SYSTOLIC BLOOD PRESSURE: 112 MMHG | BODY MASS INDEX: 22.37 KG/M2

## 2019-04-26 DIAGNOSIS — Z98.890 HISTORY OF CAROTID ENDARTERECTOMY: ICD-10-CM

## 2019-04-26 DIAGNOSIS — I70.213 ATHEROSCLEROSIS OF NATIVE ARTERY OF BOTH LOWER EXTREMITIES WITH INTERMITTENT CLAUDICATION: ICD-10-CM

## 2019-04-26 DIAGNOSIS — Z01.810 PREOP CARDIOVASCULAR EXAM: Primary | ICD-10-CM

## 2019-04-26 DIAGNOSIS — I25.10 CORONARY ARTERY DISEASE INVOLVING NATIVE CORONARY ARTERY OF NATIVE HEART WITHOUT ANGINA PECTORIS: ICD-10-CM

## 2019-04-26 DIAGNOSIS — D50.9 IRON DEFICIENCY ANEMIA, UNSPECIFIED IRON DEFICIENCY ANEMIA TYPE: Primary | ICD-10-CM

## 2019-04-26 DIAGNOSIS — J44.9 CHRONIC OBSTRUCTIVE PULMONARY DISEASE, UNSPECIFIED COPD TYPE: ICD-10-CM

## 2019-04-26 DIAGNOSIS — Z95.1 S/P CABG (CORONARY ARTERY BYPASS GRAFT): ICD-10-CM

## 2019-04-26 DIAGNOSIS — F17.210 CIGARETTE SMOKER: ICD-10-CM

## 2019-04-26 DIAGNOSIS — Z95.5 STATUS POST CORONARY ARTERY STENT PLACEMENT: Chronic | ICD-10-CM

## 2019-04-26 DIAGNOSIS — Z01.810 PREOP CARDIOVASCULAR EXAM: ICD-10-CM

## 2019-04-26 DIAGNOSIS — C79.51 BONY METASTASIS: ICD-10-CM

## 2019-04-26 PROCEDURE — 3288F FALL RISK ASSESSMENT DOCD: CPT | Mod: CPTII,S$GLB,, | Performed by: INTERNAL MEDICINE

## 2019-04-26 PROCEDURE — 3074F SYST BP LT 130 MM HG: CPT | Mod: CPTII,S$GLB,, | Performed by: INTERNAL MEDICINE

## 2019-04-26 PROCEDURE — 99214 PR OFFICE/OUTPT VISIT, EST, LEVL IV, 30-39 MIN: ICD-10-PCS | Mod: S$GLB,,, | Performed by: INTERNAL MEDICINE

## 2019-04-26 PROCEDURE — 99999 PR PBB SHADOW E&M-EST. PATIENT-LVL III: ICD-10-PCS | Mod: PBBFAC,,, | Performed by: INTERNAL MEDICINE

## 2019-04-26 PROCEDURE — 96367 TX/PROPH/DG ADDL SEQ IV INF: CPT | Mod: PN

## 2019-04-26 PROCEDURE — 3078F PR MOST RECENT DIASTOLIC BLOOD PRESSURE < 80 MM HG: ICD-10-PCS | Mod: CPTII,S$GLB,, | Performed by: INTERNAL MEDICINE

## 2019-04-26 PROCEDURE — 93000 EKG 12-LEAD: ICD-10-PCS | Mod: S$GLB,,, | Performed by: INTERNAL MEDICINE

## 2019-04-26 PROCEDURE — 3078F DIAST BP <80 MM HG: CPT | Mod: CPTII,S$GLB,, | Performed by: INTERNAL MEDICINE

## 2019-04-26 PROCEDURE — 99214 OFFICE O/P EST MOD 30 MIN: CPT | Mod: S$GLB,,, | Performed by: INTERNAL MEDICINE

## 2019-04-26 PROCEDURE — 25000003 PHARM REV CODE 250: Mod: PN | Performed by: INTERNAL MEDICINE

## 2019-04-26 PROCEDURE — A4216 STERILE WATER/SALINE, 10 ML: HCPCS | Mod: PN | Performed by: INTERNAL MEDICINE

## 2019-04-26 PROCEDURE — 1100F PR PT FALLS ASSESS DOC 2+ FALLS/FALL W/INJURY/YR: ICD-10-PCS | Mod: CPTII,S$GLB,, | Performed by: INTERNAL MEDICINE

## 2019-04-26 PROCEDURE — 96365 THER/PROPH/DIAG IV INF INIT: CPT | Mod: PN

## 2019-04-26 PROCEDURE — 3288F PR FALLS RISK ASSESSMENT DOCUMENTED: ICD-10-PCS | Mod: CPTII,S$GLB,, | Performed by: INTERNAL MEDICINE

## 2019-04-26 PROCEDURE — 99999 PR PBB SHADOW E&M-EST. PATIENT-LVL III: CPT | Mod: PBBFAC,,, | Performed by: INTERNAL MEDICINE

## 2019-04-26 PROCEDURE — 63600175 PHARM REV CODE 636 W HCPCS: Mod: JG,PN | Performed by: INTERNAL MEDICINE

## 2019-04-26 PROCEDURE — 1100F PTFALLS ASSESS-DOCD GE2>/YR: CPT | Mod: CPTII,S$GLB,, | Performed by: INTERNAL MEDICINE

## 2019-04-26 PROCEDURE — S0028 INJECTION, FAMOTIDINE, 20 MG: HCPCS | Mod: PN | Performed by: INTERNAL MEDICINE

## 2019-04-26 PROCEDURE — 3074F PR MOST RECENT SYSTOLIC BLOOD PRESSURE < 130 MM HG: ICD-10-PCS | Mod: CPTII,S$GLB,, | Performed by: INTERNAL MEDICINE

## 2019-04-26 PROCEDURE — 96375 TX/PRO/DX INJ NEW DRUG ADDON: CPT | Mod: PN

## 2019-04-26 PROCEDURE — 93000 ELECTROCARDIOGRAM COMPLETE: CPT | Mod: S$GLB,,, | Performed by: INTERNAL MEDICINE

## 2019-04-26 RX ORDER — FAMOTIDINE 10 MG/ML
20 INJECTION INTRAVENOUS
Status: COMPLETED | OUTPATIENT
Start: 2019-04-26 | End: 2019-04-26

## 2019-04-26 RX ORDER — FAMOTIDINE 10 MG/ML
20 INJECTION INTRAVENOUS
Status: CANCELLED
Start: 2019-04-29

## 2019-04-26 RX ORDER — HEPARIN 100 UNIT/ML
500 SYRINGE INTRAVENOUS
Status: CANCELLED | OUTPATIENT
Start: 2019-04-29

## 2019-04-26 RX ORDER — SODIUM CHLORIDE 0.9 % (FLUSH) 0.9 %
10 SYRINGE (ML) INJECTION
Status: DISCONTINUED | OUTPATIENT
Start: 2019-04-26 | End: 2019-04-26 | Stop reason: HOSPADM

## 2019-04-26 RX ORDER — DEXAMETHASONE SODIUM PHOSPHATE 4 MG/ML
4 INJECTION, SOLUTION INTRA-ARTICULAR; INTRALESIONAL; INTRAMUSCULAR; INTRAVENOUS; SOFT TISSUE
Status: CANCELLED
Start: 2019-04-29

## 2019-04-26 RX ORDER — DEXAMETHASONE SODIUM PHOSPHATE 4 MG/ML
4 INJECTION, SOLUTION INTRA-ARTICULAR; INTRALESIONAL; INTRAMUSCULAR; INTRAVENOUS; SOFT TISSUE
Status: COMPLETED | OUTPATIENT
Start: 2019-04-26 | End: 2019-04-26

## 2019-04-26 RX ORDER — SODIUM CHLORIDE 0.9 % (FLUSH) 0.9 %
10 SYRINGE (ML) INJECTION
Status: CANCELLED | OUTPATIENT
Start: 2019-04-29

## 2019-04-26 RX ADMIN — FERUMOXYTOL 510 MG: 510 INJECTION INTRAVENOUS at 12:04

## 2019-04-26 RX ADMIN — FAMOTIDINE 20 MG: 10 INJECTION, SOLUTION INTRAVENOUS at 10:04

## 2019-04-26 RX ADMIN — SODIUM CHLORIDE: 9 INJECTION, SOLUTION INTRAVENOUS at 10:04

## 2019-04-26 RX ADMIN — DEXAMETHASONE SODIUM PHOSPHATE 4 MG: 4 INJECTION, SOLUTION INTRA-ARTICULAR; INTRALESIONAL; INTRAMUSCULAR; INTRAVENOUS; SOFT TISSUE at 10:04

## 2019-04-26 RX ADMIN — Medication 10 ML: at 12:04

## 2019-04-26 RX ADMIN — DIPHENHYDRAMINE HYDROCHLORIDE 25 MG: 50 INJECTION, SOLUTION INTRAMUSCULAR; INTRAVENOUS at 10:04

## 2019-04-26 RX ADMIN — Medication 10 ML: at 10:04

## 2019-04-26 NOTE — NURSING
[4/26/2019 10:26 AM]    Zulma Bridges [id number], is here getting faraheme day 1 he has 50 benadryl in his plan, can you see if we can give him 25 instead of the 50 he is here by himself and has to drive to another MD apt when he is done here   he has received iron in the past and did not have any issues     [4/26/2019 10:27 AM]  Kimberly Carreno:    B is in a room, I will ask. give me a few minutes     [4/26/2019 10:27 AM]    k thanks     [4/26/2019 10:29 AM]  Kimberly Carreno:    ok for 25

## 2019-04-26 NOTE — PLAN OF CARE
Problem: Adult Inpatient Plan of Care  Goal: Plan of Care Review  Outcome: Ongoing (interventions implemented as appropriate)  Adequate for discharge.   Pt tolerated Ferraheme infusion without noted distress.  Reviewed upcoming appointments.  All questions answered. Advised to call MD with any questions or concerns.   Ambulated from infusion center independently by self.

## 2019-04-26 NOTE — PROGRESS NOTES
Subjective:    Patient ID:  Cameron Bridges is a 83 y.o. male who presents for follow-up of Coronary Artery Disease (cardiac clearance back spacer placed Dr. Hope); COPD; Hypertension; Hyperlipidemia; and Diabetes      Pt here for f/u and pre-op for back procedure. He was last here in 2017. Since then he has had cancer treatments, gets iron infusions and has ongoing back problems. He reports his heart has been the one thing not giving him problems. He rarely uses NTG and has been doing fine from cardiac point.       Review of Systems   Constitution: Negative for weight gain and weight loss.   HENT: Negative.    Eyes: Negative.    Cardiovascular: Positive for dyspnea on exertion. Negative for chest pain, claudication, cyanosis, irregular heartbeat, leg swelling, near-syncope, orthopnea (no PND), palpitations and syncope.   Respiratory: Positive for shortness of breath. Negative for cough, hemoptysis and snoring.    Endocrine: Negative.    Skin: Negative.    Musculoskeletal: Positive for arthritis and back pain. Negative for joint pain, muscle cramps, muscle weakness and myalgias.   Gastrointestinal: Negative for diarrhea, hematemesis, nausea and vomiting.   Genitourinary: Negative.    Neurological: Negative for dizziness, focal weakness, light-headedness, loss of balance, numbness, paresthesias and seizures.   Psychiatric/Behavioral: Negative.         Objective:    Physical Exam   Constitutional: He is oriented to person, place, and time. He appears well-developed and well-nourished.   HENT:   Mouth/Throat: Oropharynx is clear and moist.   Eyes: Pupils are equal, round, and reactive to light.   Neck: Normal range of motion. No thyromegaly present.   Cardiovascular: Normal rate, regular rhythm, S1 normal, S2 normal, normal heart sounds, intact distal pulses and normal pulses.  No extrasystoles are present. PMI is not displaced. Exam reveals no friction rub.   No murmur heard.  Pulmonary/Chest: Effort normal and  breath sounds normal. He has no wheezes. He has no rales. He exhibits no tenderness.   Abdominal: Soft. Bowel sounds are normal. He exhibits no distension and no mass. There is no tenderness.   Musculoskeletal: Normal range of motion. He exhibits no edema.   Neurological: He is alert and oriented to person, place, and time.   Skin: Skin is warm and dry.   Vitals reviewed.      Test(s) Reviewed  I have reviewed the following in detail:  [] Stress test   [] Angiography   [] Echocardiogram   [] Labs   [x] Other:  ECG       Assessment:       1. Coronary artery disease involving native coronary artery of native heart without angina pectoris    2. Preop cardiovascular exam    3. Atherosclerosis of native artery of both lower extremities with intermittent claudication    4. Chronic obstructive pulmonary disease, unspecified COPD type    5. S/P CABG (coronary artery bypass graft) - x4 2000; LIMA to LAD (patent 2009); SVG to Ramus (occluded 2009); SVG to OMB (stent 2009); SVG to PDA (occluded 2009)    6. Status post coronary artery stent placement - rotablator, stent to [facility] 01/06/2015    7. History of carotid endarterectomy    8. Cigarette smoker         Plan:       Pt with chronic stable angina, no new ECG changes  No absolute contraindication to planned back procedure. Continue all meds paola-op.   May hold plavix as needed for procedure.  Would prefer to continue low dose asa if able; if not, restart when felt safe.  We discussed more frequent cardiology f/u  F/u 6 months

## 2019-04-29 ENCOUNTER — INFUSION (OUTPATIENT)
Dept: INFUSION THERAPY | Facility: HOSPITAL | Age: 84
End: 2019-04-29
Attending: INTERNAL MEDICINE
Payer: MEDICARE

## 2019-04-29 ENCOUNTER — TELEPHONE (OUTPATIENT)
Dept: CARDIOLOGY | Facility: CLINIC | Age: 84
End: 2019-04-29

## 2019-04-29 VITALS
WEIGHT: 158.75 LBS | DIASTOLIC BLOOD PRESSURE: 55 MMHG | HEART RATE: 74 BPM | TEMPERATURE: 99 F | RESPIRATION RATE: 16 BRPM | SYSTOLIC BLOOD PRESSURE: 111 MMHG | OXYGEN SATURATION: 95 % | BODY MASS INDEX: 22.23 KG/M2 | HEIGHT: 71 IN

## 2019-04-29 DIAGNOSIS — C79.51 BONY METASTASIS: ICD-10-CM

## 2019-04-29 DIAGNOSIS — D50.9 IRON DEFICIENCY ANEMIA, UNSPECIFIED IRON DEFICIENCY ANEMIA TYPE: Primary | ICD-10-CM

## 2019-04-29 PROCEDURE — 25000003 PHARM REV CODE 250: Mod: PN | Performed by: INTERNAL MEDICINE

## 2019-04-29 PROCEDURE — 96365 THER/PROPH/DIAG IV INF INIT: CPT | Mod: PN

## 2019-04-29 PROCEDURE — 63600175 PHARM REV CODE 636 W HCPCS: Mod: PN | Performed by: INTERNAL MEDICINE

## 2019-04-29 PROCEDURE — S0028 INJECTION, FAMOTIDINE, 20 MG: HCPCS | Mod: PN | Performed by: INTERNAL MEDICINE

## 2019-04-29 PROCEDURE — 96376 TX/PRO/DX INJ SAME DRUG ADON: CPT | Mod: PN

## 2019-04-29 PROCEDURE — 96367 TX/PROPH/DG ADDL SEQ IV INF: CPT | Mod: PN

## 2019-04-29 RX ORDER — FAMOTIDINE 10 MG/ML
20 INJECTION INTRAVENOUS
Status: CANCELLED
Start: 2019-04-29

## 2019-04-29 RX ORDER — FAMOTIDINE 10 MG/ML
20 INJECTION INTRAVENOUS
Status: COMPLETED | OUTPATIENT
Start: 2019-04-29 | End: 2019-04-29

## 2019-04-29 RX ORDER — DEXAMETHASONE SODIUM PHOSPHATE 4 MG/ML
4 INJECTION, SOLUTION INTRA-ARTICULAR; INTRALESIONAL; INTRAMUSCULAR; INTRAVENOUS; SOFT TISSUE
Status: COMPLETED | OUTPATIENT
Start: 2019-04-29 | End: 2019-04-29

## 2019-04-29 RX ORDER — HEPARIN 100 UNIT/ML
500 SYRINGE INTRAVENOUS
Status: CANCELLED | OUTPATIENT
Start: 2019-04-29

## 2019-04-29 RX ORDER — DEXAMETHASONE SODIUM PHOSPHATE 4 MG/ML
4 INJECTION, SOLUTION INTRA-ARTICULAR; INTRALESIONAL; INTRAMUSCULAR; INTRAVENOUS; SOFT TISSUE
Status: CANCELLED
Start: 2019-04-29

## 2019-04-29 RX ADMIN — FAMOTIDINE 20 MG: 10 INJECTION, SOLUTION INTRAVENOUS at 02:04

## 2019-04-29 RX ADMIN — DIPHENHYDRAMINE HYDROCHLORIDE 25 MG: 50 INJECTION, SOLUTION INTRAMUSCULAR; INTRAVENOUS at 01:04

## 2019-04-29 RX ADMIN — FERUMOXYTOL 510 MG: 510 INJECTION INTRAVENOUS at 02:04

## 2019-04-29 RX ADMIN — DEXAMETHASONE SODIUM PHOSPHATE 4 MG: 4 INJECTION, SOLUTION INTRA-ARTICULAR; INTRALESIONAL; INTRAMUSCULAR; INTRAVENOUS; SOFT TISSUE at 02:04

## 2019-04-29 RX ADMIN — SODIUM CHLORIDE: 9 INJECTION, SOLUTION INTRAVENOUS at 01:04

## 2019-04-29 NOTE — TELEPHONE ENCOUNTER
----- Message from Renata Donovan sent at 4/29/2019  9:37 AM CDT -----  Contact: Chito song/ Dr. Conrado Moon office  Type: Needs Medical Advice    Who Called:  Chito song/ Dr. Moon office  Best Call Back Number: [phone]  Additional Information: patient needs to be scheduled for a back procedure as soon as possible but they need clearance before they do so--patient was seen on 4/26 by Dr. Servin--please fax the clearance to [phone] & [phone] (needs to be faxed to both numbers)--Attn: Chito--please contact office when fax has been sent so they can start looking for the fax & they can contact the patient to schedule procedure--please advise--thank you

## 2019-04-29 NOTE — PLAN OF CARE
Problem: Adult Inpatient Plan of Care  Goal: Plan of Care Review  Outcome: Ongoing (interventions implemented as appropriate)  Pt tolerated tx well. No signs of infusion reaction noted. Pt held post infusion per protocol.   AVS given to pt Pt educated to call Dr with any issues. Pt verbalized understanding. Pt adequate for discharge.

## 2019-04-29 NOTE — PLAN OF CARE
Problem: Anemia  Goal: Anemia Symptom Improvement    Intervention: Monitor and Manage Anemia  Pt receiving faraheme on shift no complaints

## 2019-06-03 ENCOUNTER — TELEPHONE (OUTPATIENT)
Dept: HEMATOLOGY/ONCOLOGY | Facility: CLINIC | Age: 84
End: 2019-06-03

## 2019-06-03 NOTE — TELEPHONE ENCOUNTER
----- Message from Matilda Anderson sent at 6/3/2019  9:57 AM CDT -----  Type: Needs Medical Advice    Who Called:  Tamika with [facility]    Best Call Back Number: [phone]  Additional Information: would like to go over patients chart and information and a change in his condition , patient had some 3 falls in past week and is concerned would like to speak with Dr regarding his condition please leave voice mail for a direct line to call back

## 2019-06-03 NOTE — TELEPHONE ENCOUNTER
Tamika is a NP, contracted through [employer]), the pt's insurance.  They do home visits and report concerns to pt providers.    Concerned about pt and frequent falls.  Pt has fallen 3 times in 1 week.  Pt has multiple bruises and abrasions on upper body.  C/O severe pain in back, arms, and legs.    Pt ambulates unsteadily w walker.  Wife is in motorized w/c.  Np requesting recent visit notes from Dr. Fernandez.  Informed they'd have to send release.  Tamika wants to know if it's time for hospice, but pt is stating he wants to be a full code.  Pt continues to smoke as well.  Tamika wants pt brought in sooner than 7/9/19. Wants pain addressed.

## 2019-06-04 NOTE — TELEPHONE ENCOUNTER
Spoke w pt's wife, appt scheduled 6/5/19.  Pt's wife verbalized understanding and appreciation of appt dates and times.

## 2019-06-05 ENCOUNTER — LAB VISIT (OUTPATIENT)
Dept: LAB | Facility: HOSPITAL | Age: 84
End: 2019-06-05
Attending: INTERNAL MEDICINE
Payer: MEDICARE

## 2019-06-05 ENCOUNTER — OFFICE VISIT (OUTPATIENT)
Dept: HEMATOLOGY/ONCOLOGY | Facility: CLINIC | Age: 84
End: 2019-06-05
Payer: MEDICARE

## 2019-06-05 VITALS
SYSTOLIC BLOOD PRESSURE: 124 MMHG | HEART RATE: 75 BPM | RESPIRATION RATE: 18 BRPM | DIASTOLIC BLOOD PRESSURE: 63 MMHG | HEIGHT: 71 IN | BODY MASS INDEX: 21.94 KG/M2 | WEIGHT: 156.75 LBS | TEMPERATURE: 98 F

## 2019-06-05 DIAGNOSIS — D63.0 ANEMIA IN NEOPLASTIC DISEASE: ICD-10-CM

## 2019-06-05 DIAGNOSIS — C79.51 BONE METASTASES: ICD-10-CM

## 2019-06-05 DIAGNOSIS — R29.6 FREQUENT FALLS: ICD-10-CM

## 2019-06-05 DIAGNOSIS — R26.9 GAIT DISTURBANCE: ICD-10-CM

## 2019-06-05 DIAGNOSIS — N18.30 CKD (CHRONIC KIDNEY DISEASE), STAGE III: ICD-10-CM

## 2019-06-05 DIAGNOSIS — C61 PROSTATE CANCER: ICD-10-CM

## 2019-06-05 DIAGNOSIS — R53.81 DEBILITY: ICD-10-CM

## 2019-06-05 DIAGNOSIS — C61 PROSTATE CANCER: Primary | ICD-10-CM

## 2019-06-05 LAB
ALBUMIN SERPL BCP-MCNC: 3.5 G/DL (ref 3.5–5.2)
ALP SERPL-CCNC: 57 U/L (ref 38–145)
ALT SERPL W/O P-5'-P-CCNC: 30 U/L (ref 10–44)
ANION GAP SERPL CALC-SCNC: 7 MMOL/L (ref 8–16)
AST SERPL-CCNC: 29 U/L (ref 17–59)
BASOPHILS # BLD AUTO: 0.05 K/UL (ref 0–0.2)
BASOPHILS NFR BLD: 0.9 % (ref 0–1.9)
BILIRUB SERPL-MCNC: 0.3 MG/DL (ref 0.2–1.3)
BUN SERPL-MCNC: 34 MG/DL (ref 9–21)
CALCIUM SERPL-MCNC: 9.6 MG/DL (ref 8.4–10.2)
CHLORIDE SERPL-SCNC: 104 MMOL/L (ref 95–110)
CO2 SERPL-SCNC: 27 MMOL/L (ref 22–31)
COMPLEXED PSA SERPL-MCNC: 0.9 NG/ML (ref 0–4)
CREAT SERPL-MCNC: 1.4 MG/DL (ref 0.5–1.4)
DIFFERENTIAL METHOD: ABNORMAL
EOSINOPHIL # BLD AUTO: 0.2 K/UL (ref 0–0.5)
EOSINOPHIL NFR BLD: 2.7 % (ref 0–8)
ERYTHROCYTE [DISTWIDTH] IN BLOOD BY AUTOMATED COUNT: 19.7 % (ref 11.5–14.5)
EST. GFR  (AFRICAN AMERICAN): 53 ML/MIN/1.73 M^2
EST. GFR  (NON AFRICAN AMERICAN): 46 ML/MIN/1.73 M^2
GLUCOSE SERPL-MCNC: 101 MG/DL (ref 70–110)
HCT VFR BLD AUTO: 32.5 % (ref 40–54)
HGB BLD-MCNC: 10.3 G/DL (ref 14–18)
IMM GRANULOCYTES # BLD AUTO: 0.08 K/UL (ref 0–0.04)
IMM GRANULOCYTES NFR BLD AUTO: 1.4 % (ref 0–0.5)
LDH SERPL L TO P-CCNC: 488 U/L (ref 313–618)
LYMPHOCYTES # BLD AUTO: 1.1 K/UL (ref 1–4.8)
LYMPHOCYTES NFR BLD: 18.9 % (ref 18–48)
MAGNESIUM SERPL-MCNC: 1.9 MG/DL (ref 1.6–2.6)
MCH RBC QN AUTO: 29.7 PG (ref 27–31)
MCHC RBC AUTO-ENTMCNC: 31.7 G/DL (ref 32–36)
MCV RBC AUTO: 94 FL (ref 82–98)
MONOCYTES # BLD AUTO: 0.5 K/UL (ref 0.3–1)
MONOCYTES NFR BLD: 9.4 % (ref 4–15)
NEUTROPHILS # BLD AUTO: 3.8 K/UL (ref 1.8–7.7)
NEUTROPHILS NFR BLD: 66.7 % (ref 38–73)
NRBC BLD-RTO: 0 /100 WBC
PLATELET # BLD AUTO: 147 K/UL (ref 150–350)
PMV BLD AUTO: 10.4 FL (ref 9.2–12.9)
POTASSIUM SERPL-SCNC: 4.5 MMOL/L (ref 3.5–5.1)
PROT SERPL-MCNC: 6.1 G/DL (ref 6–8.4)
RBC # BLD AUTO: 3.47 M/UL (ref 4.6–6.2)
SODIUM SERPL-SCNC: 138 MMOL/L (ref 136–145)
WBC # BLD AUTO: 5.65 K/UL (ref 3.9–12.7)

## 2019-06-05 PROCEDURE — 3074F SYST BP LT 130 MM HG: CPT | Mod: CPTII,S$GLB,, | Performed by: INTERNAL MEDICINE

## 2019-06-05 PROCEDURE — 99999 PR PBB SHADOW E&M-EST. PATIENT-LVL III: CPT | Mod: PBBFAC,,, | Performed by: INTERNAL MEDICINE

## 2019-06-05 PROCEDURE — 85025 COMPLETE CBC W/AUTO DIFF WBC: CPT

## 2019-06-05 PROCEDURE — 36415 COLL VENOUS BLD VENIPUNCTURE: CPT | Mod: PN

## 2019-06-05 PROCEDURE — 1101F PR PT FALLS ASSESS DOC 0-1 FALLS W/OUT INJ PAST YR: ICD-10-PCS | Mod: CPTII,S$GLB,, | Performed by: INTERNAL MEDICINE

## 2019-06-05 PROCEDURE — 99214 PR OFFICE/OUTPT VISIT, EST, LEVL IV, 30-39 MIN: ICD-10-PCS | Mod: S$GLB,,, | Performed by: INTERNAL MEDICINE

## 2019-06-05 PROCEDURE — 80053 COMPREHEN METABOLIC PANEL: CPT | Mod: PN

## 2019-06-05 PROCEDURE — 3078F PR MOST RECENT DIASTOLIC BLOOD PRESSURE < 80 MM HG: ICD-10-PCS | Mod: CPTII,S$GLB,, | Performed by: INTERNAL MEDICINE

## 2019-06-05 PROCEDURE — 83735 ASSAY OF MAGNESIUM: CPT | Mod: PN

## 2019-06-05 PROCEDURE — 83615 LACTATE (LD) (LDH) ENZYME: CPT

## 2019-06-05 PROCEDURE — 1101F PT FALLS ASSESS-DOCD LE1/YR: CPT | Mod: CPTII,S$GLB,, | Performed by: INTERNAL MEDICINE

## 2019-06-05 PROCEDURE — 85025 COMPLETE CBC W/AUTO DIFF WBC: CPT | Mod: PN

## 2019-06-05 PROCEDURE — 3078F DIAST BP <80 MM HG: CPT | Mod: CPTII,S$GLB,, | Performed by: INTERNAL MEDICINE

## 2019-06-05 PROCEDURE — 80053 COMPREHEN METABOLIC PANEL: CPT

## 2019-06-05 PROCEDURE — 3074F PR MOST RECENT SYSTOLIC BLOOD PRESSURE < 130 MM HG: ICD-10-PCS | Mod: CPTII,S$GLB,, | Performed by: INTERNAL MEDICINE

## 2019-06-05 PROCEDURE — 99214 OFFICE O/P EST MOD 30 MIN: CPT | Mod: S$GLB,,, | Performed by: INTERNAL MEDICINE

## 2019-06-05 PROCEDURE — 99999 PR PBB SHADOW E&M-EST. PATIENT-LVL III: ICD-10-PCS | Mod: PBBFAC,,, | Performed by: INTERNAL MEDICINE

## 2019-06-05 PROCEDURE — 84153 ASSAY OF PSA TOTAL: CPT | Mod: PN

## 2019-06-05 PROCEDURE — 83615 LACTATE (LD) (LDH) ENZYME: CPT | Mod: PN

## 2019-06-05 PROCEDURE — 84153 ASSAY OF PSA TOTAL: CPT

## 2019-06-05 PROCEDURE — 83735 ASSAY OF MAGNESIUM: CPT

## 2019-06-05 NOTE — PROGRESS NOTES
History of present illness:  The patient is an 83-year-old white gentleman well known to me for androgen independent prostate carcinoma.  Patient had excellent response to abiraterone/prednisone.  He discontinue this medication due to difficulties with muscle wasting fatigue and loss of equilibrium.  His PSA levels have remained low, and the patient has fared well clinically.  Of late, he has been having difficulty with low back pain and radiculopathy.  Patient had epidural steroid injection with relief of pain.  Unfortunately, he has continued to struggle with generalize weakness and gait disturbance.  He has suffered fall x3 and sustained injuries including multiple contusions.  During his most recent fall, he struck his right shoulder and is scheduled with Orthopedics for additional evaluation.  He remains without urinary complaints.  No other complaints pertinent findings on a 14 point review of systems.    Physical examination:  Well-developed, thin appearing, elderly, white male with a weight of 156.5 lb (decreased by 2 lb).  VITAL SIGNS: Documented  and reviewed this visit.  HEENT: Normocephalic, atraumatic. Oral mucosa pink and moist. Lips without   lesions. Tongue midline. Oropharynx clear. Nonicteric sclerae.   NECK: Supple, no adenopathy. No carotid bruits, thyromegaly or thyroid nodule.   HEART: Regular rate and rhythm without murmur, gallop or rub.   LUNGS: Clear to auscultation bilaterally. Normal respiratory effort.   ABDOMEN: Soft, nontender, nondistended with positive normoactive bowel sounds,   no hepatosplenomegaly.   EXTREMITIES: No cyanosis, clubbing or edema. Distal pulses are intact.   AXILLAE AND GROIN: No palpable pathologic lymphadenopathy is appreciated.   SKIN: Intact/turgor normal.  Scattered ecchymoses overall extremities.   NEUROLOGIC:  No focal deficits are appreciated.  However, the patient does appear generally weak and has gait disturbance.    Laboratory:  White count 5.7,  hemoglobin 10.3, hematocrit 32.5, platelets 147, absolute neutrophil count 3800.  Sodium 138, potassium 4.5, chloride 104, CO2 27, BUN 34, creatinine 1.4, glucose 101, calcium 9.6, magnesium 1.9, liver function test within normal limits, , GFR is 46.  PSA has risen slightly from 0.58 to 0.90.  Iron studies from April reveal elevated TIBC, decreased iron saturation, but normal soluble transferrin receptor and are most consistent with chronic disease anemia.    Impression:  1.  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  2.  Normocytic anemia-stable.  3.  Stage 3 chronic kidney disease.  4.  General debility.  5.  Frequent falls/gait disturbance.    Plan:  1.  Remain off abiraterone and prednisone at this time.  2.  Continue calcium supplementation with vitamin-D.  3.  Continue quarterly  Xgeva 120 mg subcu  4.  Patient is to keep appointment with Orthopedics regarding injury to his right shoulder.  5.  I have offered to repeat bone scanning and CT abdomen and pelvis for restaging the patient's prostate carcinoma.  He wishes to delay this imaging until his next scheduled appointment for quarterly Xgeva administration.  6.  If patient remains anemic, could consider a trial of intravenous iron replacement, especially if the patient's hemoglobin falls below 10 where he might qualify for use of TRISTEN.    This note was created using voice recognition software and may contain grammatical errors.

## 2019-06-21 DIAGNOSIS — D49.59 NEOPLASM OF PROSTATE: ICD-10-CM

## 2019-07-02 ENCOUNTER — HOSPITAL ENCOUNTER (OUTPATIENT)
Dept: RADIOLOGY | Facility: HOSPITAL | Age: 84
Discharge: HOME | End: 2019-07-02
Attending: INTERNAL MEDICINE
Payer: MEDICARE

## 2019-07-02 DIAGNOSIS — D49.59 NEOPLASM OF PROSTATE: ICD-10-CM

## 2019-07-02 PROCEDURE — A9503 TC99M MEDRONATE: HCPCS | Mod: PO

## 2019-07-02 PROCEDURE — 78306 BONE IMAGING WHOLE BODY: CPT | Mod: 26,,, | Performed by: RADIOLOGY

## 2019-07-02 PROCEDURE — 74177 CT ABDOMEN PELVIS WITH CONTRAST: ICD-10-PCS | Mod: 26,,, | Performed by: RADIOLOGY

## 2019-07-02 PROCEDURE — 25500020 PHARM REV CODE 255: Mod: PO | Performed by: INTERNAL MEDICINE

## 2019-07-02 PROCEDURE — 74177 CT ABD & PELVIS W/CONTRAST: CPT | Mod: TC,PO

## 2019-07-02 PROCEDURE — 74177 CT ABD & PELVIS W/CONTRAST: CPT | Mod: 26,,, | Performed by: RADIOLOGY

## 2019-07-02 PROCEDURE — 78306 NM BONE SCAN WHOLE BODY: ICD-10-PCS | Mod: 26,,, | Performed by: RADIOLOGY

## 2019-07-02 RX ADMIN — IOHEXOL 75 ML: 350 INJECTION, SOLUTION INTRAVENOUS at 10:07

## 2019-07-02 RX ADMIN — IOHEXOL 30 ML: 350 INJECTION, SOLUTION INTRAVENOUS at 10:07

## 2019-07-09 ENCOUNTER — OFFICE VISIT (OUTPATIENT)
Dept: HEMATOLOGY/ONCOLOGY | Facility: CLINIC | Age: 84
End: 2019-07-09
Payer: MEDICARE

## 2019-07-09 VITALS
RESPIRATION RATE: 22 BRPM | SYSTOLIC BLOOD PRESSURE: 88 MMHG | TEMPERATURE: 97 F | DIASTOLIC BLOOD PRESSURE: 59 MMHG | WEIGHT: 149.5 LBS | BODY MASS INDEX: 20.93 KG/M2 | OXYGEN SATURATION: 92 % | HEART RATE: 63 BPM | HEIGHT: 71 IN

## 2019-07-09 DIAGNOSIS — D63.0 ANEMIA IN NEOPLASTIC DISEASE: ICD-10-CM

## 2019-07-09 DIAGNOSIS — N18.30 CKD (CHRONIC KIDNEY DISEASE), STAGE III: ICD-10-CM

## 2019-07-09 DIAGNOSIS — C79.51 BONE METASTASES: ICD-10-CM

## 2019-07-09 DIAGNOSIS — D49.59 NEOPLASM OF PROSTATE: Primary | ICD-10-CM

## 2019-07-09 PROCEDURE — 99999 PR PBB SHADOW E&M-EST. PATIENT-LVL V: ICD-10-PCS | Mod: PBBFAC,,, | Performed by: INTERNAL MEDICINE

## 2019-07-09 PROCEDURE — 3078F PR MOST RECENT DIASTOLIC BLOOD PRESSURE < 80 MM HG: ICD-10-PCS | Mod: CPTII,S$GLB,, | Performed by: INTERNAL MEDICINE

## 2019-07-09 PROCEDURE — 99999 PR PBB SHADOW E&M-EST. PATIENT-LVL V: CPT | Mod: PBBFAC,,, | Performed by: INTERNAL MEDICINE

## 2019-07-09 PROCEDURE — 3078F DIAST BP <80 MM HG: CPT | Mod: CPTII,S$GLB,, | Performed by: INTERNAL MEDICINE

## 2019-07-09 PROCEDURE — 1101F PR PT FALLS ASSESS DOC 0-1 FALLS W/OUT INJ PAST YR: ICD-10-PCS | Mod: CPTII,S$GLB,, | Performed by: INTERNAL MEDICINE

## 2019-07-09 PROCEDURE — 3074F SYST BP LT 130 MM HG: CPT | Mod: CPTII,S$GLB,, | Performed by: INTERNAL MEDICINE

## 2019-07-09 PROCEDURE — 99214 OFFICE O/P EST MOD 30 MIN: CPT | Mod: S$GLB,,, | Performed by: INTERNAL MEDICINE

## 2019-07-09 PROCEDURE — 99214 PR OFFICE/OUTPT VISIT, EST, LEVL IV, 30-39 MIN: ICD-10-PCS | Mod: S$GLB,,, | Performed by: INTERNAL MEDICINE

## 2019-07-09 PROCEDURE — 1101F PT FALLS ASSESS-DOCD LE1/YR: CPT | Mod: CPTII,S$GLB,, | Performed by: INTERNAL MEDICINE

## 2019-07-09 PROCEDURE — 3074F PR MOST RECENT SYSTOLIC BLOOD PRESSURE < 130 MM HG: ICD-10-PCS | Mod: CPTII,S$GLB,, | Performed by: INTERNAL MEDICINE

## 2019-07-09 NOTE — PROGRESS NOTES
History of present illness:  The patient is an 83-year-old white gentleman well known to me for androgen independent prostate carcinoma.  Patient had excellent response to abiraterone/prednisone.  He discontinue this medication due to difficulties with muscle wasting fatigue and loss of equilibrium.  His PSA levels have remained low, and the patient has fared well clinically.    Unfortunately, he has continued to struggle with generalize weakness and gait disturbance.  Patient continues to suffer right shoulder pain after a recent fall.  This has been evaluated by Orthopedics who recommended only conservative management.  Patient has weaned himself from both gabapentin and Flomax since last office evaluation.  He remains without difficulties with urination.  No other complaints pertinent findings on a 14 point review of systems.    Physical examination:  Well-developed, thin appearing, elderly, white male with a weight of 149.5 lb (decreased by 7 lb).  VITAL SIGNS: Documented  and reviewed this visit.  HEENT: Normocephalic, atraumatic. Oral mucosa pink and moist. Lips without   lesions. Tongue midline. Oropharynx clear. Nonicteric sclerae.   NECK: Supple, no adenopathy. No carotid bruits, thyromegaly or thyroid nodule.   HEART: Regular rate and rhythm without murmur, gallop or rub.   LUNGS: Clear to auscultation bilaterally. Normal respiratory effort.   ABDOMEN: Soft, nontender, nondistended with positive normoactive bowel sounds,   no hepatosplenomegaly.   EXTREMITIES: No cyanosis, clubbing or edema. Distal pulses are intact.   AXILLAE AND GROIN: No palpable pathologic lymphadenopathy is appreciated.   SKIN: Intact/turgor normal.  Scattered ecchymoses overall extremities.   NEUROLOGIC:  No focal deficits are appreciated.  However, the patient does appear generally weak and has gait disturbance.    Laboratory:  White count 6.6, hemoglobin 10.8, hematocrit 32, platelets 131, absolute neutrophil count is 5100.  Serum  iron 73, TIBC 429, saturated iron 17%, ferritin 390, soluble transferrin receptor 2.5.  Sodium 133, potassium 4.6, chloride 102, CO2 22, BUN 29, creatinine 1.2, glucose 90, calcium 9.3, magnesium 1.9, liver function test within acceptable limits, , GFR 56.  PSA 0.85.    Total body bone scan:  Stable osseous metastatic disease.    CT abdomen pelvis:  Stable osseous metastatic disease.  Patient remains without evidence of gail or visceral metastasis.    Impression:  1.  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  2.  Normocytic anemia-stable.  3.  Stage 3 chronic kidney disease.  4.  General debility.  5.  Frequent falls/gait disturbance.    Plan:  1.  Remain off abiraterone and prednisone at this time.  2.  Continue calcium supplementation with vitamin-D.  3.  Repeat quarterly  Xgeva 120 mg subcu  4.  No intervention for anemia at this time.  5.  Return to clinic 3 months from now with interval CBC, CMP, LDH, magnesium, and PSA prior to appointment.    This note was created using voice recognition software and may contain grammatical errors.

## 2019-07-12 ENCOUNTER — INFUSION (OUTPATIENT)
Dept: INFUSION THERAPY | Facility: HOSPITAL | Age: 84
End: 2019-07-12
Attending: INTERNAL MEDICINE
Payer: MEDICARE

## 2019-07-12 VITALS
SYSTOLIC BLOOD PRESSURE: 100 MMHG | TEMPERATURE: 98 F | DIASTOLIC BLOOD PRESSURE: 52 MMHG | HEART RATE: 81 BPM | HEIGHT: 71 IN | RESPIRATION RATE: 20 BRPM | BODY MASS INDEX: 20.93 KG/M2 | WEIGHT: 149.5 LBS

## 2019-07-12 DIAGNOSIS — C79.51 BONE METASTASES: ICD-10-CM

## 2019-07-12 DIAGNOSIS — E86.0 DEHYDRATION: Primary | ICD-10-CM

## 2019-07-12 DIAGNOSIS — C61 PROSTATE CANCER: ICD-10-CM

## 2019-07-12 DIAGNOSIS — R19.7 DIARRHEA, UNSPECIFIED TYPE: ICD-10-CM

## 2019-07-12 PROCEDURE — 63600175 PHARM REV CODE 636 W HCPCS: Mod: JG,PN | Performed by: INTERNAL MEDICINE

## 2019-07-12 PROCEDURE — 96372 THER/PROPH/DIAG INJ SC/IM: CPT | Mod: PN

## 2019-07-12 RX ADMIN — DENOSUMAB 120 MG: 120 INJECTION SUBCUTANEOUS at 12:07

## 2019-07-12 NOTE — PLAN OF CARE
Problem: Adult Inpatient Plan of Care  Goal: Plan of Care Review  Pt tolerated tx well. No signs of infusion reaction noted. AVS given to pt Pt educated to call Dr with any issues. Pt verbalized understanding. Pt adequate for discharge.

## 2019-07-12 NOTE — PLAN OF CARE
Problem: Fatigue  Goal: Improved Activity Tolerance    Intervention: Promote Energy Conservation  Pt educated to continue calcium and vit D supplement. Pt also educated to refrain from invasive oral procedures for 3 months. Pt verbalized understanding

## 2019-07-19 ENCOUNTER — TELEPHONE (OUTPATIENT)
Dept: UROLOGY | Facility: CLINIC | Age: 84
End: 2019-07-19

## 2019-07-19 NOTE — TELEPHONE ENCOUNTER
Advised pt Dr. Delaney does his procedures on Monday or Friday afternoon. Pt would like to get a little better before scheduling procedure.

## 2019-07-19 NOTE — TELEPHONE ENCOUNTER
----- Message from Mi Medina sent at 7/19/2019 11:33 AM CDT -----  Contact: Cameron grimaldo  Type: Needs Medical Advice    Who Called:  Cameron Mack Call Back Number: [phone]  Additional Information: Pls call pt regarding a procedure on his bladder that was previously discussed.

## 2019-08-19 ENCOUNTER — TELEPHONE (OUTPATIENT)
Dept: DERMATOLOGY | Facility: CLINIC | Age: 84
End: 2019-08-19

## 2019-08-19 NOTE — TELEPHONE ENCOUNTER
Advised spouse that  has no opening on Monday August 19.   ----- Message from Dank Kenney sent at 8/16/2019  1:56 PM CDT -----  Contact: patient  Type: Needs Medical Advice    Who Called:  patient  Symptoms (please be specific):  Pt's spouse has another appt on 8-22 want to know if there is any way to change her aappr form 8-23 to 8-22   Best Call Back Number: [phone]  Additional Information: please call if can change appt to Monday around 930 or 10

## 2019-10-10 ENCOUNTER — TELEPHONE (OUTPATIENT)
Dept: HEMATOLOGY/ONCOLOGY | Facility: CLINIC | Age: 84
End: 2019-10-10

## 2019-10-10 NOTE — TELEPHONE ENCOUNTER
----- Message from Ivanna Horn sent at 10/10/2019  2:32 PM CDT -----  Type: Needs to reschedule missed appointments    Who Called:  Patient  Best Call Back Number: [phone]  Additional Information: Please call patient back to reschedule missed appointments on October 9th/please call patient back to reschedule.

## 2019-10-11 ENCOUNTER — TELEPHONE (OUTPATIENT)
Dept: HEMATOLOGY/ONCOLOGY | Facility: CLINIC | Age: 84
End: 2019-10-11

## 2019-10-11 NOTE — TELEPHONE ENCOUNTER
----- Message from Parker Figueroa sent at 10/11/2019  7:52 AM CDT -----  Contact: pt  Type: Needs Medical Advice    Who Called:  pt    Best Call Back Number: [phone]  Additional Information: pt states he is to have an injection this month but has not been notified of an appt and none are made. Please call to advise

## 2019-10-21 ENCOUNTER — TELEPHONE (OUTPATIENT)
Dept: HEMATOLOGY/ONCOLOGY | Facility: CLINIC | Age: 84
End: 2019-10-21

## 2019-10-21 NOTE — TELEPHONE ENCOUNTER
----- Message from Panchito Frankel sent at 10/21/2019  8:21 AM CDT -----  Contact: Patient  Type: Needs Medical Advice    Who Called:  Patient  Best Call Back Number: [phone]  Additional Information: Patient would like to reschedule missed injection and appointment with Dr. Fernandez to 10/29 around the same time as patient's wife (Joan Bridges MRN [id number]). Please call to advise. Thanks!

## 2019-10-29 ENCOUNTER — INFUSION (OUTPATIENT)
Dept: INFUSION THERAPY | Facility: HOSPITAL | Age: 84
End: 2019-10-29
Attending: INTERNAL MEDICINE
Payer: MEDICARE

## 2019-10-29 ENCOUNTER — OFFICE VISIT (OUTPATIENT)
Dept: HEMATOLOGY/ONCOLOGY | Facility: CLINIC | Age: 84
End: 2019-10-29
Payer: MEDICARE

## 2019-10-29 VITALS
RESPIRATION RATE: 20 BRPM | SYSTOLIC BLOOD PRESSURE: 93 MMHG | WEIGHT: 155.44 LBS | BODY MASS INDEX: 21.76 KG/M2 | OXYGEN SATURATION: 98 % | DIASTOLIC BLOOD PRESSURE: 51 MMHG | HEIGHT: 71 IN | HEART RATE: 78 BPM | TEMPERATURE: 98 F

## 2019-10-29 VITALS
WEIGHT: 155.44 LBS | TEMPERATURE: 98 F | BODY MASS INDEX: 21.76 KG/M2 | HEIGHT: 71 IN | RESPIRATION RATE: 20 BRPM | HEART RATE: 78 BPM | SYSTOLIC BLOOD PRESSURE: 93 MMHG | OXYGEN SATURATION: 98 % | DIASTOLIC BLOOD PRESSURE: 51 MMHG

## 2019-10-29 DIAGNOSIS — C61 PROSTATE CANCER: ICD-10-CM

## 2019-10-29 DIAGNOSIS — C79.51 BONE METASTASES: ICD-10-CM

## 2019-10-29 DIAGNOSIS — R19.7 DIARRHEA, UNSPECIFIED TYPE: ICD-10-CM

## 2019-10-29 DIAGNOSIS — R53.81 DEBILITY: ICD-10-CM

## 2019-10-29 DIAGNOSIS — D49.59 NEOPLASM OF PROSTATE: Primary | ICD-10-CM

## 2019-10-29 DIAGNOSIS — E86.0 DEHYDRATION: Primary | ICD-10-CM

## 2019-10-29 DIAGNOSIS — D63.0 ANEMIA IN NEOPLASTIC DISEASE: ICD-10-CM

## 2019-10-29 DIAGNOSIS — N18.30 CKD (CHRONIC KIDNEY DISEASE), STAGE III: ICD-10-CM

## 2019-10-29 DIAGNOSIS — R26.9 GAIT DISTURBANCE: ICD-10-CM

## 2019-10-29 PROCEDURE — 1101F PT FALLS ASSESS-DOCD LE1/YR: CPT | Mod: CPTII,S$GLB,, | Performed by: INTERNAL MEDICINE

## 2019-10-29 PROCEDURE — 1101F PR PT FALLS ASSESS DOC 0-1 FALLS W/OUT INJ PAST YR: ICD-10-PCS | Mod: CPTII,S$GLB,, | Performed by: INTERNAL MEDICINE

## 2019-10-29 PROCEDURE — 3078F DIAST BP <80 MM HG: CPT | Mod: CPTII,S$GLB,, | Performed by: INTERNAL MEDICINE

## 2019-10-29 PROCEDURE — 99215 PR OFFICE/OUTPT VISIT, EST, LEVL V, 40-54 MIN: ICD-10-PCS | Mod: S$GLB,,, | Performed by: INTERNAL MEDICINE

## 2019-10-29 PROCEDURE — 3074F PR MOST RECENT SYSTOLIC BLOOD PRESSURE < 130 MM HG: ICD-10-PCS | Mod: CPTII,S$GLB,, | Performed by: INTERNAL MEDICINE

## 2019-10-29 PROCEDURE — 3074F SYST BP LT 130 MM HG: CPT | Mod: CPTII,S$GLB,, | Performed by: INTERNAL MEDICINE

## 2019-10-29 PROCEDURE — 3078F PR MOST RECENT DIASTOLIC BLOOD PRESSURE < 80 MM HG: ICD-10-PCS | Mod: CPTII,S$GLB,, | Performed by: INTERNAL MEDICINE

## 2019-10-29 PROCEDURE — 99999 PR PBB SHADOW E&M-EST. PATIENT-LVL III: CPT | Mod: PBBFAC,,, | Performed by: INTERNAL MEDICINE

## 2019-10-29 PROCEDURE — 63600175 PHARM REV CODE 636 W HCPCS: Mod: JG,PN | Performed by: INTERNAL MEDICINE

## 2019-10-29 PROCEDURE — 96372 THER/PROPH/DIAG INJ SC/IM: CPT | Mod: PN

## 2019-10-29 PROCEDURE — 99215 OFFICE O/P EST HI 40 MIN: CPT | Mod: S$GLB,,, | Performed by: INTERNAL MEDICINE

## 2019-10-29 PROCEDURE — 99999 PR PBB SHADOW E&M-EST. PATIENT-LVL III: ICD-10-PCS | Mod: PBBFAC,,, | Performed by: INTERNAL MEDICINE

## 2019-10-29 RX ORDER — DIPHENHYDRAMINE HCL 25 MG
25 CAPSULE ORAL ONCE
Status: CANCELLED | OUTPATIENT
Start: 2019-10-29

## 2019-10-29 RX ORDER — HYDROCODONE BITARTRATE AND ACETAMINOPHEN 500; 5 MG/1; MG/1
TABLET ORAL ONCE
Status: CANCELLED | OUTPATIENT
Start: 2019-10-29 | End: 2019-10-29

## 2019-10-29 RX ORDER — FUROSEMIDE 10 MG/ML
20 INJECTION INTRAMUSCULAR; INTRAVENOUS 2 TIMES DAILY
Status: CANCELLED | OUTPATIENT
Start: 2019-10-29 | End: 2019-10-30

## 2019-10-29 RX ORDER — ACETAMINOPHEN 325 MG/1
650 TABLET ORAL ONCE
Status: CANCELLED | OUTPATIENT
Start: 2019-10-29

## 2019-10-29 RX ADMIN — DENOSUMAB 120 MG: 120 INJECTION SUBCUTANEOUS at 03:10
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
## 2019-11-01 ENCOUNTER — TELEPHONE (OUTPATIENT)
Dept: CARDIOLOGY | Facility: CLINIC | Age: 84
End: 2019-11-01

## 2019-11-01 ENCOUNTER — DOCUMENTATION ONLY (OUTPATIENT)
Dept: INFUSION THERAPY | Facility: HOSPITAL | Age: 84
End: 2019-11-01

## 2019-11-01 PROBLEM — R06.02 SOB (SHORTNESS OF BREATH): Status: ACTIVE | Noted: 2019-11-01

## 2019-11-01 PROBLEM — J96.01 ACUTE HYPOXEMIC RESPIRATORY FAILURE: Status: ACTIVE | Noted: 2019-11-01

## 2019-11-01 PROBLEM — I21.4 NSTEMI (NON-ST ELEVATED MYOCARDIAL INFARCTION): Status: ACTIVE | Noted: 2019-11-01

## 2019-11-01 PROBLEM — N17.9 AKI (ACUTE KIDNEY INJURY): Status: ACTIVE | Noted: 2019-11-01

## 2019-11-01 PROBLEM — R74.01 TRANSAMINITIS: Status: ACTIVE | Noted: 2019-11-01

## 2019-11-01 RX ORDER — DIPHENHYDRAMINE HYDROCHLORIDE 50 MG/ML
25 INJECTION INTRAMUSCULAR; INTRAVENOUS ONCE
Status: CANCELLED
Start: 2019-11-08

## 2019-11-01 RX ORDER — FAMOTIDINE 10 MG/ML
20 INJECTION INTRAVENOUS DAILY
Status: CANCELLED
Start: 2019-11-08

## 2019-11-01 RX ORDER — HEPARIN 100 UNIT/ML
500 SYRINGE INTRAVENOUS
Status: CANCELLED | OUTPATIENT
Start: 2019-11-08

## 2019-11-01 RX ORDER — SODIUM CHLORIDE 0.9 % (FLUSH) 0.9 %
10 SYRINGE (ML) INJECTION
Status: CANCELLED | OUTPATIENT
Start: 2019-11-08

## 2019-11-01 NOTE — PROGRESS NOTES
[11/1/2019 11:09 AM]  Charlette Gupta:    I need to get some iron dates for [id number] Bridges   please      [11/1/2019 11:21 AM]    n [id number] artie 11/08/19 @1:30 and 11/11/19 @1:30

## 2019-11-01 NOTE — TELEPHONE ENCOUNTER
Er doc would like to speak with you regarding pt. ----- Message from Taya Bynum sent at 11/1/2019 11:47 AM CDT -----  Contact: [facility] nurse/ER  Type: Needs Medical Advice    Who Called:  Er nurse/ Mary  Symptoms (please be specific):  na  How long has patient had these symptoms:  na  Pharmacy name and phone #:  na  Best Call Back Number:[phone]   Additional Information: Nurse  States Dr. Hernandez Would like to speak with Lecce regarding patient in the hospital and stated patient is having a heart attack.Sent message to the pod. Thanks!

## 2019-11-02 VITALS
OXYGEN SATURATION: 100 % | DIASTOLIC BLOOD PRESSURE: 63 MMHG | SYSTOLIC BLOOD PRESSURE: 114 MMHG | TEMPERATURE: 98 F | HEART RATE: 84 BPM | RESPIRATION RATE: 24 BRPM

## 2019-11-02 PROBLEM — R47.1 DYSARTHRIA: Status: ACTIVE | Noted: 2019-11-02

## 2019-11-03 VITALS
RESPIRATION RATE: 20 BRPM | SYSTOLIC BLOOD PRESSURE: 111 MMHG | DIASTOLIC BLOOD PRESSURE: 68 MMHG | DIASTOLIC BLOOD PRESSURE: 63 MMHG | TEMPERATURE: 98 F | OXYGEN SATURATION: 100 % | SYSTOLIC BLOOD PRESSURE: 114 MMHG | HEART RATE: 86 BPM | OXYGEN SATURATION: 100 % | TEMPERATURE: 98 F | RESPIRATION RATE: 18 BRPM | HEART RATE: 84 BPM

## 2019-11-03 VITALS
TEMPERATURE: 98 F | OXYGEN SATURATION: 100 % | DIASTOLIC BLOOD PRESSURE: 75 MMHG | HEART RATE: 82 BPM | SYSTOLIC BLOOD PRESSURE: 129 MMHG | RESPIRATION RATE: 16 BRPM

## 2019-11-03 VITALS
TEMPERATURE: 98 F | SYSTOLIC BLOOD PRESSURE: 112 MMHG | OXYGEN SATURATION: 100 % | DIASTOLIC BLOOD PRESSURE: 74 MMHG | HEART RATE: 91 BPM

## 2019-11-04 VITALS
TEMPERATURE: 98 F | HEART RATE: 75 BPM | RESPIRATION RATE: 19 BRPM | OXYGEN SATURATION: 100 % | SYSTOLIC BLOOD PRESSURE: 122 MMHG | DIASTOLIC BLOOD PRESSURE: 68 MMHG

## 2019-11-04 VITALS
HEART RATE: 113 BPM | SYSTOLIC BLOOD PRESSURE: 120 MMHG | TEMPERATURE: 98 F | OXYGEN SATURATION: 100 % | RESPIRATION RATE: 18 BRPM | DIASTOLIC BLOOD PRESSURE: 56 MMHG

## 2019-11-04 VITALS
OXYGEN SATURATION: 99 % | OXYGEN SATURATION: 100 % | DIASTOLIC BLOOD PRESSURE: 61 MMHG | DIASTOLIC BLOOD PRESSURE: 66 MMHG | HEART RATE: 99 BPM | HEART RATE: 87 BPM | TEMPERATURE: 98 F | SYSTOLIC BLOOD PRESSURE: 129 MMHG | TEMPERATURE: 98 F | RESPIRATION RATE: 20 BRPM | SYSTOLIC BLOOD PRESSURE: 114 MMHG | RESPIRATION RATE: 18 BRPM

## 2019-11-05 VITALS
SYSTOLIC BLOOD PRESSURE: 130 MMHG | RESPIRATION RATE: 20 BRPM | OXYGEN SATURATION: 85 % | DIASTOLIC BLOOD PRESSURE: 53 MMHG | HEART RATE: 106 BPM | TEMPERATURE: 98 F

## 2019-11-05 VITALS
OXYGEN SATURATION: 100 % | DIASTOLIC BLOOD PRESSURE: 66 MMHG | TEMPERATURE: 98 F | HEART RATE: 92 BPM | RESPIRATION RATE: 18 BRPM | SYSTOLIC BLOOD PRESSURE: 116 MMHG

## 2019-11-05 VITALS
DIASTOLIC BLOOD PRESSURE: 82 MMHG | SYSTOLIC BLOOD PRESSURE: 143 MMHG | RESPIRATION RATE: 16 BRPM | OXYGEN SATURATION: 100 % | HEART RATE: 125 BPM | TEMPERATURE: 98 F

## 2019-11-05 PROBLEM — Z75.8 DISCHARGE PLANNING ISSUES: Status: ACTIVE | Noted: 2019-06-05

## 2019-11-06 PROBLEM — Z51.5 COMFORT MEASURES ONLY STATUS: Status: ACTIVE | Noted: 2019-11-06

## 2019-11-08 PROBLEM — J96.00 ACUTE RESPIRATORY FAILURE: Status: ACTIVE | Noted: 2019-11-08

## 2021-05-24 NOTE — TELEPHONE ENCOUNTER
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 In a log of pain, walking only a little bit with use of a walker. Tells me he can not make appt for labs this week. Follow up from surgery in 3 weeks. Will review with Fransico and rtc to pt. Paul   
Controlled with current regime

## (undated) DEVICE — NDL SPINAL SPINOCAN 22GX3.5

## (undated) DEVICE — NDL HYPODERMIC BLUNT 18G 1.5IN

## (undated) DEVICE — GLOVE PROTEXIS PI CLASSIC 7.5

## (undated) DEVICE — NDL SAFETY 25G X 1.5 ECLIPSE

## (undated) DEVICE — SYR DISP LL 5CC

## (undated) DEVICE — SYS LABEL CORRECT MED

## (undated) DEVICE — APPLICATOR CHLORAPREP CLR 10.5

## (undated) DEVICE — TUBING MINIBORE EXTENSION

## (undated) DEVICE — SYR 10CC LUER LOCK